# Patient Record
Sex: FEMALE | Race: BLACK OR AFRICAN AMERICAN | NOT HISPANIC OR LATINO | Employment: UNEMPLOYED | RURAL
[De-identification: names, ages, dates, MRNs, and addresses within clinical notes are randomized per-mention and may not be internally consistent; named-entity substitution may affect disease eponyms.]

---

## 2020-10-21 ENCOUNTER — HISTORICAL (OUTPATIENT)
Dept: ADMINISTRATIVE | Facility: HOSPITAL | Age: 65
End: 2020-10-21

## 2021-03-01 LAB
CHOLEST SERPL-MSCNC: 186 MG/DL (ref 0–200)
HDLC SERPL-MCNC: 65 MG/DL
LDLC SERPL CALC-MCNC: 99 MG/DL
TRIGL SERPL-MCNC: 112 MG/DL

## 2021-03-14 DIAGNOSIS — Z12.31 SCREENING MAMMOGRAM FOR HIGH-RISK PATIENT: Primary | ICD-10-CM

## 2021-06-14 RX ORDER — MELOXICAM 7.5 MG/1
7.5-15 TABLET ORAL DAILY
Qty: 30 TABLET | Refills: 1 | Status: SHIPPED | OUTPATIENT
Start: 2021-06-14 | End: 2021-08-16 | Stop reason: SDUPTHER

## 2021-06-14 RX ORDER — CITALOPRAM 20 MG/1
20 TABLET, FILM COATED ORAL DAILY
COMMUNITY
Start: 2021-04-05 | End: 2021-06-14 | Stop reason: SDUPTHER

## 2021-06-14 RX ORDER — FUROSEMIDE 40 MG/1
1 TABLET ORAL DAILY
COMMUNITY
Start: 2021-04-26 | End: 2021-06-14 | Stop reason: SDUPTHER

## 2021-06-14 RX ORDER — GLIPIZIDE 5 MG/1
5 TABLET ORAL
Qty: 30 TABLET | Refills: 1 | Status: SHIPPED | OUTPATIENT
Start: 2021-06-14 | End: 2021-08-16 | Stop reason: SDUPTHER

## 2021-06-14 RX ORDER — HYDROCHLOROTHIAZIDE 12.5 MG/1
12.5-25 TABLET ORAL DAILY
Qty: 30 TABLET | Refills: 1 | Status: SHIPPED | OUTPATIENT
Start: 2021-06-14 | End: 2021-08-16 | Stop reason: SDUPTHER

## 2021-06-14 RX ORDER — SITAGLIPTIN AND METFORMIN HYDROCHLORIDE 1000; 100 MG/1; MG/1
1 TABLET, FILM COATED, EXTENDED RELEASE ORAL DAILY
Qty: 30 TABLET | Refills: 1 | Status: SHIPPED | OUTPATIENT
Start: 2021-06-14 | End: 2021-08-16 | Stop reason: SDUPTHER

## 2021-06-14 RX ORDER — CETIRIZINE HYDROCHLORIDE 10 MG/1
1 TABLET ORAL DAILY
COMMUNITY
Start: 2021-05-31 | End: 2021-06-14 | Stop reason: SDUPTHER

## 2021-06-14 RX ORDER — HYDROCHLOROTHIAZIDE 12.5 MG/1
1-2 TABLET ORAL DAILY
COMMUNITY
Start: 2020-12-28 | End: 2021-06-14 | Stop reason: SDUPTHER

## 2021-06-14 RX ORDER — MELOXICAM 7.5 MG/1
1-2 TABLET ORAL DAILY
COMMUNITY
Start: 2021-01-09 | End: 2021-06-14 | Stop reason: SDUPTHER

## 2021-06-14 RX ORDER — GLIPIZIDE 5 MG/1
1 TABLET ORAL
COMMUNITY
Start: 2021-03-16 | End: 2021-06-14 | Stop reason: SDUPTHER

## 2021-06-14 RX ORDER — SIMVASTATIN 40 MG/1
40 TABLET, FILM COATED ORAL NIGHTLY
Qty: 30 TABLET | Refills: 1 | Status: SHIPPED | OUTPATIENT
Start: 2021-06-14 | End: 2021-08-16 | Stop reason: SDUPTHER

## 2021-06-14 RX ORDER — CITALOPRAM 20 MG/1
20 TABLET, FILM COATED ORAL DAILY
Qty: 30 TABLET | Refills: 1 | Status: SHIPPED | OUTPATIENT
Start: 2021-06-14 | End: 2021-08-16 | Stop reason: SDUPTHER

## 2021-06-14 RX ORDER — SIMVASTATIN 40 MG/1
1 TABLET, FILM COATED ORAL NIGHTLY
COMMUNITY
Start: 2021-02-12 | End: 2021-06-14 | Stop reason: SDUPTHER

## 2021-06-14 RX ORDER — LISINOPRIL 10 MG/1
10 TABLET ORAL DAILY
Qty: 30 TABLET | Refills: 1 | Status: SHIPPED | OUTPATIENT
Start: 2021-06-14 | End: 2021-06-17

## 2021-06-14 RX ORDER — SITAGLIPTIN AND METFORMIN HYDROCHLORIDE 1000; 100 MG/1; MG/1
1 TABLET, FILM COATED, EXTENDED RELEASE ORAL DAILY
COMMUNITY
Start: 2021-05-12 | End: 2021-06-14 | Stop reason: SDUPTHER

## 2021-06-14 RX ORDER — CETIRIZINE HYDROCHLORIDE 10 MG/1
10 TABLET ORAL DAILY
Qty: 30 TABLET | Refills: 1 | Status: SHIPPED | OUTPATIENT
Start: 2021-06-14 | End: 2021-08-16 | Stop reason: SDUPTHER

## 2021-06-14 RX ORDER — FUROSEMIDE 40 MG/1
40 TABLET ORAL DAILY
Qty: 30 TABLET | Refills: 1 | Status: SHIPPED | OUTPATIENT
Start: 2021-06-14 | End: 2021-08-16 | Stop reason: SDUPTHER

## 2021-06-14 RX ORDER — LISINOPRIL 10 MG/1
1 TABLET ORAL DAILY
COMMUNITY
Start: 2021-05-31 | End: 2021-06-14 | Stop reason: SDUPTHER

## 2021-06-17 ENCOUNTER — OFFICE VISIT (OUTPATIENT)
Dept: FAMILY MEDICINE | Facility: CLINIC | Age: 66
End: 2021-06-17
Payer: COMMERCIAL

## 2021-06-17 VITALS
HEIGHT: 70 IN | TEMPERATURE: 98 F | BODY MASS INDEX: 41.95 KG/M2 | HEART RATE: 88 BPM | WEIGHT: 293 LBS | OXYGEN SATURATION: 97 % | DIASTOLIC BLOOD PRESSURE: 70 MMHG | RESPIRATION RATE: 18 BRPM | SYSTOLIC BLOOD PRESSURE: 160 MMHG

## 2021-06-17 DIAGNOSIS — E11.9 TYPE 2 DIABETES MELLITUS WITHOUT COMPLICATION, WITHOUT LONG-TERM CURRENT USE OF INSULIN: ICD-10-CM

## 2021-06-17 DIAGNOSIS — I10 ESSENTIAL HYPERTENSION: ICD-10-CM

## 2021-06-17 PROBLEM — J30.1 SEASONAL ALLERGIC RHINITIS DUE TO POLLEN: Status: ACTIVE | Noted: 2021-06-17

## 2021-06-17 PROCEDURE — 99213 OFFICE O/P EST LOW 20 MIN: CPT | Mod: ,,, | Performed by: FAMILY MEDICINE

## 2021-06-17 PROCEDURE — 99213 PR OFFICE/OUTPT VISIT, EST, LEVL III, 20-29 MIN: ICD-10-PCS | Mod: ,,, | Performed by: FAMILY MEDICINE

## 2021-06-17 RX ORDER — LISINOPRIL 20 MG/1
20 TABLET ORAL DAILY
Qty: 90 TABLET | Refills: 3 | Status: SHIPPED | OUTPATIENT
Start: 2021-06-17 | End: 2021-07-27

## 2021-06-17 RX ORDER — GLIMEPIRIDE 4 MG/1
2 TABLET ORAL DAILY
COMMUNITY
Start: 2021-03-04 | End: 2021-06-17

## 2021-07-13 ENCOUNTER — OFFICE VISIT (OUTPATIENT)
Dept: FAMILY MEDICINE | Facility: CLINIC | Age: 66
End: 2021-07-13
Payer: COMMERCIAL

## 2021-07-13 VITALS
DIASTOLIC BLOOD PRESSURE: 78 MMHG | HEIGHT: 70 IN | BODY MASS INDEX: 41.95 KG/M2 | SYSTOLIC BLOOD PRESSURE: 148 MMHG | WEIGHT: 293 LBS | RESPIRATION RATE: 18 BRPM | OXYGEN SATURATION: 95 % | HEART RATE: 96 BPM

## 2021-07-13 DIAGNOSIS — I10 ESSENTIAL HYPERTENSION: Primary | ICD-10-CM

## 2021-07-13 PROCEDURE — 99213 PR OFFICE/OUTPT VISIT, EST, LEVL III, 20-29 MIN: ICD-10-PCS | Mod: ,,, | Performed by: FAMILY MEDICINE

## 2021-07-13 PROCEDURE — 99213 OFFICE O/P EST LOW 20 MIN: CPT | Mod: ,,, | Performed by: FAMILY MEDICINE

## 2021-07-27 ENCOUNTER — OFFICE VISIT (OUTPATIENT)
Dept: FAMILY MEDICINE | Facility: CLINIC | Age: 66
End: 2021-07-27
Payer: COMMERCIAL

## 2021-07-27 VITALS
WEIGHT: 293 LBS | OXYGEN SATURATION: 97 % | BODY MASS INDEX: 44.82 KG/M2 | TEMPERATURE: 98 F | HEART RATE: 82 BPM | RESPIRATION RATE: 18 BRPM | DIASTOLIC BLOOD PRESSURE: 78 MMHG | SYSTOLIC BLOOD PRESSURE: 179 MMHG

## 2021-07-27 DIAGNOSIS — I10 ESSENTIAL HYPERTENSION: Primary | ICD-10-CM

## 2021-07-27 PROCEDURE — 99212 OFFICE O/P EST SF 10 MIN: CPT | Mod: ,,, | Performed by: FAMILY MEDICINE

## 2021-07-27 PROCEDURE — 99212 PR OFFICE/OUTPT VISIT, EST, LEVL II, 10-19 MIN: ICD-10-PCS | Mod: ,,, | Performed by: FAMILY MEDICINE

## 2021-07-27 RX ORDER — LISINOPRIL 40 MG/1
40 TABLET ORAL DAILY
Qty: 90 TABLET | Refills: 1 | Status: SHIPPED | OUTPATIENT
Start: 2021-07-27 | End: 2021-08-16 | Stop reason: SDUPTHER

## 2021-08-16 DIAGNOSIS — I10 ESSENTIAL HYPERTENSION: ICD-10-CM

## 2021-08-16 RX ORDER — SITAGLIPTIN AND METFORMIN HYDROCHLORIDE 1000; 100 MG/1; MG/1
1 TABLET, FILM COATED, EXTENDED RELEASE ORAL DAILY
Qty: 30 TABLET | Refills: 1 | Status: SHIPPED | OUTPATIENT
Start: 2021-08-16 | End: 2021-08-30 | Stop reason: SDUPTHER

## 2021-08-16 RX ORDER — SIMVASTATIN 40 MG/1
40 TABLET, FILM COATED ORAL NIGHTLY
Qty: 30 TABLET | Refills: 1 | Status: SHIPPED | OUTPATIENT
Start: 2021-08-16 | End: 2021-10-13 | Stop reason: SDUPTHER

## 2021-08-16 RX ORDER — MELOXICAM 7.5 MG/1
7.5-15 TABLET ORAL DAILY
Qty: 30 TABLET | Refills: 1 | Status: SHIPPED | OUTPATIENT
Start: 2021-08-16 | End: 2021-10-13 | Stop reason: SDUPTHER

## 2021-08-16 RX ORDER — FUROSEMIDE 40 MG/1
40 TABLET ORAL DAILY
Qty: 30 TABLET | Refills: 1 | Status: SHIPPED | OUTPATIENT
Start: 2021-08-16 | End: 2021-10-13 | Stop reason: SDUPTHER

## 2021-08-16 RX ORDER — CETIRIZINE HYDROCHLORIDE 10 MG/1
10 TABLET ORAL DAILY
Qty: 30 TABLET | Refills: 1 | Status: SHIPPED | OUTPATIENT
Start: 2021-08-16 | End: 2021-10-13 | Stop reason: SDUPTHER

## 2021-08-16 RX ORDER — LISINOPRIL 40 MG/1
40 TABLET ORAL DAILY
Qty: 90 TABLET | Refills: 1 | Status: SHIPPED | OUTPATIENT
Start: 2021-08-16 | End: 2021-10-13 | Stop reason: SDUPTHER

## 2021-08-16 RX ORDER — HYDROCHLOROTHIAZIDE 12.5 MG/1
12.5-25 TABLET ORAL DAILY
Qty: 30 TABLET | Refills: 1 | Status: SHIPPED | OUTPATIENT
Start: 2021-08-16 | End: 2021-10-13 | Stop reason: SDUPTHER

## 2021-08-16 RX ORDER — GLIPIZIDE 5 MG/1
5 TABLET ORAL
Qty: 30 TABLET | Refills: 1 | Status: SHIPPED | OUTPATIENT
Start: 2021-08-16 | End: 2021-10-13

## 2021-08-16 RX ORDER — CITALOPRAM 20 MG/1
20 TABLET, FILM COATED ORAL DAILY
Qty: 30 TABLET | Refills: 1 | Status: SHIPPED | OUTPATIENT
Start: 2021-08-16 | End: 2021-10-13 | Stop reason: SDUPTHER

## 2021-08-31 RX ORDER — SITAGLIPTIN AND METFORMIN HYDROCHLORIDE 1000; 100 MG/1; MG/1
1 TABLET, FILM COATED, EXTENDED RELEASE ORAL DAILY
Qty: 30 TABLET | Refills: 1 | Status: SHIPPED | OUTPATIENT
Start: 2021-08-31 | End: 2021-10-13 | Stop reason: SDUPTHER

## 2021-09-02 ENCOUNTER — OFFICE VISIT (OUTPATIENT)
Dept: FAMILY MEDICINE | Facility: CLINIC | Age: 66
End: 2021-09-02
Payer: COMMERCIAL

## 2021-09-02 VITALS
DIASTOLIC BLOOD PRESSURE: 78 MMHG | BODY MASS INDEX: 41.95 KG/M2 | SYSTOLIC BLOOD PRESSURE: 152 MMHG | HEART RATE: 90 BPM | WEIGHT: 293 LBS | RESPIRATION RATE: 20 BRPM | TEMPERATURE: 99 F | OXYGEN SATURATION: 98 % | HEIGHT: 70 IN

## 2021-09-02 DIAGNOSIS — Z12.11 ENCOUNTER FOR SCREENING FOR MALIGNANT NEOPLASM OF COLON: ICD-10-CM

## 2021-09-02 DIAGNOSIS — E11.9 TYPE 2 DIABETES MELLITUS WITHOUT COMPLICATION, WITHOUT LONG-TERM CURRENT USE OF INSULIN: ICD-10-CM

## 2021-09-02 DIAGNOSIS — Z00.00 ENCOUNTER FOR ANNUAL WELLNESS VISIT (AWV) IN MEDICARE PATIENT: Primary | ICD-10-CM

## 2021-09-02 DIAGNOSIS — Z11.59 ENCOUNTER FOR HEPATITIS C SCREENING TEST FOR LOW RISK PATIENT: ICD-10-CM

## 2021-09-02 DIAGNOSIS — Z90.49 HX OF COLECTOMY: ICD-10-CM

## 2021-09-02 PROCEDURE — 3048F LDL-C <100 MG/DL: CPT | Mod: ,,, | Performed by: FAMILY MEDICINE

## 2021-09-02 PROCEDURE — 1036F TOBACCO NON-USER: CPT | Mod: ,,, | Performed by: FAMILY MEDICINE

## 2021-09-02 PROCEDURE — G0402 PR WELCOME MEDICARE PREVENTIVE VISIT NEW ENROLLEE: ICD-10-PCS | Mod: ,,, | Performed by: FAMILY MEDICINE

## 2021-09-02 PROCEDURE — 1036F PR CURRENT TOBACCO NON-USER: ICD-10-PCS | Mod: ,,, | Performed by: FAMILY MEDICINE

## 2021-09-02 PROCEDURE — G0402 INITIAL PREVENTIVE EXAM: HCPCS | Mod: ,,, | Performed by: FAMILY MEDICINE

## 2021-09-02 PROCEDURE — 3048F PR MOST RECENT LDL-C < 100 MG/DL: ICD-10-PCS | Mod: ,,, | Performed by: FAMILY MEDICINE

## 2021-09-02 RX ORDER — ASPIRIN 325 MG
325 TABLET ORAL DAILY
COMMUNITY
End: 2021-10-13 | Stop reason: SDUPTHER

## 2021-09-02 RX ORDER — MULTIVITAMIN
1 TABLET ORAL DAILY
COMMUNITY

## 2021-09-02 RX ORDER — BROMPHENIRAMINE MALEATE, DEXTROMETHORPHAN HBR, PHENYLEPHRINE HCL, DIPHENHYDRAMINE HCL, PHENYLEPHRINE HCL 0.52G
0.52 KIT ORAL 2 TIMES DAILY
COMMUNITY
End: 2022-09-27 | Stop reason: SDUPTHER

## 2021-10-13 ENCOUNTER — OFFICE VISIT (OUTPATIENT)
Dept: FAMILY MEDICINE | Facility: CLINIC | Age: 66
End: 2021-10-13
Payer: COMMERCIAL

## 2021-10-13 VITALS
HEIGHT: 70 IN | HEART RATE: 96 BPM | BODY MASS INDEX: 41.95 KG/M2 | RESPIRATION RATE: 16 BRPM | DIASTOLIC BLOOD PRESSURE: 70 MMHG | WEIGHT: 293 LBS | OXYGEN SATURATION: 96 % | SYSTOLIC BLOOD PRESSURE: 148 MMHG | TEMPERATURE: 98 F

## 2021-10-13 DIAGNOSIS — Z23 NEED FOR IMMUNIZATION AGAINST INFLUENZA: ICD-10-CM

## 2021-10-13 DIAGNOSIS — F33.42 RECURRENT MAJOR DEPRESSIVE DISORDER, IN FULL REMISSION: ICD-10-CM

## 2021-10-13 DIAGNOSIS — J30.1 SEASONAL ALLERGIC RHINITIS DUE TO POLLEN: Primary | ICD-10-CM

## 2021-10-13 DIAGNOSIS — I10 ESSENTIAL HYPERTENSION: ICD-10-CM

## 2021-10-13 DIAGNOSIS — E78.5 HYPERLIPIDEMIA, UNSPECIFIED HYPERLIPIDEMIA TYPE: ICD-10-CM

## 2021-10-13 DIAGNOSIS — E11.9 TYPE 2 DIABETES MELLITUS WITHOUT COMPLICATION, WITHOUT LONG-TERM CURRENT USE OF INSULIN: ICD-10-CM

## 2021-10-13 PROCEDURE — G0008 ADMIN INFLUENZA VIRUS VAC: HCPCS | Mod: ,,, | Performed by: FAMILY MEDICINE

## 2021-10-13 PROCEDURE — 90686 FLU VACCINE (QUAD) GREATER THAN OR EQUAL TO 3YO PRESERVATIVE FREE IM: ICD-10-PCS | Mod: ,,, | Performed by: FAMILY MEDICINE

## 2021-10-13 PROCEDURE — 90686 IIV4 VACC NO PRSV 0.5 ML IM: CPT | Mod: ,,, | Performed by: FAMILY MEDICINE

## 2021-10-13 PROCEDURE — 99214 PR OFFICE/OUTPT VISIT, EST, LEVL IV, 30-39 MIN: ICD-10-PCS | Mod: ,,, | Performed by: FAMILY MEDICINE

## 2021-10-13 PROCEDURE — G0008 FLU VACCINE (QUAD) GREATER THAN OR EQUAL TO 3YO PRESERVATIVE FREE IM: ICD-10-PCS | Mod: ,,, | Performed by: FAMILY MEDICINE

## 2021-10-13 PROCEDURE — 99214 OFFICE O/P EST MOD 30 MIN: CPT | Mod: ,,, | Performed by: FAMILY MEDICINE

## 2021-10-13 RX ORDER — OLOPATADINE HYDROCHLORIDE 2 MG/ML
SOLUTION/ DROPS OPHTHALMIC
COMMUNITY
Start: 2021-09-30

## 2021-10-13 RX ORDER — SIMVASTATIN 40 MG/1
40 TABLET, FILM COATED ORAL NIGHTLY
Qty: 30 TABLET | Refills: 1 | Status: SHIPPED | OUTPATIENT
Start: 2021-10-13 | End: 2021-12-22 | Stop reason: SDUPTHER

## 2021-10-13 RX ORDER — ASPIRIN 325 MG
325 TABLET ORAL DAILY
Qty: 90 TABLET | Refills: 2 | Status: SHIPPED | OUTPATIENT
Start: 2021-10-13 | End: 2022-04-13 | Stop reason: SDUPTHER

## 2021-10-13 RX ORDER — ASCORBIC ACID/MULTIVIT-MIN 1000 MG
1 EFFERVESCENT POWDER IN PACKET ORAL DAILY
COMMUNITY

## 2021-10-13 RX ORDER — HYDROCHLOROTHIAZIDE 12.5 MG/1
12.5-25 TABLET ORAL DAILY
Qty: 30 TABLET | Refills: 1 | Status: SHIPPED | OUTPATIENT
Start: 2021-10-13 | End: 2022-04-13 | Stop reason: SDUPTHER

## 2021-10-13 RX ORDER — SITAGLIPTIN AND METFORMIN HYDROCHLORIDE 1000; 100 MG/1; MG/1
1 TABLET, FILM COATED, EXTENDED RELEASE ORAL DAILY
Qty: 30 TABLET | Refills: 1 | Status: SHIPPED | OUTPATIENT
Start: 2021-10-13 | End: 2021-12-22 | Stop reason: SDUPTHER

## 2021-10-13 RX ORDER — LISINOPRIL 40 MG/1
40 TABLET ORAL DAILY
Qty: 90 TABLET | Refills: 1 | Status: SHIPPED | OUTPATIENT
Start: 2021-10-13 | End: 2022-04-13 | Stop reason: SDUPTHER

## 2021-10-13 RX ORDER — CITALOPRAM 20 MG/1
20 TABLET, FILM COATED ORAL DAILY
Qty: 30 TABLET | Refills: 1 | Status: SHIPPED | OUTPATIENT
Start: 2021-10-13 | End: 2022-04-13 | Stop reason: SDUPTHER

## 2021-10-13 RX ORDER — FUROSEMIDE 40 MG/1
40 TABLET ORAL DAILY
Qty: 30 TABLET | Refills: 1 | Status: SHIPPED | OUTPATIENT
Start: 2021-10-13 | End: 2021-12-22 | Stop reason: SDUPTHER

## 2021-10-13 RX ORDER — MELOXICAM 7.5 MG/1
7.5-15 TABLET ORAL DAILY
Qty: 30 TABLET | Refills: 1 | Status: SHIPPED | OUTPATIENT
Start: 2021-10-13 | End: 2021-12-22 | Stop reason: SDUPTHER

## 2021-10-13 RX ORDER — CETIRIZINE HYDROCHLORIDE 10 MG/1
10 TABLET ORAL DAILY
Qty: 30 TABLET | Refills: 1 | Status: SHIPPED | OUTPATIENT
Start: 2021-10-13 | End: 2021-12-22 | Stop reason: SDUPTHER

## 2021-10-27 ENCOUNTER — HOSPITAL ENCOUNTER (OUTPATIENT)
Dept: RADIOLOGY | Facility: HOSPITAL | Age: 66
Discharge: HOME | End: 2021-10-27
Payer: COMMERCIAL

## 2021-10-27 VITALS — BODY MASS INDEX: 41.95 KG/M2 | HEIGHT: 70 IN | WEIGHT: 293 LBS

## 2021-10-27 DIAGNOSIS — Z12.31 SCREENING MAMMOGRAM FOR HIGH-RISK PATIENT: ICD-10-CM

## 2021-10-27 PROCEDURE — 77067 SCR MAMMO BI INCL CAD: CPT | Mod: TC

## 2021-10-28 DIAGNOSIS — Z86.010 HISTORY OF COLON POLYPS: Primary | ICD-10-CM

## 2021-12-22 DIAGNOSIS — I10 ESSENTIAL HYPERTENSION: ICD-10-CM

## 2021-12-22 DIAGNOSIS — E78.5 HYPERLIPIDEMIA, UNSPECIFIED HYPERLIPIDEMIA TYPE: ICD-10-CM

## 2021-12-22 DIAGNOSIS — J30.1 SEASONAL ALLERGIC RHINITIS DUE TO POLLEN: ICD-10-CM

## 2021-12-22 DIAGNOSIS — E11.9 TYPE 2 DIABETES MELLITUS WITHOUT COMPLICATION, WITHOUT LONG-TERM CURRENT USE OF INSULIN: ICD-10-CM

## 2021-12-22 RX ORDER — SITAGLIPTIN AND METFORMIN HYDROCHLORIDE 1000; 100 MG/1; MG/1
1 TABLET, FILM COATED, EXTENDED RELEASE ORAL DAILY
Qty: 30 TABLET | Refills: 1 | Status: SHIPPED | OUTPATIENT
Start: 2021-12-22 | End: 2022-02-23

## 2021-12-22 RX ORDER — SIMVASTATIN 40 MG/1
40 TABLET, FILM COATED ORAL NIGHTLY
Qty: 30 TABLET | Refills: 1 | Status: SHIPPED | OUTPATIENT
Start: 2021-12-22 | End: 2022-04-13 | Stop reason: SDUPTHER

## 2021-12-22 RX ORDER — CETIRIZINE HYDROCHLORIDE 10 MG/1
10 TABLET ORAL DAILY
Qty: 30 TABLET | Refills: 1 | Status: SHIPPED | OUTPATIENT
Start: 2021-12-22 | End: 2022-02-23

## 2021-12-22 RX ORDER — FUROSEMIDE 40 MG/1
40 TABLET ORAL DAILY
Qty: 30 TABLET | Refills: 1 | Status: SHIPPED | OUTPATIENT
Start: 2021-12-22 | End: 2022-04-13 | Stop reason: SDUPTHER

## 2021-12-23 RX ORDER — MELOXICAM 7.5 MG/1
7.5-15 TABLET ORAL DAILY
Qty: 30 TABLET | Refills: 1 | Status: SHIPPED | OUTPATIENT
Start: 2021-12-23 | End: 2022-01-31

## 2022-03-22 DIAGNOSIS — E11.9 TYPE 2 DIABETES MELLITUS WITHOUT COMPLICATION, WITHOUT LONG-TERM CURRENT USE OF INSULIN: ICD-10-CM

## 2022-03-22 RX ORDER — SITAGLIPTIN AND METFORMIN HYDROCHLORIDE 1000; 100 MG/1; MG/1
1 TABLET, FILM COATED, EXTENDED RELEASE ORAL DAILY
Qty: 30 TABLET | Refills: 1 | Status: CANCELLED | OUTPATIENT
Start: 2022-03-22

## 2022-04-13 ENCOUNTER — OFFICE VISIT (OUTPATIENT)
Dept: FAMILY MEDICINE | Facility: CLINIC | Age: 67
End: 2022-04-13
Payer: COMMERCIAL

## 2022-04-13 VITALS
TEMPERATURE: 98 F | HEIGHT: 70 IN | SYSTOLIC BLOOD PRESSURE: 140 MMHG | HEART RATE: 88 BPM | DIASTOLIC BLOOD PRESSURE: 78 MMHG | WEIGHT: 293 LBS | OXYGEN SATURATION: 96 % | RESPIRATION RATE: 20 BRPM | BODY MASS INDEX: 41.95 KG/M2

## 2022-04-13 DIAGNOSIS — E78.5 HYPERLIPIDEMIA, UNSPECIFIED HYPERLIPIDEMIA TYPE: ICD-10-CM

## 2022-04-13 DIAGNOSIS — I10 ESSENTIAL HYPERTENSION: Primary | ICD-10-CM

## 2022-04-13 DIAGNOSIS — Z78.0 POSTMENOPAUSAL: ICD-10-CM

## 2022-04-13 DIAGNOSIS — J30.1 SEASONAL ALLERGIC RHINITIS DUE TO POLLEN: ICD-10-CM

## 2022-04-13 DIAGNOSIS — F33.42 RECURRENT MAJOR DEPRESSIVE DISORDER, IN FULL REMISSION: ICD-10-CM

## 2022-04-13 DIAGNOSIS — Z20.822 CLOSE EXPOSURE TO COVID-19 VIRUS: ICD-10-CM

## 2022-04-13 DIAGNOSIS — E11.9 TYPE 2 DIABETES MELLITUS WITHOUT COMPLICATION, WITHOUT LONG-TERM CURRENT USE OF INSULIN: ICD-10-CM

## 2022-04-13 LAB
ALBUMIN SERPL BCP-MCNC: 3.6 G/DL (ref 3.5–5)
ALBUMIN/GLOB SERPL: 0.7 {RATIO}
ALP SERPL-CCNC: 73 U/L (ref 55–142)
ALT SERPL W P-5'-P-CCNC: 20 U/L (ref 13–56)
ANION GAP SERPL CALCULATED.3IONS-SCNC: 6 MMOL/L (ref 7–16)
AST SERPL W P-5'-P-CCNC: 11 U/L (ref 15–37)
BILIRUB SERPL-MCNC: 0.2 MG/DL (ref 0–1.2)
BUN SERPL-MCNC: 19 MG/DL (ref 7–18)
BUN/CREAT SERPL: 19 (ref 6–20)
CALCIUM SERPL-MCNC: 10.3 MG/DL (ref 8.5–10.1)
CHLORIDE SERPL-SCNC: 101 MMOL/L (ref 98–107)
CHOLEST SERPL-MCNC: 155 MG/DL (ref 0–200)
CHOLEST/HDLC SERPL: 2.3 {RATIO}
CO2 SERPL-SCNC: 32 MMOL/L (ref 21–32)
CREAT SERPL-MCNC: 1.01 MG/DL (ref 0.55–1.02)
CREAT UR-MCNC: 45 MG/DL (ref 28–219)
CTP QC/QA: YES
EST. AVERAGE GLUCOSE BLD GHB EST-MCNC: 154 MG/DL
GLOBULIN SER-MCNC: 5.2 G/DL (ref 2–4)
GLUCOSE SERPL-MCNC: 168 MG/DL (ref 74–106)
HBA1C MFR BLD HPLC: 7.2 % (ref 4.5–6.6)
HDLC SERPL-MCNC: 66 MG/DL (ref 40–60)
LDLC SERPL CALC-MCNC: 57 MG/DL
LDLC/HDLC SERPL: 0.9 {RATIO}
MICROALBUMIN UR-MCNC: 0.5 MG/DL (ref 0–2.8)
MICROALBUMIN/CREAT RATIO PNL UR: 11.1 MG/G (ref 0–30)
NONHDLC SERPL-MCNC: 89 MG/DL
POTASSIUM SERPL-SCNC: 4.3 MMOL/L (ref 3.5–5.1)
PROT SERPL-MCNC: 8.8 G/DL (ref 6.4–8.2)
SARS-COV-2 AG RESP QL IA.RAPID: NEGATIVE
SODIUM SERPL-SCNC: 135 MMOL/L (ref 136–145)
TRIGL SERPL-MCNC: 161 MG/DL (ref 35–150)
VLDLC SERPL-MCNC: 32 MG/DL

## 2022-04-13 PROCEDURE — 82043 UR ALBUMIN QUANTITATIVE: CPT | Mod: ,,, | Performed by: CLINICAL MEDICAL LABORATORY

## 2022-04-13 PROCEDURE — 3051F PR MOST RECENT HEMOGLOBIN A1C LEVEL 7.0 - < 8.0%: ICD-10-PCS | Mod: ,,, | Performed by: FAMILY MEDICINE

## 2022-04-13 PROCEDURE — 87426 SARSCOV CORONAVIRUS AG IA: CPT | Mod: QW,,, | Performed by: FAMILY MEDICINE

## 2022-04-13 PROCEDURE — 80061 LIPID PANEL: ICD-10-PCS | Mod: ,,, | Performed by: CLINICAL MEDICAL LABORATORY

## 2022-04-13 PROCEDURE — 99214 OFFICE O/P EST MOD 30 MIN: CPT | Mod: ,,, | Performed by: FAMILY MEDICINE

## 2022-04-13 PROCEDURE — 82043 MICROALBUMIN / CREATININE RATIO URINE: ICD-10-PCS | Mod: ,,, | Performed by: CLINICAL MEDICAL LABORATORY

## 2022-04-13 PROCEDURE — 99214 PR OFFICE/OUTPT VISIT, EST, LEVL IV, 30-39 MIN: ICD-10-PCS | Mod: ,,, | Performed by: FAMILY MEDICINE

## 2022-04-13 PROCEDURE — 3008F PR BODY MASS INDEX (BMI) DOCUMENTED: ICD-10-PCS | Mod: ,,, | Performed by: FAMILY MEDICINE

## 2022-04-13 PROCEDURE — 3051F HG A1C>EQUAL 7.0%<8.0%: CPT | Mod: ,,, | Performed by: FAMILY MEDICINE

## 2022-04-13 PROCEDURE — 4010F PR ACE/ARB THEARPY RXD/TAKEN: ICD-10-PCS | Mod: ,,, | Performed by: FAMILY MEDICINE

## 2022-04-13 PROCEDURE — 3078F PR MOST RECENT DIASTOLIC BLOOD PRESSURE < 80 MM HG: ICD-10-PCS | Mod: ,,, | Performed by: FAMILY MEDICINE

## 2022-04-13 PROCEDURE — 80061 LIPID PANEL: CPT | Mod: ,,, | Performed by: CLINICAL MEDICAL LABORATORY

## 2022-04-13 PROCEDURE — 82570 MICROALBUMIN / CREATININE RATIO URINE: ICD-10-PCS | Mod: ,,, | Performed by: CLINICAL MEDICAL LABORATORY

## 2022-04-13 PROCEDURE — 3077F PR MOST RECENT SYSTOLIC BLOOD PRESSURE >= 140 MM HG: ICD-10-PCS | Mod: ,,, | Performed by: FAMILY MEDICINE

## 2022-04-13 PROCEDURE — 3288F PR FALLS RISK ASSESSMENT DOCUMENTED: ICD-10-PCS | Mod: ,,, | Performed by: FAMILY MEDICINE

## 2022-04-13 PROCEDURE — 4010F ACE/ARB THERAPY RXD/TAKEN: CPT | Mod: ,,, | Performed by: FAMILY MEDICINE

## 2022-04-13 PROCEDURE — 3288F FALL RISK ASSESSMENT DOCD: CPT | Mod: ,,, | Performed by: FAMILY MEDICINE

## 2022-04-13 PROCEDURE — 1159F PR MEDICATION LIST DOCUMENTED IN MEDICAL RECORD: ICD-10-PCS | Mod: ,,, | Performed by: FAMILY MEDICINE

## 2022-04-13 PROCEDURE — 83036 HEMOGLOBIN GLYCOSYLATED A1C: CPT | Mod: ,,, | Performed by: CLINICAL MEDICAL LABORATORY

## 2022-04-13 PROCEDURE — 83036 HEMOGLOBIN A1C: ICD-10-PCS | Mod: ,,, | Performed by: CLINICAL MEDICAL LABORATORY

## 2022-04-13 PROCEDURE — 3008F BODY MASS INDEX DOCD: CPT | Mod: ,,, | Performed by: FAMILY MEDICINE

## 2022-04-13 PROCEDURE — 82570 ASSAY OF URINE CREATININE: CPT | Mod: ,,, | Performed by: CLINICAL MEDICAL LABORATORY

## 2022-04-13 PROCEDURE — 80053 COMPREHEN METABOLIC PANEL: CPT | Mod: ,,, | Performed by: CLINICAL MEDICAL LABORATORY

## 2022-04-13 PROCEDURE — 1159F MED LIST DOCD IN RCRD: CPT | Mod: ,,, | Performed by: FAMILY MEDICINE

## 2022-04-13 PROCEDURE — 1160F PR REVIEW ALL MEDS BY PRESCRIBER/CLIN PHARMACIST DOCUMENTED: ICD-10-PCS | Mod: ,,, | Performed by: FAMILY MEDICINE

## 2022-04-13 PROCEDURE — 80053 COMPREHENSIVE METABOLIC PANEL: ICD-10-PCS | Mod: ,,, | Performed by: CLINICAL MEDICAL LABORATORY

## 2022-04-13 PROCEDURE — 3078F DIAST BP <80 MM HG: CPT | Mod: ,,, | Performed by: FAMILY MEDICINE

## 2022-04-13 PROCEDURE — 1160F RVW MEDS BY RX/DR IN RCRD: CPT | Mod: ,,, | Performed by: FAMILY MEDICINE

## 2022-04-13 PROCEDURE — 1101F PT FALLS ASSESS-DOCD LE1/YR: CPT | Mod: ,,, | Performed by: FAMILY MEDICINE

## 2022-04-13 PROCEDURE — 3077F SYST BP >= 140 MM HG: CPT | Mod: ,,, | Performed by: FAMILY MEDICINE

## 2022-04-13 PROCEDURE — 1101F PR PT FALLS ASSESS DOC 0-1 FALLS W/OUT INJ PAST YR: ICD-10-PCS | Mod: ,,, | Performed by: FAMILY MEDICINE

## 2022-04-13 PROCEDURE — 87426 SARS CORONAVIRUS 2 ANTIGEN POCT: ICD-10-PCS | Mod: QW,,, | Performed by: FAMILY MEDICINE

## 2022-04-13 RX ORDER — SITAGLIPTIN AND METFORMIN HYDROCHLORIDE 1000; 100 MG/1; MG/1
1 TABLET, FILM COATED, EXTENDED RELEASE ORAL DAILY
Qty: 90 TABLET | Refills: 1 | Status: SHIPPED | OUTPATIENT
Start: 2022-04-13 | End: 2022-05-17 | Stop reason: SDUPTHER

## 2022-04-13 RX ORDER — HYDROCHLOROTHIAZIDE 12.5 MG/1
12.5-25 TABLET ORAL DAILY
Qty: 30 TABLET | Refills: 1 | Status: SHIPPED | OUTPATIENT
Start: 2022-04-13 | End: 2022-05-17 | Stop reason: SDUPTHER

## 2022-04-13 RX ORDER — FUROSEMIDE 40 MG/1
40 TABLET ORAL DAILY
Qty: 30 TABLET | Refills: 1 | Status: SHIPPED | OUTPATIENT
Start: 2022-04-13 | End: 2022-05-17 | Stop reason: SDUPTHER

## 2022-04-13 RX ORDER — LISINOPRIL 40 MG/1
40 TABLET ORAL DAILY
Qty: 90 TABLET | Refills: 1 | Status: SHIPPED | OUTPATIENT
Start: 2022-04-13 | End: 2022-05-17 | Stop reason: SDUPTHER

## 2022-04-13 RX ORDER — ASPIRIN 325 MG
325 TABLET ORAL DAILY
Qty: 90 TABLET | Refills: 2 | Status: SHIPPED | OUTPATIENT
Start: 2022-04-13

## 2022-04-13 RX ORDER — MELOXICAM 7.5 MG/1
TABLET ORAL
Qty: 90 TABLET | Refills: 1 | Status: SHIPPED | OUTPATIENT
Start: 2022-04-13 | End: 2022-06-15

## 2022-04-13 RX ORDER — SIMVASTATIN 40 MG/1
40 TABLET, FILM COATED ORAL NIGHTLY
Qty: 30 TABLET | Refills: 1 | Status: SHIPPED | OUTPATIENT
Start: 2022-04-13 | End: 2022-07-13

## 2022-04-13 RX ORDER — CITALOPRAM 20 MG/1
20 TABLET, FILM COATED ORAL DAILY
Qty: 30 TABLET | Refills: 1 | Status: SHIPPED | OUTPATIENT
Start: 2022-04-13 | End: 2022-09-09 | Stop reason: SDUPTHER

## 2022-04-13 RX ORDER — CETIRIZINE HYDROCHLORIDE 10 MG/1
10 TABLET ORAL DAILY
Qty: 90 TABLET | Refills: 1 | Status: SHIPPED | OUTPATIENT
Start: 2022-04-13 | End: 2022-09-09 | Stop reason: SDUPTHER

## 2022-04-13 NOTE — PROGRESS NOTES
Josselin Denise MD        PATIENT NAME: Saray Jorgensen  : 1955  DATE: 22  MRN: [id number]      Billing Provider: Josselin Denise MD  Level of Service: UT OFFICE/OUTPT VISIT, EST, LEVL IV, 30-39 MIN  Patient PCP Information     Provider PCP Type    Josselin Denise MD General          Reason for Visit / Chief Complaint: Annual Exam       History of Present Illness      Saray Jorgensen presents to the clinic with Annual Exam       dental visits discussed    sun screen use discussed, use of helmets and seat belts discussed  Gun safety discussed  Sleep discussed  Diet and exercise discussed          Review of Systems     Review of Systems    Medical / Social / Family History     Past Medical History:   Diagnosis Date    Colon cancer     Diabetes     Diabetes mellitus, type 2     History of colonoscopy 2014    Hyperlipidemia     Hypertension        Past Surgical History:   Procedure Laterality Date    COLECTOMY, RIGHT         Social History  Ms.  reports that she has never smoked. She has never used smokeless tobacco. She reports that she does not drink alcohol and does not use drugs.    Family History  Ms.'s family history includes Diabetes in her brother, mother, and sister; Hypertension in her brother, mother, and sister; Prostate cancer in her father.    Medications and Allergies     Medications  Outpatient Medications Marked as Taking for the 22 encounter (Office Visit) with Josselin Denise MD   Medication Sig Dispense Refill    ascorbic acid-multivit-min (VITAMIN C ENERGY BOOSTER) 1,000 mg PwEP Take 1 packet by mouth once daily.      ascorbic acid-multivit-min (VITAMIN C FIZZY DRINK) 1,000 mg PwEP Take 1 packet by mouth once daily.      calcium carbonate/vitamin D3 (CALCIUM 600 WITH VITAMIN D3 ORAL) Take 1 tablet by mouth once daily.      GARLIC ORAL Take 1 tablet by mouth once daily.      multivitamin (THERAGRAN) per tablet Take 1 tablet by mouth once daily.      olopatadine  "(PATADAY) 0.2 % Drop       psyllium 0.52 gram capsule Take 0.52 g by mouth 2 (two) times a day.      [DISCONTINUED] aspirin 325 MG tablet Take 1 tablet (325 mg total) by mouth once daily. 90 tablet 2    [DISCONTINUED] cetirizine (ZYRTEC) 10 MG tablet TAKE ONE TABLET BY MOUTH ONCE DAILY 30 tablet 1    [DISCONTINUED] citalopram (CELEXA) 20 MG tablet Take 1 tablet (20 mg total) by mouth once daily. 30 tablet 1    [DISCONTINUED] furosemide (LASIX) 40 MG tablet Take 1 tablet (40 mg total) by mouth once daily. 30 tablet 1    [DISCONTINUED] hydroCHLOROthiazide (HYDRODIURIL) 12.5 MG Tab Take 1-2 tablets (12.5-25 mg total) by mouth once daily. 30 tablet 1    [DISCONTINUED] JANUMET -1,000 mg TM24 TAKE ONE TABLET BY MOUTH ONCE DAILY 30 tablet 1    [DISCONTINUED] lisinopriL (PRINIVIL,ZESTRIL) 40 MG tablet Take 1 tablet (40 mg total) by mouth once daily. 90 tablet 1    [DISCONTINUED] meloxicam (MOBIC) 7.5 MG tablet TAKE 1 TO 2 TABLETS BY MOUTH EVERY DAY 60 tablet 1    [DISCONTINUED] simvastatin (ZOCOR) 40 MG tablet Take 1 tablet (40 mg total) by mouth nightly. 30 tablet 1       Allergies  Review of patient's allergies indicates:  No Known Allergies    Physical Examination   BP (!) 140/78   Pulse 88   Temp 98.4 °F (36.9 °C) (Temporal)   Resp 20   Ht 5' 10" (1.778 m)   Wt (!) 137.4 kg (303 lb)   SpO2 96%   BMI 43.48 kg/m²     Physical Exam  Constitutional:       Appearance: Normal appearance. She is obese.   Cardiovascular:      Rate and Rhythm: Normal rate and regular rhythm.      Pulses:           Dorsalis pedis pulses are 3+ on the right side and 3+ on the left side.        Posterior tibial pulses are 3+ on the right side and 3+ on the left side.      Heart sounds: Normal heart sounds.   Musculoskeletal:         General: Normal range of motion.      Right foot: Normal range of motion. No deformity, bunion, Charcot foot or foot drop.      Left foot: Normal range of motion. No deformity, bunion, Charcot " foot or foot drop.   Feet:      Right foot:      Protective Sensation: 5 sites tested. 5 sites sensed.      Skin integrity: No ulcer or blister.      Toenail Condition: Right toenails are normal.      Left foot:      Protective Sensation: 5 sites tested. 5 sites sensed.      Skin integrity: No ulcer or blister.      Toenail Condition: Left toenails are normal.   Skin:     General: Skin is warm.   Neurological:      General: No focal deficit present.      Mental Status: She is alert.   Psychiatric:         Mood and Affect: Mood normal.         Behavior: Behavior normal.         Judgment: Judgment normal.         No results found for this or any previous visit (from the past 24 hour(s)).     Assessment and Plan (including Health Maintenance)     Plan:         Problem List Items Addressed This Visit        ENT    Seasonal allergic rhinitis due to pollen    Relevant Medications    cetirizine (ZYRTEC) 10 MG tablet    Other Relevant Orders    SARS Coronavirus 2 Antigen, POCT (Completed)       Cardiac/Vascular    Essential hypertension - Primary    Relevant Medications    furosemide (LASIX) 40 MG tablet    lisinopriL (PRINIVIL,ZESTRIL) 40 MG tablet    aspirin 325 MG tablet    hydroCHLOROthiazide (HYDRODIURIL) 12.5 MG Tab       Endocrine    Type 2 diabetes mellitus without complication, without long-term current use of insulin    Relevant Medications    JANUMET -1,000 mg TM24    Other Relevant Orders    Comprehensive Metabolic Panel (Completed)    Lipid Panel (Completed)    Microalbumin/Creatinine Ratio, Urine (Completed)    Hemoglobin A1C (Completed)      Other Visit Diagnoses     Hyperlipidemia, unspecified hyperlipidemia type        Relevant Medications    simvastatin (ZOCOR) 40 MG tablet    Recurrent major depressive disorder, in full remission        Relevant Medications    citalopram (CELEXA) 20 MG tablet    Close exposure to COVID-19 virus        Relevant Orders    SARS Coronavirus 2 Antigen, POCT (Completed)     Postmenopausal        Relevant Orders    DXA Bone Density Spine And Hip (Completed)        - work on diet, specifically cutting back bread, breaded things, cereal, pasta, potatoes, rice  - try to exercise at least 150min a week divided however you want  - if you can do weight, even very light weight do them  - keep an eye on sugars, fasting sugar goal , after eating no greater than 180  - A1C goal is around 7.0%  - continue current regiment and please note if any changes were made      Follow up in about 6 months (around 10/13/2022).        Signature:  Josselin Denise MD      Date of encounter: 4/13/22

## 2022-04-21 ENCOUNTER — HOSPITAL ENCOUNTER (OUTPATIENT)
Dept: RADIOLOGY | Facility: HOSPITAL | Age: 67
Discharge: HOME | End: 2022-04-21
Attending: FAMILY MEDICINE
Payer: COMMERCIAL

## 2022-04-21 DIAGNOSIS — Z78.0 POSTMENOPAUSAL: ICD-10-CM

## 2022-04-21 PROCEDURE — 77080 DXA BONE DENSITY AXIAL: CPT | Mod: TC

## 2022-04-21 PROCEDURE — 77080 DEXA BONE DENSITY SPINE HIP: ICD-10-PCS | Mod: 26,,, | Performed by: RADIOLOGY

## 2022-04-21 PROCEDURE — 77080 DXA BONE DENSITY AXIAL: CPT | Mod: 26,,, | Performed by: RADIOLOGY

## 2022-05-17 ENCOUNTER — OFFICE VISIT (OUTPATIENT)
Dept: FAMILY MEDICINE | Facility: CLINIC | Age: 67
End: 2022-05-17
Payer: COMMERCIAL

## 2022-05-17 VITALS
WEIGHT: 293 LBS | OXYGEN SATURATION: 97 % | TEMPERATURE: 98 F | SYSTOLIC BLOOD PRESSURE: 149 MMHG | HEART RATE: 87 BPM | HEIGHT: 70 IN | RESPIRATION RATE: 18 BRPM | DIASTOLIC BLOOD PRESSURE: 87 MMHG | BODY MASS INDEX: 41.95 KG/M2

## 2022-05-17 DIAGNOSIS — R10.9 ABDOMINAL PAIN, UNSPECIFIED ABDOMINAL LOCATION: ICD-10-CM

## 2022-05-17 DIAGNOSIS — R10.31 RIGHT LOWER QUADRANT ABDOMINAL PAIN: ICD-10-CM

## 2022-05-17 DIAGNOSIS — R10.9 RIGHT FLANK PAIN: Primary | ICD-10-CM

## 2022-05-17 DIAGNOSIS — E11.9 TYPE 2 DIABETES MELLITUS WITHOUT COMPLICATION, WITHOUT LONG-TERM CURRENT USE OF INSULIN: ICD-10-CM

## 2022-05-17 DIAGNOSIS — I10 ESSENTIAL HYPERTENSION: ICD-10-CM

## 2022-05-17 LAB
BILIRUB SERPL-MCNC: NEGATIVE MG/DL
BLOOD URINE, POC: NEGATIVE
COLOR, POC UA: NORMAL
GLUCOSE UR QL STRIP: NEGATIVE
KETONES UR QL STRIP: NEGATIVE
LEUKOCYTE ESTERASE URINE, POC: NEGATIVE
NITRITE, POC UA: NEGATIVE
PH, POC UA: 5
PROTEIN, POC: NEGATIVE
SPECIFIC GRAVITY, POC UA: 1.01
UROBILINOGEN, POC UA: 0.2

## 2022-05-17 PROCEDURE — 3061F PR NEG MICROALBUMINURIA RESULT DOCUMENTED/REVIEW: ICD-10-PCS | Mod: ,,, | Performed by: FAMILY MEDICINE

## 2022-05-17 PROCEDURE — 81003 URINALYSIS AUTO W/O SCOPE: CPT | Mod: QW,,, | Performed by: FAMILY MEDICINE

## 2022-05-17 PROCEDURE — 3051F HG A1C>EQUAL 7.0%<8.0%: CPT | Mod: ,,, | Performed by: FAMILY MEDICINE

## 2022-05-17 PROCEDURE — 3066F PR DOCUMENTATION OF TREATMENT FOR NEPHROPATHY: ICD-10-PCS | Mod: ,,, | Performed by: FAMILY MEDICINE

## 2022-05-17 PROCEDURE — 3079F DIAST BP 80-89 MM HG: CPT | Mod: ,,, | Performed by: FAMILY MEDICINE

## 2022-05-17 PROCEDURE — 1160F PR REVIEW ALL MEDS BY PRESCRIBER/CLIN PHARMACIST DOCUMENTED: ICD-10-PCS | Mod: ,,, | Performed by: FAMILY MEDICINE

## 2022-05-17 PROCEDURE — 3008F PR BODY MASS INDEX (BMI) DOCUMENTED: ICD-10-PCS | Mod: ,,, | Performed by: FAMILY MEDICINE

## 2022-05-17 PROCEDURE — 1159F PR MEDICATION LIST DOCUMENTED IN MEDICAL RECORD: ICD-10-PCS | Mod: ,,, | Performed by: FAMILY MEDICINE

## 2022-05-17 PROCEDURE — 3008F BODY MASS INDEX DOCD: CPT | Mod: ,,, | Performed by: FAMILY MEDICINE

## 2022-05-17 PROCEDURE — 3051F PR MOST RECENT HEMOGLOBIN A1C LEVEL 7.0 - < 8.0%: ICD-10-PCS | Mod: ,,, | Performed by: FAMILY MEDICINE

## 2022-05-17 PROCEDURE — 4010F PR ACE/ARB THEARPY RXD/TAKEN: ICD-10-PCS | Mod: ,,, | Performed by: FAMILY MEDICINE

## 2022-05-17 PROCEDURE — 96372 THER/PROPH/DIAG INJ SC/IM: CPT | Mod: ,,, | Performed by: FAMILY MEDICINE

## 2022-05-17 PROCEDURE — 1160F RVW MEDS BY RX/DR IN RCRD: CPT | Mod: ,,, | Performed by: FAMILY MEDICINE

## 2022-05-17 PROCEDURE — 3288F PR FALLS RISK ASSESSMENT DOCUMENTED: ICD-10-PCS | Mod: ,,, | Performed by: FAMILY MEDICINE

## 2022-05-17 PROCEDURE — 3288F FALL RISK ASSESSMENT DOCD: CPT | Mod: ,,, | Performed by: FAMILY MEDICINE

## 2022-05-17 PROCEDURE — 3077F PR MOST RECENT SYSTOLIC BLOOD PRESSURE >= 140 MM HG: ICD-10-PCS | Mod: ,,, | Performed by: FAMILY MEDICINE

## 2022-05-17 PROCEDURE — 3061F NEG MICROALBUMINURIA REV: CPT | Mod: ,,, | Performed by: FAMILY MEDICINE

## 2022-05-17 PROCEDURE — 4010F ACE/ARB THERAPY RXD/TAKEN: CPT | Mod: ,,, | Performed by: FAMILY MEDICINE

## 2022-05-17 PROCEDURE — 1159F MED LIST DOCD IN RCRD: CPT | Mod: ,,, | Performed by: FAMILY MEDICINE

## 2022-05-17 PROCEDURE — 3077F SYST BP >= 140 MM HG: CPT | Mod: ,,, | Performed by: FAMILY MEDICINE

## 2022-05-17 PROCEDURE — 99213 PR OFFICE/OUTPT VISIT, EST, LEVL III, 20-29 MIN: ICD-10-PCS | Mod: 25,,, | Performed by: FAMILY MEDICINE

## 2022-05-17 PROCEDURE — 3079F PR MOST RECENT DIASTOLIC BLOOD PRESSURE 80-89 MM HG: ICD-10-PCS | Mod: ,,, | Performed by: FAMILY MEDICINE

## 2022-05-17 PROCEDURE — 1101F PR PT FALLS ASSESS DOC 0-1 FALLS W/OUT INJ PAST YR: ICD-10-PCS | Mod: ,,, | Performed by: FAMILY MEDICINE

## 2022-05-17 PROCEDURE — 81003 URINALYSIS AUTO W/O SCOPE: CPT | Mod: RHCUB | Performed by: FAMILY MEDICINE

## 2022-05-17 PROCEDURE — 1101F PT FALLS ASSESS-DOCD LE1/YR: CPT | Mod: ,,, | Performed by: FAMILY MEDICINE

## 2022-05-17 PROCEDURE — 99213 OFFICE O/P EST LOW 20 MIN: CPT | Mod: 25,,, | Performed by: FAMILY MEDICINE

## 2022-05-17 PROCEDURE — 96372 PR INJECTION,THERAP/PROPH/DIAG2ST, IM OR SUBCUT: ICD-10-PCS | Mod: ,,, | Performed by: FAMILY MEDICINE

## 2022-05-17 PROCEDURE — 3066F NEPHROPATHY DOC TX: CPT | Mod: ,,, | Performed by: FAMILY MEDICINE

## 2022-05-17 PROCEDURE — 81003 PR URINALYSIS, AUTO, W/O SCOPE: ICD-10-PCS | Mod: QW,,, | Performed by: FAMILY MEDICINE

## 2022-05-17 RX ORDER — FUROSEMIDE 40 MG/1
40 TABLET ORAL DAILY
Qty: 30 TABLET | Refills: 1 | Status: SHIPPED | OUTPATIENT
Start: 2022-05-17 | End: 2022-09-12

## 2022-05-17 RX ORDER — KETOROLAC TROMETHAMINE 30 MG/ML
60 INJECTION, SOLUTION INTRAMUSCULAR; INTRAVENOUS
Status: COMPLETED | OUTPATIENT
Start: 2022-05-17 | End: 2022-05-17

## 2022-05-17 RX ORDER — CIPROFLOXACIN 500 MG/1
500 TABLET ORAL EVERY 12 HOURS
Qty: 14 TABLET | Refills: 0 | Status: SHIPPED | OUTPATIENT
Start: 2022-05-17 | End: 2022-05-24

## 2022-05-17 RX ORDER — LISINOPRIL 40 MG/1
40 TABLET ORAL DAILY
Qty: 90 TABLET | Refills: 1 | Status: SHIPPED | OUTPATIENT
Start: 2022-05-17 | End: 2022-09-09 | Stop reason: SDUPTHER

## 2022-05-17 RX ORDER — SITAGLIPTIN AND METFORMIN HYDROCHLORIDE 1000; 100 MG/1; MG/1
1 TABLET, FILM COATED, EXTENDED RELEASE ORAL DAILY
Qty: 90 TABLET | Refills: 1 | Status: SHIPPED | OUTPATIENT
Start: 2022-05-17 | End: 2022-09-09 | Stop reason: SDUPTHER

## 2022-05-17 RX ORDER — METRONIDAZOLE 500 MG/1
500 TABLET ORAL EVERY 12 HOURS
Qty: 14 TABLET | Refills: 0 | Status: SHIPPED | OUTPATIENT
Start: 2022-05-17 | End: 2022-05-24

## 2022-05-17 RX ORDER — HYDROCHLOROTHIAZIDE 12.5 MG/1
12.5-25 TABLET ORAL DAILY
Qty: 180 TABLET | Refills: 1 | Status: SHIPPED | OUTPATIENT
Start: 2022-05-17 | End: 2022-09-09 | Stop reason: SDUPTHER

## 2022-05-17 RX ADMIN — KETOROLAC TROMETHAMINE 60 MG: 30 INJECTION, SOLUTION INTRAMUSCULAR; INTRAVENOUS at 03:05

## 2022-05-17 NOTE — PROGRESS NOTES
Josselin Denise MD        PATIENT NAME: Saray Jorgensen  : 1955  DATE: 22  MRN: [id number]      Billing Provider: Josselin Denise MD  Level of Service:   Patient PCP Information     Provider PCP Type    Josselin Denise MD General          Reason for Visit / Chief Complaint: Hip Pain (Right side hip pain)       History of Present Illness      Saray Jorgensen presents to the clinic with Hip Pain (Right side hip pain)     She has been having right lower quadrant pain for the last 11 days, worsening, she is able to pass stool, there is no dysuria that she has noted. The pain comes and goes, it is sharp in nature, nothing makes it better, she has not noted what makes it worst either. She has had similar pain in the pass, but never this bad, she feels it began after her tubal ligation 30 years ago      Review of Systems     Review of Systems   Constitutional: Negative for activity change, appetite change, fatigue and fever.   Respiratory: Negative for shortness of breath.    Gastrointestinal: Positive for abdominal pain.   Genitourinary: Positive for pelvic pain. Negative for difficulty urinating, dyspareunia, dysuria, enuresis and flank pain.   Musculoskeletal: Negative for arthralgias and back pain.   Allergic/Immunologic: Positive for environmental allergies.   Psychiatric/Behavioral: Negative for agitation, behavioral problems and suicidal ideas.       Medical / Social / Family History     Past Medical History:   Diagnosis Date    Colon cancer     Diabetes     Diabetes mellitus, type 2     History of colonoscopy 2014    Hyperlipidemia     Hypertension        Past Surgical History:   Procedure Laterality Date    COLECTOMY, RIGHT         Social History  Ms.  reports that she has never smoked. She has never used smokeless tobacco. She reports that she does not drink alcohol and does not use drugs.    Family History  Ms.'s family history includes Diabetes in her brother, mother, and sister;  "Hypertension in her brother, mother, and sister; Prostate cancer in her father.    Medications and Allergies     Medications  No outpatient medications have been marked as taking for the 5/17/22 encounter (Office Visit) with Josselin Denise MD.     Current Facility-Administered Medications for the 5/17/22 encounter (Office Visit) with Josselin Denise MD   Medication Dose Route Frequency Provider Last Rate Last Admin    [COMPLETED] ketorolac injection 60 mg  60 mg Intramuscular 1 time in Clinic/HOD Josselin Denise MD   60 mg at 05/17/22 1523       Allergies  Review of patient's allergies indicates:  No Known Allergies    Physical Examination   BP (!) 149/87   Pulse 87   Temp 98.3 °F (36.8 °C) (Temporal)   Resp 18   Ht 5' 10" (1.778 m)   Wt (!) 137.4 kg (303 lb)   SpO2 97%   BMI 43.48 kg/m²     Physical Exam  Constitutional:       Appearance: She is obese.   Cardiovascular:      Rate and Rhythm: Normal rate and regular rhythm.      Pulses: Normal pulses.      Heart sounds: Normal heart sounds.   Abdominal:      Tenderness: There is abdominal tenderness. There is right CVA tenderness. There is no rebound.      Hernia: No hernia is present.   Musculoskeletal:         General: Normal range of motion.   Skin:     General: Skin is warm.   Neurological:      General: No focal deficit present.      Mental Status: She is alert.   Psychiatric:         Mood and Affect: Mood normal.         Behavior: Behavior normal.         Judgment: Judgment normal.         Recent Results (from the past 24 hour(s))   POCT URINALYSIS W/O SCOPE    Collection Time: 05/17/22  3:18 PM   Result Value Ref Range    Color, UA Light Yellow     Spec Grav UA 1.010     pH, UA 5.0     WBC, UA negative     Nitrite, UA negative     Protein, POC negative     Glucose, UA negative     Ketones, UA negative     Bilirubin, POC negative     Urobilinogen, UA 0.2     Blood, UA negative         Assessment and Plan (including Health Maintenance)     Plan: "         Problem List Items Addressed This Visit        Cardiac/Vascular    Essential hypertension    Relevant Medications    hydroCHLOROthiazide (HYDRODIURIL) 12.5 MG Tab    lisinopriL (PRINIVIL,ZESTRIL) 40 MG tablet    furosemide (LASIX) 40 MG tablet       Endocrine    Type 2 diabetes mellitus without complication, without long-term current use of insulin    Relevant Medications    JANUMET -1,000 mg TM24      Other Visit Diagnoses     Right flank pain    -  Primary    Relevant Orders    POCT URINALYSIS W/O SCOPE (Completed)    Right lower quadrant abdominal pain        Relevant Medications    ketorolac injection 60 mg (Completed) (Start on 5/17/2022  3:30 PM)    ciprofloxacin HCl (CIPRO) 500 MG tablet    metroNIDAZOLE (FLAGYL) 500 MG tablet    Other Relevant Orders    CT Abdomen Pelvis  Without Contrast    Abdominal pain, unspecified abdominal location            - I refilled her medications  - I am sending her for a CT and as this will allow me to rule out gall stones, appendicitis, diverticulitis, and ovarian cyst.     Follow up in about 1 month (around 6/17/2022).        Signature:  Josselin Denise MD      Date of encounter: 5/17/22

## 2022-05-27 ENCOUNTER — HOSPITAL ENCOUNTER (OUTPATIENT)
Dept: RADIOLOGY | Facility: HOSPITAL | Age: 67
Discharge: HOME | End: 2022-05-27
Attending: FAMILY MEDICINE
Payer: COMMERCIAL

## 2022-05-27 DIAGNOSIS — R10.31 RIGHT LOWER QUADRANT ABDOMINAL PAIN: ICD-10-CM

## 2022-05-27 PROBLEM — C18.9 COLON CANCER: Status: ACTIVE | Noted: 2022-05-27

## 2022-05-27 PROCEDURE — 74176 CT ABD & PELVIS W/O CONTRAST: CPT | Mod: TC

## 2022-05-31 ENCOUNTER — TELEPHONE (OUTPATIENT)
Dept: FAMILY MEDICINE | Facility: CLINIC | Age: 67
End: 2022-05-31
Payer: COMMERCIAL

## 2022-05-31 NOTE — TELEPHONE ENCOUNTER
Pt stated she is doing better at them moment. She do plan to watch what she eats. If she have any more questions she will call the clinic

## 2022-05-31 NOTE — TELEPHONE ENCOUNTER
----- Message from Josselin Denise MD sent at 5/31/2022  7:35 AM CDT -----  Gall stones is present, renal stones are present, uterus has fibroids, so how is she doing with pain? The pain on the right side can be caused by the gall bladder since it does have stones, now this does not necessarily mean that she needs her gall bladder removed, but it does mean she needs to watch what she eats even more, when she eats anything fatty, any fast food, pizza, fried she will get pain from the gall bladder. She also has renal stones, kidney stones, which can also cause some pain, now I do not think that is what is going on since there was no blood in her urine when she came in. If she has any questions she is welcome to come in and see me

## 2022-06-27 ENCOUNTER — OFFICE VISIT (OUTPATIENT)
Dept: FAMILY MEDICINE | Facility: CLINIC | Age: 67
End: 2022-06-27
Payer: COMMERCIAL

## 2022-06-27 VITALS
DIASTOLIC BLOOD PRESSURE: 70 MMHG | HEART RATE: 85 BPM | SYSTOLIC BLOOD PRESSURE: 141 MMHG | HEIGHT: 70 IN | WEIGHT: 293 LBS | TEMPERATURE: 98 F | BODY MASS INDEX: 41.95 KG/M2 | OXYGEN SATURATION: 98 % | RESPIRATION RATE: 20 BRPM

## 2022-06-27 DIAGNOSIS — D21.9 FIBROIDS: ICD-10-CM

## 2022-06-27 DIAGNOSIS — K80.20 GALL STONES: Primary | ICD-10-CM

## 2022-06-27 DIAGNOSIS — N20.0 RENAL STONES: ICD-10-CM

## 2022-06-27 PROCEDURE — 1101F PT FALLS ASSESS-DOCD LE1/YR: CPT | Mod: ,,, | Performed by: FAMILY MEDICINE

## 2022-06-27 PROCEDURE — 1160F RVW MEDS BY RX/DR IN RCRD: CPT | Mod: ,,, | Performed by: FAMILY MEDICINE

## 2022-06-27 PROCEDURE — 1160F PR REVIEW ALL MEDS BY PRESCRIBER/CLIN PHARMACIST DOCUMENTED: ICD-10-PCS | Mod: ,,, | Performed by: FAMILY MEDICINE

## 2022-06-27 PROCEDURE — 3077F PR MOST RECENT SYSTOLIC BLOOD PRESSURE >= 140 MM HG: ICD-10-PCS | Mod: ,,, | Performed by: FAMILY MEDICINE

## 2022-06-27 PROCEDURE — 99213 PR OFFICE/OUTPT VISIT, EST, LEVL III, 20-29 MIN: ICD-10-PCS | Mod: ,,, | Performed by: FAMILY MEDICINE

## 2022-06-27 PROCEDURE — 3051F HG A1C>EQUAL 7.0%<8.0%: CPT | Mod: ,,, | Performed by: FAMILY MEDICINE

## 2022-06-27 PROCEDURE — 3077F SYST BP >= 140 MM HG: CPT | Mod: ,,, | Performed by: FAMILY MEDICINE

## 2022-06-27 PROCEDURE — 1159F MED LIST DOCD IN RCRD: CPT | Mod: ,,, | Performed by: FAMILY MEDICINE

## 2022-06-27 PROCEDURE — 3008F PR BODY MASS INDEX (BMI) DOCUMENTED: ICD-10-PCS | Mod: ,,, | Performed by: FAMILY MEDICINE

## 2022-06-27 PROCEDURE — 3078F PR MOST RECENT DIASTOLIC BLOOD PRESSURE < 80 MM HG: ICD-10-PCS | Mod: ,,, | Performed by: FAMILY MEDICINE

## 2022-06-27 PROCEDURE — 3288F PR FALLS RISK ASSESSMENT DOCUMENTED: ICD-10-PCS | Mod: ,,, | Performed by: FAMILY MEDICINE

## 2022-06-27 PROCEDURE — 3066F NEPHROPATHY DOC TX: CPT | Mod: ,,, | Performed by: FAMILY MEDICINE

## 2022-06-27 PROCEDURE — 4010F ACE/ARB THERAPY RXD/TAKEN: CPT | Mod: ,,, | Performed by: FAMILY MEDICINE

## 2022-06-27 PROCEDURE — 3061F NEG MICROALBUMINURIA REV: CPT | Mod: ,,, | Performed by: FAMILY MEDICINE

## 2022-06-27 PROCEDURE — 3051F PR MOST RECENT HEMOGLOBIN A1C LEVEL 7.0 - < 8.0%: ICD-10-PCS | Mod: ,,, | Performed by: FAMILY MEDICINE

## 2022-06-27 PROCEDURE — 3078F DIAST BP <80 MM HG: CPT | Mod: ,,, | Performed by: FAMILY MEDICINE

## 2022-06-27 PROCEDURE — 1159F PR MEDICATION LIST DOCUMENTED IN MEDICAL RECORD: ICD-10-PCS | Mod: ,,, | Performed by: FAMILY MEDICINE

## 2022-06-27 PROCEDURE — 3008F BODY MASS INDEX DOCD: CPT | Mod: ,,, | Performed by: FAMILY MEDICINE

## 2022-06-27 PROCEDURE — 4010F PR ACE/ARB THEARPY RXD/TAKEN: ICD-10-PCS | Mod: ,,, | Performed by: FAMILY MEDICINE

## 2022-06-27 PROCEDURE — 3061F PR NEG MICROALBUMINURIA RESULT DOCUMENTED/REVIEW: ICD-10-PCS | Mod: ,,, | Performed by: FAMILY MEDICINE

## 2022-06-27 PROCEDURE — 99213 OFFICE O/P EST LOW 20 MIN: CPT | Mod: ,,, | Performed by: FAMILY MEDICINE

## 2022-06-27 PROCEDURE — 3288F FALL RISK ASSESSMENT DOCD: CPT | Mod: ,,, | Performed by: FAMILY MEDICINE

## 2022-06-27 PROCEDURE — 1101F PR PT FALLS ASSESS DOC 0-1 FALLS W/OUT INJ PAST YR: ICD-10-PCS | Mod: ,,, | Performed by: FAMILY MEDICINE

## 2022-06-27 PROCEDURE — 3066F PR DOCUMENTATION OF TREATMENT FOR NEPHROPATHY: ICD-10-PCS | Mod: ,,, | Performed by: FAMILY MEDICINE

## 2022-06-27 NOTE — PROGRESS NOTES
Josselin Denise MD        PATIENT NAME: Saray Jorgensen  : 1955  DATE: 22  MRN: [id number]      Billing Provider: Josselin Denise MD  Level of Service:   Patient PCP Information     Provider PCP Type    Josselin Denise MD General          Reason for Visit / Chief Complaint: Annual Exam (Talk about ovaries)       History of Present Illness      Saray Jorgensen presents to the clinic with Annual Exam (Talk about ovaries)     CT abd reviewed with pt Gall stones is present, renal stones are present, uterus has fibroids,,The pain on the right side that she was experiencing can be caused by the gall bladder since it does have stones, now this does not necessarily mean that she needs her gall bladder removed, but it does mean she needs to watch what she eats even more, when she eats anything fatty, any fast food, pizza, fried she will get pain from the gall bladder. She did make this changes already and felt much better, so it leads me to believe that this is the case     She also has renal stones, kidney stones, which can also cause some pain, now I do not think that is what was going on since there was no blood in her urine when she came in    Fibroids reported as well, but she is having no symptoms from this               Review of Systems     Review of Systems   Constitutional: Negative for activity change and unexpected weight change.   HENT: Negative for hearing loss, rhinorrhea and trouble swallowing.    Eyes: Negative for discharge and visual disturbance.   Respiratory: Negative for chest tightness and wheezing.    Cardiovascular: Negative for chest pain and palpitations.   Gastrointestinal: Negative for blood in stool, constipation, diarrhea and vomiting.   Endocrine: Negative for polydipsia and polyuria.   Genitourinary: Negative for difficulty urinating, dysuria, hematuria and menstrual problem.   Musculoskeletal: Negative for arthralgias, joint swelling and neck pain.   Neurological: Negative for  weakness and headaches.   Psychiatric/Behavioral: Negative for confusion and dysphoric mood.       Medical / Social / Family History     Past Medical History:   Diagnosis Date    Colon cancer     Diabetes     Diabetes mellitus, type 2     History of colonoscopy 11/03/2014    Hyperlipidemia     Hypertension        Past Surgical History:   Procedure Laterality Date    COLECTOMY, RIGHT         Social History  Ms.  reports that she has never smoked. She has never used smokeless tobacco. She reports that she does not drink alcohol and does not use drugs.    Family History  Ms.'s family history includes Diabetes in her brother, mother, and sister; Hypertension in her brother, mother, and sister; Prostate cancer in her father.    Medications and Allergies     Medications  Outpatient Medications Marked as Taking for the 6/27/22 encounter (Office Visit) with Josselin Denise MD   Medication Sig Dispense Refill    ascorbic acid-multivit-min (VITAMIN C ENERGY BOOSTER) 1,000 mg PwEP Take 1 packet by mouth once daily.      ascorbic acid-multivit-min (VITAMIN C FIZZY DRINK) 1,000 mg PwEP Take 1 packet by mouth once daily.      aspirin 325 MG tablet Take 1 tablet (325 mg total) by mouth once daily. 90 tablet 2    calcium carbonate/vitamin D3 (CALCIUM 600 WITH VITAMIN D3 ORAL) Take 1 tablet by mouth once daily.      cetirizine (ZYRTEC) 10 MG tablet Take 1 tablet (10 mg total) by mouth once daily. 90 tablet 1    citalopram (CELEXA) 20 MG tablet Take 1 tablet (20 mg total) by mouth once daily. 30 tablet 1    furosemide (LASIX) 40 MG tablet Take 1 tablet (40 mg total) by mouth once daily. 30 tablet 1    GARLIC ORAL Take 1 tablet by mouth once daily.      hydroCHLOROthiazide (HYDRODIURIL) 12.5 MG Tab Take 1-2 tablets (12.5-25 mg total) by mouth once daily. 180 tablet 1    JANUMET -1,000 mg TM24 Take 1 tablet by mouth once daily. 90 tablet 1    lisinopriL (PRINIVIL,ZESTRIL) 40 MG tablet Take 1 tablet (40 mg total)  "by mouth once daily. 90 tablet 1    meloxicam (MOBIC) 7.5 MG tablet TAKE 1 TO 2 TABLETS BY MOUTH EVERY DAY 90 tablet 1    multivitamin (THERAGRAN) per tablet Take 1 tablet by mouth once daily.      olopatadine (PATADAY) 0.2 % Drop       psyllium 0.52 gram capsule Take 0.52 g by mouth 2 (two) times a day.      simvastatin (ZOCOR) 40 MG tablet Take 1 tablet (40 mg total) by mouth nightly. 30 tablet 1       Allergies  Review of patient's allergies indicates:  No Known Allergies    Physical Examination   BP (!) 141/70 (BP Location: Right arm, Patient Position: Sitting, BP Method: Medium (Automatic))   Pulse 85   Temp 97.6 °F (36.4 °C) (Oral)   Resp 20   Ht 5' 10" (1.778 m)   Wt (!) 138.8 kg (306 lb)   SpO2 98%   BMI 43.91 kg/m²     Physical Exam  Constitutional:       Appearance: Normal appearance. She is obese.   Cardiovascular:      Rate and Rhythm: Normal rate and regular rhythm.      Pulses: Normal pulses.      Heart sounds: Normal heart sounds.   Pulmonary:      Effort: Pulmonary effort is normal.      Breath sounds: Normal breath sounds.   Abdominal:      General: Abdomen is flat. Bowel sounds are normal. There is no distension.      Palpations: Abdomen is soft.      Tenderness: There is no abdominal tenderness.   Musculoskeletal:         General: Normal range of motion.   Skin:     General: Skin is warm.   Neurological:      General: No focal deficit present.      Mental Status: She is alert.   Psychiatric:         Mood and Affect: Mood normal.         Behavior: Behavior normal.         Judgment: Judgment normal.           Assessment and Plan (including Health Maintenance)     Plan:         Problem List Items Addressed This Visit    None     Visit Diagnoses     Gall stones    -  Primary    Fibroids        Renal stones            - for now will just watch and see, discussed with the pt today, she agreed to this, she is feeling much better and has not pain     Follow up in about 3 months (around " 9/27/2022).        Signature:  Josselin Denise MD      Date of encounter: 6/27/22

## 2022-08-01 ENCOUNTER — OFFICE VISIT (OUTPATIENT)
Dept: OBSTETRICS AND GYNECOLOGY | Facility: CLINIC | Age: 67
End: 2022-08-01
Payer: COMMERCIAL

## 2022-08-01 VITALS
DIASTOLIC BLOOD PRESSURE: 74 MMHG | RESPIRATION RATE: 18 BRPM | SYSTOLIC BLOOD PRESSURE: 139 MMHG | HEIGHT: 70 IN | HEART RATE: 79 BPM | WEIGHT: 293 LBS | BODY MASS INDEX: 41.95 KG/M2

## 2022-08-01 DIAGNOSIS — Z85.038 HISTORY OF COLON CANCER, STAGE IV: Primary | ICD-10-CM

## 2022-08-01 DIAGNOSIS — I10 ESSENTIAL HYPERTENSION: ICD-10-CM

## 2022-08-01 DIAGNOSIS — E11.9 TYPE 2 DIABETES MELLITUS WITHOUT COMPLICATION, WITHOUT LONG-TERM CURRENT USE OF INSULIN: ICD-10-CM

## 2022-08-01 DIAGNOSIS — R10.2 FEMALE PELVIC PAIN: ICD-10-CM

## 2022-08-01 PROCEDURE — 3066F NEPHROPATHY DOC TX: CPT | Mod: CPTII,,, | Performed by: OBSTETRICS & GYNECOLOGY

## 2022-08-01 PROCEDURE — 3078F DIAST BP <80 MM HG: CPT | Mod: CPTII,,, | Performed by: OBSTETRICS & GYNECOLOGY

## 2022-08-01 PROCEDURE — 3008F BODY MASS INDEX DOCD: CPT | Mod: CPTII,,, | Performed by: OBSTETRICS & GYNECOLOGY

## 2022-08-01 PROCEDURE — 3075F PR MOST RECENT SYSTOLIC BLOOD PRESS GE 130-139MM HG: ICD-10-PCS | Mod: CPTII,,, | Performed by: OBSTETRICS & GYNECOLOGY

## 2022-08-01 PROCEDURE — 3051F HG A1C>EQUAL 7.0%<8.0%: CPT | Mod: CPTII,,, | Performed by: OBSTETRICS & GYNECOLOGY

## 2022-08-01 PROCEDURE — 3061F NEG MICROALBUMINURIA REV: CPT | Mod: CPTII,,, | Performed by: OBSTETRICS & GYNECOLOGY

## 2022-08-01 PROCEDURE — 4010F PR ACE/ARB THEARPY RXD/TAKEN: ICD-10-PCS | Mod: CPTII,,, | Performed by: OBSTETRICS & GYNECOLOGY

## 2022-08-01 PROCEDURE — 99203 PR OFFICE/OUTPT VISIT, NEW, LEVL III, 30-44 MIN: ICD-10-PCS | Mod: ,,, | Performed by: OBSTETRICS & GYNECOLOGY

## 2022-08-01 PROCEDURE — 3051F PR MOST RECENT HEMOGLOBIN A1C LEVEL 7.0 - < 8.0%: ICD-10-PCS | Mod: CPTII,,, | Performed by: OBSTETRICS & GYNECOLOGY

## 2022-08-01 PROCEDURE — 99203 OFFICE O/P NEW LOW 30 MIN: CPT | Mod: ,,, | Performed by: OBSTETRICS & GYNECOLOGY

## 2022-08-01 PROCEDURE — 4010F ACE/ARB THERAPY RXD/TAKEN: CPT | Mod: CPTII,,, | Performed by: OBSTETRICS & GYNECOLOGY

## 2022-08-01 PROCEDURE — 3061F PR NEG MICROALBUMINURIA RESULT DOCUMENTED/REVIEW: ICD-10-PCS | Mod: CPTII,,, | Performed by: OBSTETRICS & GYNECOLOGY

## 2022-08-01 PROCEDURE — 3066F PR DOCUMENTATION OF TREATMENT FOR NEPHROPATHY: ICD-10-PCS | Mod: CPTII,,, | Performed by: OBSTETRICS & GYNECOLOGY

## 2022-08-01 PROCEDURE — 3075F SYST BP GE 130 - 139MM HG: CPT | Mod: CPTII,,, | Performed by: OBSTETRICS & GYNECOLOGY

## 2022-08-01 PROCEDURE — 3008F PR BODY MASS INDEX (BMI) DOCUMENTED: ICD-10-PCS | Mod: CPTII,,, | Performed by: OBSTETRICS & GYNECOLOGY

## 2022-08-01 PROCEDURE — 3078F PR MOST RECENT DIASTOLIC BLOOD PRESSURE < 80 MM HG: ICD-10-PCS | Mod: CPTII,,, | Performed by: OBSTETRICS & GYNECOLOGY

## 2022-08-01 NOTE — PROGRESS NOTES
History & Physical    SUBJECTIVE:     History of Present Illness:  Patient is a 66 y.o. female presents with right sided pelvic pain. Pt states she had a CT scan completed which showed fibroids in uterus. Pt reports being a 14 year colon cancer survivor. Pt states she goes to Oncology in [place], [place] () pt was seen in January 2022. Pt see  for her diabetes. Her last A1C was 7.2. Pt states the last time she has had any pain was in May 6, 2022.    Chief Complaint   Patient presents with    Other Misc     Patient reports pain on right side; Review CT Pelvis from 05/27/2022       Review of patient's allergies indicates:  No Known Allergies    Current Outpatient Medications   Medication Sig Dispense Refill    ascorbic acid-multivit-min (VITAMIN C ENERGY BOOSTER) 1,000 mg PwEP Take 1 packet by mouth once daily.      ascorbic acid-multivit-min (VITAMIN C FIZZY DRINK) 1,000 mg PwEP Take 1 packet by mouth once daily.      aspirin 325 MG tablet Take 1 tablet (325 mg total) by mouth once daily. 90 tablet 2    calcium carbonate/vitamin D3 (CALCIUM 600 WITH VITAMIN D3 ORAL) Take 1 tablet by mouth once daily.      cetirizine (ZYRTEC) 10 MG tablet Take 1 tablet (10 mg total) by mouth once daily. 90 tablet 1    citalopram (CELEXA) 20 MG tablet Take 1 tablet (20 mg total) by mouth once daily. 30 tablet 1    furosemide (LASIX) 40 MG tablet Take 1 tablet (40 mg total) by mouth once daily. 30 tablet 1    GARLIC ORAL Take 1 tablet by mouth once daily.      hydroCHLOROthiazide (HYDRODIURIL) 12.5 MG Tab Take 1-2 tablets (12.5-25 mg total) by mouth once daily. 180 tablet 1    JANUMET -1,000 mg TM24 Take 1 tablet by mouth once daily. 90 tablet 1    lisinopriL (PRINIVIL,ZESTRIL) 40 MG tablet Take 1 tablet (40 mg total) by mouth once daily. 90 tablet 1    meloxicam (MOBIC) 7.5 MG tablet TAKE 1 TO 2 TABLETS BY MOUTH EVERY DAY 90 tablet 1    multivitamin (THERAGRAN) per tablet Take 1 tablet by mouth once  daily.      olopatadine (PATADAY) 0.2 % Drop       psyllium 0.52 gram capsule Take 0.52 g by mouth 2 (two) times a day.      simvastatin (ZOCOR) 40 MG tablet TAKE ONE TABLET BY MOUTH NIGHTLY AT BEDTIME 30 tablet 1     No current facility-administered medications for this visit.       Past Medical History:   Diagnosis Date    Colon cancer     Diabetes     Diabetes mellitus, type 2     History of colonoscopy 11/03/2014    Hyperlipidemia     Hypertension      Past Surgical History:   Procedure Laterality Date    COLECTOMY, RIGHT       Family History   Problem Relation Age of Onset    Diabetes Mother     Hypertension Mother     Prostate cancer Father     Diabetes Sister     Hypertension Sister     Diabetes Brother     Hypertension Brother      Social History     Tobacco Use    Smoking status: Never Smoker    Smokeless tobacco: Never Used   Substance Use Topics    Alcohol use: Never    Drug use: Never        Review of Systems:  Review of Systems   Constitutional: Negative for appetite change, chills, fatigue and fever.   HENT: Negative.    Eyes: Negative.    Respiratory: Negative for cough, chest tightness and shortness of breath.    Cardiovascular: Negative for chest pain, palpitations and leg swelling.   Gastrointestinal: Negative for abdominal distention, abdominal pain, blood in stool, constipation, diarrhea, nausea and vomiting.   Endocrine: Negative for cold intolerance, heat intolerance, polydipsia, polyphagia and polyuria.   Genitourinary: Negative for difficulty urinating, dyspareunia, dysuria, flank pain, frequency, pelvic pain, urgency, vaginal bleeding, vaginal discharge and vaginal pain.   Musculoskeletal: Negative.    Skin: Negative.    Neurological: Negative.    Psychiatric/Behavioral: Negative.  Negative for agitation, behavioral problems, confusion and sleep disturbance. The patient is not nervous/anxious.        OBJECTIVE:     Vital Signs (Most Recent)  Pulse: 79 (08/01/22  "1545)  Resp: 18 (08/01/22 1539)  BP: 139/74 (08/01/22 1545)  5' 10" (1.778 m)  134.9 kg (297 lb 6.4 oz)     Physical Exam:  Physical Exam  Vitals reviewed. Exam conducted with a chaperone present.   Constitutional:       Appearance: Normal appearance.   HENT:      Head: Normocephalic and atraumatic.      Mouth/Throat:      Mouth: Mucous membranes are moist.   Eyes:      Extraocular Movements: Extraocular movements intact.      Pupils: Pupils are equal, round, and reactive to light.   Cardiovascular:      Rate and Rhythm: Normal rate and regular rhythm.      Pulses: Normal pulses.      Heart sounds: Normal heart sounds.   Pulmonary:      Effort: Pulmonary effort is normal.      Breath sounds: Normal breath sounds.   Abdominal:      General: Abdomen is flat. Bowel sounds are normal.      Palpations: Abdomen is soft.   Musculoskeletal:         General: Normal range of motion.      Cervical back: Normal range of motion.   Skin:     General: Skin is warm and dry.   Neurological:      General: No focal deficit present.      Mental Status: She is alert and oriented to person, place, and time.   Psychiatric:         Mood and Affect: Mood normal.         Behavior: Behavior normal.         Thought Content: Thought content normal.         Judgment: Judgment normal.         Laboratory  Most recent labs reviewed    Diagnostic Results:  CT scan reviewed    ASSESSMENT/PLAN:   Saray was seen today for other misc.    Diagnoses and all orders for this visit:    History of colon cancer, stage IV    Type 2 diabetes mellitus without complication, without long-term current use of insulin    Essential hypertension    Female pelvic pain  -     US Pelvis Complete Non OB; Future        PLAN:Plan      RTC in 1 week  "

## 2022-08-18 ENCOUNTER — PROCEDURE VISIT (OUTPATIENT)
Dept: OBSTETRICS AND GYNECOLOGY | Facility: CLINIC | Age: 67
End: 2022-08-18
Payer: COMMERCIAL

## 2022-08-18 DIAGNOSIS — R10.2 FEMALE PELVIC PAIN: Primary | ICD-10-CM

## 2022-08-18 PROCEDURE — 99499 UNLISTED E&M SERVICE: CPT | Mod: ,,, | Performed by: OBSTETRICS & GYNECOLOGY

## 2022-08-18 PROCEDURE — 76856 PR  ECHO,PELVIC (NONOBSTETRIC): ICD-10-PCS | Mod: ,,, | Performed by: OBSTETRICS & GYNECOLOGY

## 2022-08-18 PROCEDURE — 76856 US EXAM PELVIC COMPLETE: CPT | Mod: ,,, | Performed by: OBSTETRICS & GYNECOLOGY

## 2022-08-18 PROCEDURE — 99499 NO LOS: ICD-10-PCS | Mod: ,,, | Performed by: OBSTETRICS & GYNECOLOGY

## 2022-09-12 ENCOUNTER — OFFICE VISIT (OUTPATIENT)
Dept: OBSTETRICS AND GYNECOLOGY | Facility: CLINIC | Age: 67
End: 2022-09-12
Payer: COMMERCIAL

## 2022-09-12 VITALS
SYSTOLIC BLOOD PRESSURE: 143 MMHG | BODY MASS INDEX: 41.95 KG/M2 | HEIGHT: 70 IN | HEART RATE: 78 BPM | DIASTOLIC BLOOD PRESSURE: 66 MMHG | WEIGHT: 293 LBS

## 2022-09-12 DIAGNOSIS — R93.89 THICKENED ENDOMETRIUM: Primary | ICD-10-CM

## 2022-09-12 PROCEDURE — 99214 PR OFFICE/OUTPT VISIT, EST, LEVL IV, 30-39 MIN: ICD-10-PCS | Mod: ,,, | Performed by: OBSTETRICS & GYNECOLOGY

## 2022-09-12 PROCEDURE — 99214 OFFICE O/P EST MOD 30 MIN: CPT | Mod: ,,, | Performed by: OBSTETRICS & GYNECOLOGY

## 2022-09-12 PROCEDURE — 3060F POS MICROALBUMINURIA REV: CPT | Mod: CPTII,,, | Performed by: OBSTETRICS & GYNECOLOGY

## 2022-09-12 PROCEDURE — 3008F BODY MASS INDEX DOCD: CPT | Mod: CPTII,,, | Performed by: OBSTETRICS & GYNECOLOGY

## 2022-09-12 PROCEDURE — 4010F PR ACE/ARB THEARPY RXD/TAKEN: ICD-10-PCS | Mod: CPTII,,, | Performed by: OBSTETRICS & GYNECOLOGY

## 2022-09-12 PROCEDURE — 3060F PR POS MICROALBUMINURIA RESULT DOCUMENTED/REVIEW: ICD-10-PCS | Mod: CPTII,,, | Performed by: OBSTETRICS & GYNECOLOGY

## 2022-09-12 PROCEDURE — 3078F PR MOST RECENT DIASTOLIC BLOOD PRESSURE < 80 MM HG: ICD-10-PCS | Mod: CPTII,,, | Performed by: OBSTETRICS & GYNECOLOGY

## 2022-09-12 PROCEDURE — 3044F HG A1C LEVEL LT 7.0%: CPT | Mod: CPTII,,, | Performed by: OBSTETRICS & GYNECOLOGY

## 2022-09-12 PROCEDURE — 3077F PR MOST RECENT SYSTOLIC BLOOD PRESSURE >= 140 MM HG: ICD-10-PCS | Mod: CPTII,,, | Performed by: OBSTETRICS & GYNECOLOGY

## 2022-09-12 PROCEDURE — 3078F DIAST BP <80 MM HG: CPT | Mod: CPTII,,, | Performed by: OBSTETRICS & GYNECOLOGY

## 2022-09-12 PROCEDURE — 3066F PR DOCUMENTATION OF TREATMENT FOR NEPHROPATHY: ICD-10-PCS | Mod: CPTII,,, | Performed by: OBSTETRICS & GYNECOLOGY

## 2022-09-12 PROCEDURE — 3008F PR BODY MASS INDEX (BMI) DOCUMENTED: ICD-10-PCS | Mod: CPTII,,, | Performed by: OBSTETRICS & GYNECOLOGY

## 2022-09-12 PROCEDURE — 4010F ACE/ARB THERAPY RXD/TAKEN: CPT | Mod: CPTII,,, | Performed by: OBSTETRICS & GYNECOLOGY

## 2022-09-12 PROCEDURE — 3066F NEPHROPATHY DOC TX: CPT | Mod: CPTII,,, | Performed by: OBSTETRICS & GYNECOLOGY

## 2022-09-12 PROCEDURE — 3044F PR MOST RECENT HEMOGLOBIN A1C LEVEL <7.0%: ICD-10-PCS | Mod: CPTII,,, | Performed by: OBSTETRICS & GYNECOLOGY

## 2022-09-12 PROCEDURE — 3077F SYST BP >= 140 MM HG: CPT | Mod: CPTII,,, | Performed by: OBSTETRICS & GYNECOLOGY

## 2022-09-12 NOTE — PROGRESS NOTES
Subjective:       Patient ID: Saray Jorgensen is a 66 y.o. female.    Chief Complaint:  Follow-up (F/U due to pelvic pain)      History of Present Illness  Patient is a 66 y.o. female presents with right sided pelvic pain. Pt states she had a CT scan completed which showed fibroids in uterus. Pt reports being a 14 year colon cancer survivor. Pt states she goes to Oncology in [place], [place] () pt was seen in January 2022. Pt see  for her diabetes. Her last A1C was 7.2. Pt states the last time she has had any pain was in May 6, 2022.  Pt had an ultrasound done last month that shows a thickened endometrial lining.  She denies vaginal bleeding         GYN & OB History  No LMP recorded. Patient is postmenopausal.   Date of Last Pap: No result found    OB History   No obstetric history on file.       Review of Systems  Review of Systems   Constitutional:  Negative for activity change, appetite change, fatigue and unexpected weight change.   HENT: Negative.     Respiratory:  Negative for cough, shortness of breath and wheezing.    Cardiovascular:  Negative for chest pain, palpitations and leg swelling.   Gastrointestinal:  Negative for abdominal pain, bloating, blood in stool, constipation, diarrhea, nausea, vomiting and reflux.   Genitourinary:  Negative for bladder incontinence, decreased libido, dysmenorrhea, dyspareunia, dysuria, flank pain, frequency, hot flashes, menorrhagia, menstrual problem, pelvic pain, urgency, vaginal bleeding, vaginal discharge, urinary incontinence, postcoital bleeding, postmenopausal bleeding, vaginal dryness and vaginal odor.   Musculoskeletal:  Negative for back pain.   Integumentary:  Negative for rash, acne, hair changes, mole/lesion, breast mass, nipple discharge, breast skin changes and breast tenderness.   Neurological:  Negative for headaches.   Psychiatric/Behavioral:  Negative for depression and sleep disturbance. The patient is not nervous/anxious.    Breast:  Negative for asymmetry, breast self exam, lump, mass, nipple discharge, skin changes and tenderness        Objective:    Physical Exam:   Constitutional: She is oriented to person, place, and time. She appears well-developed and well-nourished.    HENT:   Head: Normocephalic and atraumatic.    Eyes: Pupils are equal, round, and reactive to light.     Cardiovascular:  Normal rate, regular rhythm, normal heart sounds and intact distal pulses.      Exam reveals no clubbing, no cyanosis and no edema.        Pulmonary/Chest: Effort normal and breath sounds normal.        Abdominal: Soft. Bowel sounds are normal.     Genitourinary:    Vagina and uterus normal.             Musculoskeletal: Normal range of motion and moves all extremeties. No edema.       Neurological: She is alert and oriented to person, place, and time.    Skin: Skin is warm and dry. No cyanosis. Nails show no clubbing.    Psychiatric: She has a normal mood and affect. Her behavior is normal. Judgment and thought content normal.        Assessment:        1. Thickened endometrium                Plan:    Ultrasound results reviewed with the pt.   RTC in 2 weeks for Hysteroscopy- Emb due to thickened lining if the endometrium

## 2022-09-27 ENCOUNTER — OFFICE VISIT (OUTPATIENT)
Dept: FAMILY MEDICINE | Facility: CLINIC | Age: 67
End: 2022-09-27
Payer: COMMERCIAL

## 2022-09-27 VITALS
BODY MASS INDEX: 41.95 KG/M2 | WEIGHT: 293 LBS | SYSTOLIC BLOOD PRESSURE: 148 MMHG | HEIGHT: 70 IN | HEART RATE: 67 BPM | DIASTOLIC BLOOD PRESSURE: 86 MMHG | OXYGEN SATURATION: 99 % | RESPIRATION RATE: 20 BRPM

## 2022-09-27 DIAGNOSIS — Z23 NEED FOR VACCINATION: ICD-10-CM

## 2022-09-27 DIAGNOSIS — E66.01 CLASS 3 SEVERE OBESITY WITH BODY MASS INDEX (BMI) OF 40.0 TO 44.9 IN ADULT, UNSPECIFIED OBESITY TYPE, UNSPECIFIED WHETHER SERIOUS COMORBIDITY PRESENT: ICD-10-CM

## 2022-09-27 DIAGNOSIS — E11.9 TYPE 2 DIABETES MELLITUS WITHOUT COMPLICATION, WITHOUT LONG-TERM CURRENT USE OF INSULIN: Primary | ICD-10-CM

## 2022-09-27 DIAGNOSIS — J30.1 SEASONAL ALLERGIC RHINITIS DUE TO POLLEN: ICD-10-CM

## 2022-09-27 DIAGNOSIS — E78.5 HYPERLIPIDEMIA, UNSPECIFIED HYPERLIPIDEMIA TYPE: ICD-10-CM

## 2022-09-27 DIAGNOSIS — F33.42 RECURRENT MAJOR DEPRESSIVE DISORDER, IN FULL REMISSION: ICD-10-CM

## 2022-09-27 DIAGNOSIS — I10 ESSENTIAL HYPERTENSION: ICD-10-CM

## 2022-09-27 LAB
ALBUMIN SERPL BCP-MCNC: 3.4 G/DL (ref 3.5–5)
ALBUMIN/GLOB SERPL: 0.8 {RATIO}
ALP SERPL-CCNC: 66 U/L (ref 55–142)
ALT SERPL W P-5'-P-CCNC: 19 U/L (ref 13–56)
ANION GAP SERPL CALCULATED.3IONS-SCNC: 6 MMOL/L (ref 7–16)
AST SERPL W P-5'-P-CCNC: 12 U/L (ref 15–37)
BASOPHILS # BLD AUTO: 0.03 K/UL (ref 0–0.2)
BASOPHILS NFR BLD AUTO: 0.4 % (ref 0–1)
BILIRUB SERPL-MCNC: 0.3 MG/DL (ref ?–1.2)
BUN SERPL-MCNC: 20 MG/DL (ref 7–18)
BUN/CREAT SERPL: 22 (ref 6–20)
CALCIUM SERPL-MCNC: 9.7 MG/DL (ref 8.5–10.1)
CHLORIDE SERPL-SCNC: 106 MMOL/L (ref 98–107)
CHOLEST SERPL-MCNC: 169 MG/DL (ref 0–200)
CHOLEST/HDLC SERPL: 2.6 {RATIO}
CO2 SERPL-SCNC: 30 MMOL/L (ref 21–32)
CREAT SERPL-MCNC: 0.92 MG/DL (ref 0.55–1.02)
CREAT UR-MCNC: 68 MG/DL (ref 28–219)
DIFFERENTIAL METHOD BLD: ABNORMAL
EGFR (NO RACE VARIABLE) (RUSH/TITUS): 69 ML/MIN/1.73M²
EOSINOPHIL # BLD AUTO: 0.28 K/UL (ref 0–0.5)
EOSINOPHIL NFR BLD AUTO: 4.1 % (ref 1–4)
ERYTHROCYTE [DISTWIDTH] IN BLOOD BY AUTOMATED COUNT: 12.4 % (ref 11.5–14.5)
EST. AVERAGE GLUCOSE BLD GHB EST-MCNC: 134 MG/DL
GLOBULIN SER-MCNC: 4.2 G/DL (ref 2–4)
GLUCOSE SERPL-MCNC: 138 MG/DL (ref 74–106)
HBA1C MFR BLD HPLC: 6.6 % (ref 4.5–6.6)
HCT VFR BLD AUTO: 36.5 % (ref 38–47)
HDLC SERPL-MCNC: 65 MG/DL (ref 40–60)
HGB BLD-MCNC: 11.3 G/DL (ref 12–16)
IMM GRANULOCYTES # BLD AUTO: 0.02 K/UL (ref 0–0.04)
IMM GRANULOCYTES NFR BLD: 0.3 % (ref 0–0.4)
LDLC SERPL CALC-MCNC: 85 MG/DL
LDLC/HDLC SERPL: 1.3 {RATIO}
LYMPHOCYTES # BLD AUTO: 2.19 K/UL (ref 1–4.8)
LYMPHOCYTES NFR BLD AUTO: 32 % (ref 27–41)
MCH RBC QN AUTO: 29.9 PG (ref 27–31)
MCHC RBC AUTO-ENTMCNC: 31 G/DL (ref 32–36)
MCV RBC AUTO: 96.6 FL (ref 80–96)
MICROALBUMIN UR-MCNC: 3.5 MG/DL (ref 0–2.8)
MICROALBUMIN/CREAT RATIO PNL UR: 51.5 MG/G (ref 0–30)
MONOCYTES # BLD AUTO: 0.44 K/UL (ref 0–0.8)
MONOCYTES NFR BLD AUTO: 6.4 % (ref 2–6)
MPC BLD CALC-MCNC: 11.6 FL (ref 9.4–12.4)
NEUTROPHILS # BLD AUTO: 3.89 K/UL (ref 1.8–7.7)
NEUTROPHILS NFR BLD AUTO: 56.8 % (ref 53–65)
NONHDLC SERPL-MCNC: 104 MG/DL
NRBC # BLD AUTO: 0 X10E3/UL
NRBC, AUTO (.00): 0 %
PLATELET # BLD AUTO: 260 K/UL (ref 150–400)
POTASSIUM SERPL-SCNC: 5 MMOL/L (ref 3.5–5.1)
PROT SERPL-MCNC: 7.6 G/DL (ref 6.4–8.2)
RBC # BLD AUTO: 3.78 M/UL (ref 4.2–5.4)
SODIUM SERPL-SCNC: 137 MMOL/L (ref 136–145)
TRIGL SERPL-MCNC: 95 MG/DL (ref 35–150)
VLDLC SERPL-MCNC: 19 MG/DL
WBC # BLD AUTO: 6.85 K/UL (ref 4.5–11)

## 2022-09-27 PROCEDURE — G0008 ADMIN INFLUENZA VIRUS VAC: HCPCS | Mod: ,,, | Performed by: FAMILY MEDICINE

## 2022-09-27 PROCEDURE — 3061F NEG MICROALBUMINURIA REV: CPT | Mod: ,,, | Performed by: FAMILY MEDICINE

## 2022-09-27 PROCEDURE — 4010F ACE/ARB THERAPY RXD/TAKEN: CPT | Mod: ,,, | Performed by: FAMILY MEDICINE

## 2022-09-27 PROCEDURE — 80061 LIPID PANEL: CPT | Mod: ,,, | Performed by: CLINICAL MEDICAL LABORATORY

## 2022-09-27 PROCEDURE — 99214 OFFICE O/P EST MOD 30 MIN: CPT | Mod: ,,, | Performed by: FAMILY MEDICINE

## 2022-09-27 PROCEDURE — 85025 CBC WITH DIFFERENTIAL: ICD-10-PCS | Mod: ,,, | Performed by: CLINICAL MEDICAL LABORATORY

## 2022-09-27 PROCEDURE — 80061 LIPID PANEL: ICD-10-PCS | Mod: ,,, | Performed by: CLINICAL MEDICAL LABORATORY

## 2022-09-27 PROCEDURE — 3077F SYST BP >= 140 MM HG: CPT | Mod: ,,, | Performed by: FAMILY MEDICINE

## 2022-09-27 PROCEDURE — 90677 PCV20 VACCINE IM: CPT | Mod: ,,, | Performed by: FAMILY MEDICINE

## 2022-09-27 PROCEDURE — 90694 VACC AIIV4 NO PRSRV 0.5ML IM: CPT | Mod: ,,, | Performed by: FAMILY MEDICINE

## 2022-09-27 PROCEDURE — 1160F PR REVIEW ALL MEDS BY PRESCRIBER/CLIN PHARMACIST DOCUMENTED: ICD-10-PCS | Mod: ,,, | Performed by: FAMILY MEDICINE

## 2022-09-27 PROCEDURE — 80053 COMPREHENSIVE METABOLIC PANEL: ICD-10-PCS | Mod: ,,, | Performed by: CLINICAL MEDICAL LABORATORY

## 2022-09-27 PROCEDURE — 1159F MED LIST DOCD IN RCRD: CPT | Mod: ,,, | Performed by: FAMILY MEDICINE

## 2022-09-27 PROCEDURE — 85025 COMPLETE CBC W/AUTO DIFF WBC: CPT | Mod: ,,, | Performed by: CLINICAL MEDICAL LABORATORY

## 2022-09-27 PROCEDURE — 3288F FALL RISK ASSESSMENT DOCD: CPT | Mod: ,,, | Performed by: FAMILY MEDICINE

## 2022-09-27 PROCEDURE — 3077F PR MOST RECENT SYSTOLIC BLOOD PRESSURE >= 140 MM HG: ICD-10-PCS | Mod: ,,, | Performed by: FAMILY MEDICINE

## 2022-09-27 PROCEDURE — 82570 MICROALBUMIN / CREATININE RATIO URINE: ICD-10-PCS | Mod: ,,, | Performed by: CLINICAL MEDICAL LABORATORY

## 2022-09-27 PROCEDURE — 82043 MICROALBUMIN / CREATININE RATIO URINE: ICD-10-PCS | Mod: ,,, | Performed by: CLINICAL MEDICAL LABORATORY

## 2022-09-27 PROCEDURE — 3008F BODY MASS INDEX DOCD: CPT | Mod: ,,, | Performed by: FAMILY MEDICINE

## 2022-09-27 PROCEDURE — 83036 HEMOGLOBIN GLYCOSYLATED A1C: CPT | Mod: ,,, | Performed by: CLINICAL MEDICAL LABORATORY

## 2022-09-27 PROCEDURE — G0009 ADMIN PNEUMOCOCCAL VACCINE: HCPCS | Mod: ,,, | Performed by: FAMILY MEDICINE

## 2022-09-27 PROCEDURE — 3079F PR MOST RECENT DIASTOLIC BLOOD PRESSURE 80-89 MM HG: ICD-10-PCS | Mod: ,,, | Performed by: FAMILY MEDICINE

## 2022-09-27 PROCEDURE — G0008 FLU VACCINE - QUADRIVALENT - ADJUVANTED: ICD-10-PCS | Mod: ,,, | Performed by: FAMILY MEDICINE

## 2022-09-27 PROCEDURE — 4010F PR ACE/ARB THEARPY RXD/TAKEN: ICD-10-PCS | Mod: ,,, | Performed by: FAMILY MEDICINE

## 2022-09-27 PROCEDURE — 99214 PR OFFICE/OUTPT VISIT, EST, LEVL IV, 30-39 MIN: ICD-10-PCS | Mod: ,,, | Performed by: FAMILY MEDICINE

## 2022-09-27 PROCEDURE — 80053 COMPREHEN METABOLIC PANEL: CPT | Mod: ,,, | Performed by: CLINICAL MEDICAL LABORATORY

## 2022-09-27 PROCEDURE — 82570 ASSAY OF URINE CREATININE: CPT | Mod: ,,, | Performed by: CLINICAL MEDICAL LABORATORY

## 2022-09-27 PROCEDURE — 3008F PR BODY MASS INDEX (BMI) DOCUMENTED: ICD-10-PCS | Mod: ,,, | Performed by: FAMILY MEDICINE

## 2022-09-27 PROCEDURE — 1159F PR MEDICATION LIST DOCUMENTED IN MEDICAL RECORD: ICD-10-PCS | Mod: ,,, | Performed by: FAMILY MEDICINE

## 2022-09-27 PROCEDURE — 3288F PR FALLS RISK ASSESSMENT DOCUMENTED: ICD-10-PCS | Mod: ,,, | Performed by: FAMILY MEDICINE

## 2022-09-27 PROCEDURE — 3051F HG A1C>EQUAL 7.0%<8.0%: CPT | Mod: ,,, | Performed by: FAMILY MEDICINE

## 2022-09-27 PROCEDURE — 82043 UR ALBUMIN QUANTITATIVE: CPT | Mod: ,,, | Performed by: CLINICAL MEDICAL LABORATORY

## 2022-09-27 PROCEDURE — 1101F PR PT FALLS ASSESS DOC 0-1 FALLS W/OUT INJ PAST YR: ICD-10-PCS | Mod: ,,, | Performed by: FAMILY MEDICINE

## 2022-09-27 PROCEDURE — 3051F PR MOST RECENT HEMOGLOBIN A1C LEVEL 7.0 - < 8.0%: ICD-10-PCS | Mod: ,,, | Performed by: FAMILY MEDICINE

## 2022-09-27 PROCEDURE — 3061F PR NEG MICROALBUMINURIA RESULT DOCUMENTED/REVIEW: ICD-10-PCS | Mod: ,,, | Performed by: FAMILY MEDICINE

## 2022-09-27 PROCEDURE — 90694 FLU VACCINE - QUADRIVALENT - ADJUVANTED: ICD-10-PCS | Mod: ,,, | Performed by: FAMILY MEDICINE

## 2022-09-27 PROCEDURE — 1101F PT FALLS ASSESS-DOCD LE1/YR: CPT | Mod: ,,, | Performed by: FAMILY MEDICINE

## 2022-09-27 PROCEDURE — 83036 HEMOGLOBIN A1C: ICD-10-PCS | Mod: ,,, | Performed by: CLINICAL MEDICAL LABORATORY

## 2022-09-27 PROCEDURE — 3079F DIAST BP 80-89 MM HG: CPT | Mod: ,,, | Performed by: FAMILY MEDICINE

## 2022-09-27 PROCEDURE — 1160F RVW MEDS BY RX/DR IN RCRD: CPT | Mod: ,,, | Performed by: FAMILY MEDICINE

## 2022-09-27 PROCEDURE — 3066F NEPHROPATHY DOC TX: CPT | Mod: ,,, | Performed by: FAMILY MEDICINE

## 2022-09-27 PROCEDURE — 3066F PR DOCUMENTATION OF TREATMENT FOR NEPHROPATHY: ICD-10-PCS | Mod: ,,, | Performed by: FAMILY MEDICINE

## 2022-09-27 PROCEDURE — G0009 PNEUMOCOCCAL CONJUGATE VACCINE 20-VALENT: ICD-10-PCS | Mod: ,,, | Performed by: FAMILY MEDICINE

## 2022-09-27 PROCEDURE — 90677 PNEUMOCOCCAL CONJUGATE VACCINE 20-VALENT: ICD-10-PCS | Mod: ,,, | Performed by: FAMILY MEDICINE

## 2022-09-27 RX ORDER — SITAGLIPTIN AND METFORMIN HYDROCHLORIDE 1000; 100 MG/1; MG/1
1 TABLET, FILM COATED, EXTENDED RELEASE ORAL DAILY
Qty: 90 TABLET | Refills: 1 | Status: SHIPPED | OUTPATIENT
Start: 2022-09-27 | End: 2023-01-19 | Stop reason: SDUPTHER

## 2022-09-27 RX ORDER — MELOXICAM 7.5 MG/1
7.5 TABLET ORAL DAILY
Qty: 90 TABLET | Refills: 1 | Status: SHIPPED | OUTPATIENT
Start: 2022-09-27 | End: 2023-03-26

## 2022-09-27 RX ORDER — CITALOPRAM 20 MG/1
20 TABLET, FILM COATED ORAL DAILY
Qty: 30 TABLET | Refills: 1 | Status: SHIPPED | OUTPATIENT
Start: 2022-09-27 | End: 2023-01-19 | Stop reason: SDUPTHER

## 2022-09-27 RX ORDER — LISINOPRIL 40 MG/1
40 TABLET ORAL DAILY
Qty: 90 TABLET | Refills: 1 | Status: SHIPPED | OUTPATIENT
Start: 2022-09-27 | End: 2023-01-19 | Stop reason: SDUPTHER

## 2022-09-27 RX ORDER — CETIRIZINE HYDROCHLORIDE 10 MG/1
10 TABLET ORAL DAILY
Qty: 90 TABLET | Refills: 1 | Status: SHIPPED | OUTPATIENT
Start: 2022-09-27 | End: 2023-04-04 | Stop reason: SDUPTHER

## 2022-09-27 RX ORDER — SIMVASTATIN 40 MG/1
40 TABLET, FILM COATED ORAL NIGHTLY
Qty: 90 TABLET | Refills: 1 | Status: SHIPPED | OUTPATIENT
Start: 2022-09-27 | End: 2023-04-04 | Stop reason: SDUPTHER

## 2022-09-27 RX ORDER — HYDROCHLOROTHIAZIDE 12.5 MG/1
12.5-25 TABLET ORAL DAILY
Qty: 180 TABLET | Refills: 1 | Status: SHIPPED | OUTPATIENT
Start: 2022-09-27 | End: 2023-01-19 | Stop reason: SDUPTHER

## 2022-09-27 RX ORDER — BROMPHENIRAMINE MALEATE, DEXTROMETHORPHAN HBR, PHENYLEPHRINE HCL, DIPHENHYDRAMINE HCL, PHENYLEPHRINE HCL 0.52G
0.52 KIT ORAL 2 TIMES DAILY
Qty: 180 CAPSULE | Refills: 1 | Status: SHIPPED | OUTPATIENT
Start: 2022-09-27 | End: 2023-01-19 | Stop reason: SDUPTHER

## 2022-09-27 RX ORDER — FLUTICASONE PROPIONATE 50 MCG
1 SPRAY, SUSPENSION (ML) NASAL DAILY
COMMUNITY

## 2022-09-27 RX ORDER — FUROSEMIDE 40 MG/1
40 TABLET ORAL DAILY
Qty: 90 TABLET | Refills: 1 | Status: SHIPPED | OUTPATIENT
Start: 2022-09-27 | End: 2023-01-19 | Stop reason: SDUPTHER

## 2022-09-27 NOTE — PROGRESS NOTES
Josselin Denise MD        PATIENT NAME: Saray Jorgensen  : 1955  DATE: 22  MRN: [id number]      Billing Provider: Josselin Denise MD  Level of Service:   Patient PCP Information       Provider PCP Type    Josselin Denise MD General            Reason for Visit / Chief Complaint: Follow-up (Patient state that she's feeling good and not having any pain)       History of Present Illness      Saray Jorgensen presents to the clinic with Follow-up (Patient state that she's feeling good and not having any pain)     Her abd pain is doing better  Her sugars are stable  She has been adherent with a better diet with high protein no fried food    BP improves at home    She is overall doing better than last time she was here, he pain is now gone, she does need some medications refills and some labs done     Follow-up  Pertinent negatives include no fatigue or fever.     Review of Systems     Review of Systems   Constitutional:  Negative for activity change, appetite change, fatigue and fever.   Respiratory:  Negative for shortness of breath.    Allergic/Immunologic: Positive for environmental allergies.   Psychiatric/Behavioral:  Negative for agitation, behavioral problems and suicidal ideas.      Medical / Social / Family History     Past Medical History:   Diagnosis Date    Colon cancer     Diabetes     Diabetes mellitus, type 2     Gallstones     History of colonoscopy 2014    Hyperlipidemia     Hypertension        Past Surgical History:   Procedure Laterality Date    COLECTOMY, RIGHT         Social History  Ms.  reports that she has never smoked. She has never used smokeless tobacco. She reports that she does not drink alcohol and does not use drugs.    Family History  Ms.'s family history includes Diabetes in her brother, mother, and sister; Hypertension in her brother, mother, and sister; Prostate cancer in her father.    Medications and Allergies     Medications  Outpatient Medications Marked as Taking for  the 9/27/22 encounter (Office Visit) with Josselin Denise MD   Medication Sig Dispense Refill    ascorbic acid-multivit-min (VITAMIN C ENERGY BOOSTER) 1,000 mg PwEP Take 1 packet by mouth once daily.      ascorbic acid-multivit-min (VITAMIN C FIZZY DRINK) 1,000 mg PwEP Take 1 packet by mouth once daily.      aspirin 325 MG tablet Take 1 tablet (325 mg total) by mouth once daily. 90 tablet 2    calcium carbonate/vitamin D3 (CALCIUM 600 WITH VITAMIN D3 ORAL) Take 1 tablet by mouth once daily.      fluticasone propionate (FLONASE) 50 mcg/actuation nasal spray 1 spray by Each Nostril route once daily.      GARLIC ORAL Take 1 tablet by mouth once daily.      multivitamin (THERAGRAN) per tablet Take 1 tablet by mouth once daily.      olopatadine (PATADAY) 0.2 % Drop       [DISCONTINUED] cetirizine (ZYRTEC) 10 MG tablet Take 1 tablet (10 mg total) by mouth once daily. 90 tablet 1    [DISCONTINUED] citalopram (CELEXA) 20 MG tablet Take 1 tablet (20 mg total) by mouth once daily. 30 tablet 1    [DISCONTINUED] furosemide (LASIX) 40 MG tablet TAKE ONE TABLET BY MOUTH EVERY DAY 30 tablet 1    [DISCONTINUED] hydroCHLOROthiazide (HYDRODIURIL) 12.5 MG Tab Take 1-2 tablets (12.5-25 mg total) by mouth once daily. 180 tablet 1    [DISCONTINUED] JANUMET -1,000 mg TM24 Take 1 tablet by mouth once daily. 90 tablet 1    [DISCONTINUED] lisinopriL (PRINIVIL,ZESTRIL) 40 MG tablet Take 1 tablet (40 mg total) by mouth once daily. 90 tablet 1    [DISCONTINUED] meloxicam (MOBIC) 7.5 MG tablet TAKE 1 TO 2 TABLETS BY MOUTH EVERY DAY 90 tablet 1    [DISCONTINUED] psyllium 0.52 gram capsule Take 0.52 g by mouth 2 (two) times a day.      [DISCONTINUED] simvastatin (ZOCOR) 40 MG tablet TAKE ONE TABLET BY MOUTH NIGHTLY AT BEDTIME 30 tablet 1       Allergies  Review of patient's allergies indicates:  No Known Allergies    Physical Examination   BP (!) 148/86 (BP Location: Left arm, Patient Position: Sitting, BP Method: Medium (Automatic))   Pulse  "67   Resp 20   Ht 5' 10" (1.778 m)   Wt 136.1 kg (300 lb)   SpO2 99%   BMI 43.05 kg/m²     Physical Exam  Constitutional:       Appearance: Normal appearance. She is obese.   Cardiovascular:      Rate and Rhythm: Normal rate and regular rhythm.      Pulses: Normal pulses.      Heart sounds: Normal heart sounds.   Pulmonary:      Effort: Pulmonary effort is normal.      Breath sounds: Normal breath sounds.   Musculoskeletal:         General: Normal range of motion.   Skin:     General: Skin is warm.   Neurological:      General: No focal deficit present.      Mental Status: She is alert.   Psychiatric:         Mood and Affect: Mood normal.         Behavior: Behavior normal.         Judgment: Judgment normal.          Assessment and Plan (including Health Maintenance)     Plan:         Problem List Items Addressed This Visit          ENT    Seasonal allergic rhinitis due to pollen    Relevant Medications    cetirizine (ZYRTEC) 10 MG tablet       Cardiac/Vascular    Essential hypertension    Relevant Medications    furosemide (LASIX) 40 MG tablet    hydroCHLOROthiazide (HYDRODIURIL) 12.5 MG Tab    lisinopriL (PRINIVIL,ZESTRIL) 40 MG tablet       Endocrine    Type 2 diabetes mellitus without complication, without long-term current use of insulin    Relevant Medications    JANUMET -1,000 mg TM24    Other Relevant Orders    Comprehensive Metabolic Panel    Lipid Panel    Microalbumin/Creatinine Ratio, Urine    Hemoglobin A1C    CBC Auto Differential     Other Visit Diagnoses       Need for vaccination    -  Primary    Relevant Orders    Influenza (FLUAD) - Quadrivalent (Adjuvanted) *Preferred* (65+) (PF) (Completed)    (In Office Administered) Pneumococcal Conjugate Vaccine (20 Valent) (IM) (Completed)    Recurrent major depressive disorder, in full remission        Relevant Medications    citalopram (CELEXA) 20 MG tablet    Hyperlipidemia, unspecified hyperlipidemia type        Relevant Medications    " simvastatin (ZOCOR) 40 MG tablet    Class 3 severe obesity with body mass index (BMI) of 40.0 to 44.9 in adult, unspecified obesity type, unspecified whether serious comorbidity present              - for allergies will due Flonase on top of cetirizine  - work on diet, specifically cutting back bread, breaded things, cereal, pasta, potatoes, rice  - try to exercise at least 150min a week divided however you want  - if you can do weight, even very light weight do them  - keep an eye on sugars, fasting sugar goal , after eating no greater than 180  - A1C goal is around 7.0%  - continue current regiment and please note if any changes were made      Follow up in about 6 months (around 3/27/2023).        Signature:  Josselin Denise MD      Date of encounter: 9/27/22

## 2022-10-14 NOTE — PROCEDURES
Procedures  Ultrasound note:    Uterus 9.17 x 6.02 x 5.97 cm   Endometrial thickness 1.93 cm     Impression:  Uterus sent endometrial heterogeneous texture with no free fluid in the adnexa  Mid uterine segment hypoechoic area seen 1.1 x 1.0 x 1 and 1.1 x 1.0 cm   Ovaries not seen due to overlying bowel gas

## 2022-11-02 ENCOUNTER — HOSPITAL ENCOUNTER (OUTPATIENT)
Dept: RADIOLOGY | Facility: HOSPITAL | Age: 67
Discharge: HOME | End: 2022-11-02
Payer: COMMERCIAL

## 2022-11-02 VITALS — HEIGHT: 70 IN | WEIGHT: 293 LBS | BODY MASS INDEX: 41.95 KG/M2

## 2022-11-02 DIAGNOSIS — Z12.31 BREAST CANCER SCREENING BY MAMMOGRAM: ICD-10-CM

## 2022-11-02 PROCEDURE — 77067 SCR MAMMO BI INCL CAD: CPT | Mod: TC

## 2022-11-03 ENCOUNTER — PROCEDURE VISIT (OUTPATIENT)
Dept: OBSTETRICS AND GYNECOLOGY | Facility: CLINIC | Age: 67
End: 2022-11-03
Payer: COMMERCIAL

## 2022-11-03 VITALS
SYSTOLIC BLOOD PRESSURE: 155 MMHG | BODY MASS INDEX: 43.07 KG/M2 | HEART RATE: 79 BPM | WEIGHT: 293 LBS | DIASTOLIC BLOOD PRESSURE: 70 MMHG

## 2022-11-03 DIAGNOSIS — L29.2 VULVAR ITCHING: ICD-10-CM

## 2022-11-03 DIAGNOSIS — N90.4 LICHEN SCLEROSUS OF FEMALE GENITALIA: ICD-10-CM

## 2022-11-03 DIAGNOSIS — R93.89 THICKENED ENDOMETRIUM: Primary | ICD-10-CM

## 2022-11-03 LAB
B-HCG UR QL: NEGATIVE
CTP QC/QA: YES

## 2022-11-03 PROCEDURE — 58558 PR HYSTEROSCOPY,W/ENDO BX: ICD-10-PCS | Mod: ,,, | Performed by: OBSTETRICS & GYNECOLOGY

## 2022-11-03 PROCEDURE — 88305 TISSUE EXAM BY PATHOLOGIST: CPT | Mod: 26,,, | Performed by: PATHOLOGY

## 2022-11-03 PROCEDURE — 99499 NO LOS: ICD-10-PCS | Mod: ,,, | Performed by: OBSTETRICS & GYNECOLOGY

## 2022-11-03 PROCEDURE — 81025 POCT URINE PREGNANCY: ICD-10-PCS | Mod: ,,, | Performed by: OBSTETRICS & GYNECOLOGY

## 2022-11-03 PROCEDURE — 99499 UNLISTED E&M SERVICE: CPT | Mod: ,,, | Performed by: OBSTETRICS & GYNECOLOGY

## 2022-11-03 PROCEDURE — 58558 HYSTEROSCOPY BIOPSY: CPT | Mod: ,,, | Performed by: OBSTETRICS & GYNECOLOGY

## 2022-11-03 PROCEDURE — 88305 TISSUE EXAM BY PATHOLOGIST: CPT | Mod: SUR | Performed by: OBSTETRICS & GYNECOLOGY

## 2022-11-03 PROCEDURE — 81025 URINE PREGNANCY TEST: CPT | Mod: ,,, | Performed by: OBSTETRICS & GYNECOLOGY

## 2022-11-03 PROCEDURE — 88305 SURGICAL PATHOLOGY: ICD-10-PCS | Mod: 26,,, | Performed by: PATHOLOGY

## 2022-11-03 RX ORDER — CLOBETASOL PROPIONATE 0.5 MG/G
OINTMENT TOPICAL 2 TIMES DAILY
Qty: 30 G | Refills: 1 | Status: SHIPPED | OUTPATIENT
Start: 2022-11-03 | End: 2023-10-05

## 2022-11-03 NOTE — PROCEDURES
Procedures  Hysteorscopy, EMB Procedure Note:    Following informed written consent, the pt was placed in the lithotomy position. The speculum was inserted into the vagina and the cervix visualized with ease. The cervix was cleaned with betadine.  The uterus was sounded to 7 cm with the uterine sound. The hysteroscope was then inserted into the cervical os and the uterine cavity was directly visualized, bilateral ostea were noted at that time. Findings thickened endometrium without polyps, fibroids or lesions seen. The hysteroscope was then removed and a biopsy of the endometrium performed with a pipelle. Specimen sent to pathology for evaluation. The single tooth tenaculum was then removed and the cervix was made hemostatic with pressure.

## 2022-11-07 LAB
ESTROGEN SERPL-MCNC: NORMAL PG/ML
INSULIN SERPL-ACNC: NORMAL U[IU]/ML
LAB AP CLINICAL INFORMATION: NORMAL
LAB AP GROSS DESCRIPTION: NORMAL
LAB AP LABORATORY NOTES: NORMAL
T3RU NFR SERPL: NORMAL %

## 2022-11-09 DIAGNOSIS — Z71.89 COMPLEX CARE COORDINATION: ICD-10-CM

## 2022-12-21 ENCOUNTER — OFFICE VISIT (OUTPATIENT)
Dept: OBSTETRICS AND GYNECOLOGY | Facility: CLINIC | Age: 67
End: 2022-12-21
Payer: COMMERCIAL

## 2022-12-21 VITALS
BODY MASS INDEX: 43.99 KG/M2 | DIASTOLIC BLOOD PRESSURE: 70 MMHG | HEART RATE: 74 BPM | WEIGHT: 293 LBS | SYSTOLIC BLOOD PRESSURE: 154 MMHG

## 2022-12-21 DIAGNOSIS — N85.01 SIMPLE ENDOMETRIAL HYPERPLASIA: Primary | ICD-10-CM

## 2022-12-21 PROCEDURE — 99214 OFFICE O/P EST MOD 30 MIN: CPT | Mod: ,,, | Performed by: OBSTETRICS & GYNECOLOGY

## 2022-12-21 PROCEDURE — 3044F PR MOST RECENT HEMOGLOBIN A1C LEVEL <7.0%: ICD-10-PCS | Mod: CPTII,,, | Performed by: OBSTETRICS & GYNECOLOGY

## 2022-12-21 PROCEDURE — 3078F PR MOST RECENT DIASTOLIC BLOOD PRESSURE < 80 MM HG: ICD-10-PCS | Mod: CPTII,,, | Performed by: OBSTETRICS & GYNECOLOGY

## 2022-12-21 PROCEDURE — 3078F DIAST BP <80 MM HG: CPT | Mod: CPTII,,, | Performed by: OBSTETRICS & GYNECOLOGY

## 2022-12-21 PROCEDURE — 1159F MED LIST DOCD IN RCRD: CPT | Mod: CPTII,,, | Performed by: OBSTETRICS & GYNECOLOGY

## 2022-12-21 PROCEDURE — 4010F ACE/ARB THERAPY RXD/TAKEN: CPT | Mod: CPTII,,, | Performed by: OBSTETRICS & GYNECOLOGY

## 2022-12-21 PROCEDURE — 3077F PR MOST RECENT SYSTOLIC BLOOD PRESSURE >= 140 MM HG: ICD-10-PCS | Mod: CPTII,,, | Performed by: OBSTETRICS & GYNECOLOGY

## 2022-12-21 PROCEDURE — 3008F BODY MASS INDEX DOCD: CPT | Mod: CPTII,,, | Performed by: OBSTETRICS & GYNECOLOGY

## 2022-12-21 PROCEDURE — 3066F NEPHROPATHY DOC TX: CPT | Mod: CPTII,,, | Performed by: OBSTETRICS & GYNECOLOGY

## 2022-12-21 PROCEDURE — 3044F HG A1C LEVEL LT 7.0%: CPT | Mod: CPTII,,, | Performed by: OBSTETRICS & GYNECOLOGY

## 2022-12-21 PROCEDURE — 99214 PR OFFICE/OUTPT VISIT, EST, LEVL IV, 30-39 MIN: ICD-10-PCS | Mod: ,,, | Performed by: OBSTETRICS & GYNECOLOGY

## 2022-12-21 PROCEDURE — 1159F PR MEDICATION LIST DOCUMENTED IN MEDICAL RECORD: ICD-10-PCS | Mod: CPTII,,, | Performed by: OBSTETRICS & GYNECOLOGY

## 2022-12-21 PROCEDURE — 3060F PR POS MICROALBUMINURIA RESULT DOCUMENTED/REVIEW: ICD-10-PCS | Mod: CPTII,,, | Performed by: OBSTETRICS & GYNECOLOGY

## 2022-12-21 PROCEDURE — 3077F SYST BP >= 140 MM HG: CPT | Mod: CPTII,,, | Performed by: OBSTETRICS & GYNECOLOGY

## 2022-12-21 PROCEDURE — 4010F PR ACE/ARB THEARPY RXD/TAKEN: ICD-10-PCS | Mod: CPTII,,, | Performed by: OBSTETRICS & GYNECOLOGY

## 2022-12-21 PROCEDURE — 3060F POS MICROALBUMINURIA REV: CPT | Mod: CPTII,,, | Performed by: OBSTETRICS & GYNECOLOGY

## 2022-12-21 PROCEDURE — 3008F PR BODY MASS INDEX (BMI) DOCUMENTED: ICD-10-PCS | Mod: CPTII,,, | Performed by: OBSTETRICS & GYNECOLOGY

## 2022-12-21 PROCEDURE — 3066F PR DOCUMENTATION OF TREATMENT FOR NEPHROPATHY: ICD-10-PCS | Mod: CPTII,,, | Performed by: OBSTETRICS & GYNECOLOGY

## 2022-12-21 RX ORDER — MEGESTROL ACETATE 40 MG/1
40 TABLET ORAL 4 TIMES DAILY
Qty: 120 TABLET | Refills: 2 | Status: SHIPPED | OUTPATIENT
Start: 2022-12-21 | End: 2023-04-04 | Stop reason: SDUPTHER

## 2022-12-21 NOTE — PROGRESS NOTES
Subjective:       Patient ID: Saray Jorgensen is a 67 y.o. female.    Chief Complaint:  Follow-up (2 wk f/u from 22)      History of Present Illness  HPI  Pt here to follow up from  EMB    GYN & OB History  No LMP recorded. Patient is postmenopausal.   Date of Last Pap: No result found    OB History    Para Term  AB Living   5 5 5         SAB IAB Ectopic Multiple Live Births                  # Outcome Date GA Lbr Franky/2nd Weight Sex Delivery Anes PTL Lv   5 Term            4 Term            3 Term            2 Term            1 Term                Review of Systems  Review of Systems   Constitutional:  Negative for activity change, appetite change, fatigue and unexpected weight change.   HENT: Negative.     Respiratory:  Negative for cough, shortness of breath and wheezing.    Cardiovascular:  Negative for chest pain, palpitations and leg swelling.   Gastrointestinal:  Negative for abdominal pain, bloating, blood in stool, constipation, diarrhea, nausea, vomiting and reflux.   Genitourinary:  Negative for bladder incontinence, decreased libido, dysmenorrhea, dyspareunia, dysuria, flank pain, frequency, hot flashes, menorrhagia, menstrual problem, pelvic pain, urgency, vaginal bleeding, vaginal discharge, urinary incontinence, postcoital bleeding, postmenopausal bleeding, vaginal dryness and vaginal odor.   Musculoskeletal:  Negative for back pain.   Integumentary:  Negative for rash, acne, hair changes, mole/lesion, breast mass, nipple discharge, breast skin changes and breast tenderness.   Neurological:  Negative for headaches.   Psychiatric/Behavioral:  Negative for depression and sleep disturbance. The patient is not nervous/anxious.    Breast: Negative for asymmetry, breast self exam, lump, mass, nipple discharge, skin changes and tenderness        Objective:    Physical Exam:   Constitutional: She is oriented to person, place, and time. She appears well-developed and well-nourished.    HENT:    Head: Normocephalic and atraumatic.    Eyes: Pupils are equal, round, and reactive to light. EOM are normal.     Cardiovascular:  Normal rate, regular rhythm and normal heart sounds.             Pulmonary/Chest: Effort normal and breath sounds normal.        Abdominal: Soft. Bowel sounds are normal.             Musculoskeletal: Normal range of motion and moves all extremeties.       Neurological: She is alert and oriented to person, place, and time. She has normal reflexes.     Psychiatric: She has a normal mood and affect. Her behavior is normal. Judgment and thought content normal.        Assessment:        1. Simple endometrial hyperplasia       Reviewed Pathology results with pt:  [facility]IL[facility]. Endometrium, biopsy:  Simple endometrial hyperplasia involving the lower uterine segment      Pt wishes to try medication and repeat EMB in 3 months.        Plan:      Rx given for Megace with plans for repeat EMB in 3 months.

## 2023-01-19 DIAGNOSIS — F33.42 RECURRENT MAJOR DEPRESSIVE DISORDER, IN FULL REMISSION: ICD-10-CM

## 2023-01-19 DIAGNOSIS — I10 ESSENTIAL HYPERTENSION: ICD-10-CM

## 2023-01-19 DIAGNOSIS — E11.9 TYPE 2 DIABETES MELLITUS WITHOUT COMPLICATION, WITHOUT LONG-TERM CURRENT USE OF INSULIN: ICD-10-CM

## 2023-01-19 DIAGNOSIS — J30.1 SEASONAL ALLERGIC RHINITIS DUE TO POLLEN: ICD-10-CM

## 2023-01-19 RX ORDER — HYDROCHLOROTHIAZIDE 12.5 MG/1
12.5-25 TABLET ORAL DAILY
Qty: 180 TABLET | Refills: 1 | Status: SHIPPED | OUTPATIENT
Start: 2023-01-19 | End: 2023-04-04 | Stop reason: SDUPTHER

## 2023-01-19 RX ORDER — FUROSEMIDE 40 MG/1
40 TABLET ORAL DAILY
Qty: 90 TABLET | Refills: 1 | Status: SHIPPED | OUTPATIENT
Start: 2023-01-19 | End: 2023-04-04 | Stop reason: SDUPTHER

## 2023-01-19 RX ORDER — CITALOPRAM 20 MG/1
20 TABLET, FILM COATED ORAL DAILY
Qty: 90 TABLET | Refills: 1 | Status: SHIPPED | OUTPATIENT
Start: 2023-01-19 | End: 2023-04-04 | Stop reason: SDUPTHER

## 2023-01-19 RX ORDER — SITAGLIPTIN AND METFORMIN HYDROCHLORIDE 1000; 100 MG/1; MG/1
1 TABLET, FILM COATED, EXTENDED RELEASE ORAL DAILY
Qty: 90 TABLET | Refills: 1 | Status: SHIPPED | OUTPATIENT
Start: 2023-01-19 | End: 2023-04-04 | Stop reason: SDUPTHER

## 2023-01-19 RX ORDER — LISINOPRIL 40 MG/1
40 TABLET ORAL DAILY
Qty: 90 TABLET | Refills: 1 | Status: SHIPPED | OUTPATIENT
Start: 2023-01-19 | End: 2023-04-04 | Stop reason: SDUPTHER

## 2023-01-19 RX ORDER — MEGESTROL ACETATE 40 MG/1
40 TABLET ORAL 4 TIMES DAILY
Qty: 360 TABLET | Refills: 1 | OUTPATIENT
Start: 2023-01-19 | End: 2024-01-19

## 2023-01-19 RX ORDER — BROMPHENIRAMINE MALEATE, DEXTROMETHORPHAN HBR, PHENYLEPHRINE HCL, DIPHENHYDRAMINE HCL, PHENYLEPHRINE HCL 0.52G
0.52 KIT ORAL 2 TIMES DAILY
Qty: 180 CAPSULE | Refills: 1 | Status: SHIPPED | OUTPATIENT
Start: 2023-01-19 | End: 2023-07-18

## 2023-03-17 LAB
LEFT EYE DM RETINOPATHY: NORMAL
RIGHT EYE DM RETINOPATHY: NORMAL

## 2023-03-21 ENCOUNTER — PROCEDURE VISIT (OUTPATIENT)
Dept: OBSTETRICS AND GYNECOLOGY | Facility: CLINIC | Age: 68
End: 2023-03-21
Payer: MEDICARE

## 2023-03-21 VITALS
SYSTOLIC BLOOD PRESSURE: 156 MMHG | HEART RATE: 102 BPM | WEIGHT: 293 LBS | DIASTOLIC BLOOD PRESSURE: 78 MMHG | BODY MASS INDEX: 42.9 KG/M2

## 2023-03-21 DIAGNOSIS — N85.01 SIMPLE ENDOMETRIAL HYPERPLASIA: Primary | ICD-10-CM

## 2023-03-21 PROCEDURE — 58100 ENDOMETRIAL BIOPSY: ICD-10-PCS | Mod: ,,, | Performed by: OBSTETRICS & GYNECOLOGY

## 2023-03-21 PROCEDURE — 88342 SURGICAL PATHOLOGY: ICD-10-PCS | Mod: 26,,, | Performed by: PATHOLOGY

## 2023-03-21 PROCEDURE — 88305 TISSUE EXAM BY PATHOLOGIST: CPT | Mod: TC,SUR | Performed by: OBSTETRICS & GYNECOLOGY

## 2023-03-21 PROCEDURE — 99499 UNLISTED E&M SERVICE: CPT | Mod: ,,, | Performed by: OBSTETRICS & GYNECOLOGY

## 2023-03-21 PROCEDURE — 88341 SURGICAL PATHOLOGY: ICD-10-PCS | Mod: 26,,, | Performed by: PATHOLOGY

## 2023-03-21 PROCEDURE — 99499 NO LOS: ICD-10-PCS | Mod: ,,, | Performed by: OBSTETRICS & GYNECOLOGY

## 2023-03-21 PROCEDURE — 88342 IMHCHEM/IMCYTCHM 1ST ANTB: CPT | Mod: 26,,, | Performed by: PATHOLOGY

## 2023-03-21 PROCEDURE — 58100 BIOPSY OF UTERUS LINING: CPT | Mod: ,,, | Performed by: OBSTETRICS & GYNECOLOGY

## 2023-03-21 PROCEDURE — 88341 IMHCHEM/IMCYTCHM EA ADD ANTB: CPT | Mod: 26,,, | Performed by: PATHOLOGY

## 2023-03-21 PROCEDURE — 88305 TISSUE EXAM BY PATHOLOGIST: CPT | Mod: 26,,, | Performed by: PATHOLOGY

## 2023-03-21 PROCEDURE — 88305 SURGICAL PATHOLOGY: ICD-10-PCS | Mod: 26,,, | Performed by: PATHOLOGY

## 2023-03-21 RX ORDER — ERGOCALCIFEROL 1.25 MG/1
50000 CAPSULE ORAL
Qty: 4 CAPSULE | Refills: 11 | Status: SHIPPED | OUTPATIENT
Start: 2023-03-21 | End: 2023-04-04 | Stop reason: SDUPTHER

## 2023-03-21 NOTE — PROCEDURES
Endometrial biopsy    Date/Time: 3/21/2023 10:15 AM  Performed by: Niyah King MD  Authorized by: Niyah King MD     Consent:     Consent obtained:  Written    Consent given by:  Patient    Patient questions answered: yes      Patient agrees, verbalizes understanding, and wants to proceed: yes      Educational handouts given: yes      Instructions and paperwork completed: yes    Indication:     Indications: Post-menopausal bleeding      Chronicity of post-menopausal bleeding:  Chronic    Progression of post-menopausal bleeding:  Improving  Procedure:     Procedure: endometrial biopsy with Pipelle      Cervix cleaned and prepped: yes      A paracervical block was performed: no      An intracervical block was performed: no      The cervix was dilated: no      Uterus sounded: yes      Uterus sound depth (cm):  7    Specimen collected: specimen collected and sent to pathology      Patient tolerated procedure well with no complications: yes

## 2023-03-27 LAB
DHEA SERPL-MCNC: NORMAL
ESTROGEN SERPL-MCNC: NORMAL PG/ML
INSULIN SERPL-ACNC: NORMAL U[IU]/ML
LAB AP CLINICAL INFORMATION: NORMAL
LAB AP GROSS DESCRIPTION: NORMAL
LAB AP LAB DEVELOPED DISCLOSURE: NORMAL
LAB AP LABORATORY NOTES: NORMAL
T3RU NFR SERPL: NORMAL %

## 2023-04-04 ENCOUNTER — OFFICE VISIT (OUTPATIENT)
Dept: FAMILY MEDICINE | Facility: CLINIC | Age: 68
End: 2023-04-04
Payer: MEDICARE

## 2023-04-04 VITALS
BODY MASS INDEX: 41.95 KG/M2 | HEIGHT: 70 IN | DIASTOLIC BLOOD PRESSURE: 84 MMHG | WEIGHT: 293 LBS | SYSTOLIC BLOOD PRESSURE: 134 MMHG | TEMPERATURE: 98 F | HEART RATE: 89 BPM | OXYGEN SATURATION: 97 % | RESPIRATION RATE: 18 BRPM

## 2023-04-04 DIAGNOSIS — F33.42 RECURRENT MAJOR DEPRESSIVE DISORDER, IN FULL REMISSION: ICD-10-CM

## 2023-04-04 DIAGNOSIS — J30.1 SEASONAL ALLERGIC RHINITIS DUE TO POLLEN: ICD-10-CM

## 2023-04-04 DIAGNOSIS — N83.201 CYST OF RIGHT OVARY: ICD-10-CM

## 2023-04-04 DIAGNOSIS — E11.9 TYPE 2 DIABETES MELLITUS WITHOUT COMPLICATION, WITHOUT LONG-TERM CURRENT USE OF INSULIN: ICD-10-CM

## 2023-04-04 DIAGNOSIS — E78.5 HYPERLIPIDEMIA, UNSPECIFIED HYPERLIPIDEMIA TYPE: ICD-10-CM

## 2023-04-04 DIAGNOSIS — E53.8 VITAMIN B 12 DEFICIENCY: ICD-10-CM

## 2023-04-04 DIAGNOSIS — I10 ESSENTIAL HYPERTENSION: Primary | ICD-10-CM

## 2023-04-04 PROCEDURE — 99214 OFFICE O/P EST MOD 30 MIN: CPT | Mod: ,,, | Performed by: NURSE PRACTITIONER

## 2023-04-04 PROCEDURE — 99214 PR OFFICE/OUTPT VISIT, EST, LEVL IV, 30-39 MIN: ICD-10-PCS | Mod: ,,, | Performed by: NURSE PRACTITIONER

## 2023-04-04 RX ORDER — SITAGLIPTIN AND METFORMIN HYDROCHLORIDE 1000; 100 MG/1; MG/1
1 TABLET, FILM COATED, EXTENDED RELEASE ORAL DAILY
Qty: 90 TABLET | Refills: 1 | Status: SHIPPED | OUTPATIENT
Start: 2023-04-04 | End: 2023-10-05 | Stop reason: SDUPTHER

## 2023-04-04 RX ORDER — MEGESTROL ACETATE 40 MG/1
40 TABLET ORAL 4 TIMES DAILY
Qty: 120 TABLET | Refills: 2 | Status: SHIPPED | OUTPATIENT
Start: 2023-04-04 | End: 2023-06-18

## 2023-04-04 RX ORDER — HYDROCHLOROTHIAZIDE 12.5 MG/1
12.5-25 TABLET ORAL DAILY
Qty: 180 TABLET | Refills: 1 | Status: SHIPPED | OUTPATIENT
Start: 2023-04-04 | End: 2023-10-05 | Stop reason: SDUPTHER

## 2023-04-04 RX ORDER — ERGOCALCIFEROL 1.25 MG/1
50000 CAPSULE ORAL
Qty: 4 CAPSULE | Refills: 11 | Status: SHIPPED | OUTPATIENT
Start: 2023-04-04 | End: 2024-01-24 | Stop reason: SDUPTHER

## 2023-04-04 RX ORDER — SIMVASTATIN 40 MG/1
40 TABLET, FILM COATED ORAL NIGHTLY
Qty: 90 TABLET | Refills: 1 | Status: SHIPPED | OUTPATIENT
Start: 2023-04-04 | End: 2023-10-05 | Stop reason: SDUPTHER

## 2023-04-04 RX ORDER — LISINOPRIL 40 MG/1
40 TABLET ORAL DAILY
Qty: 90 TABLET | Refills: 1 | Status: SHIPPED | OUTPATIENT
Start: 2023-04-04 | End: 2023-10-05 | Stop reason: SDUPTHER

## 2023-04-04 RX ORDER — FUROSEMIDE 40 MG/1
40 TABLET ORAL DAILY
Qty: 90 TABLET | Refills: 1 | Status: SHIPPED | OUTPATIENT
Start: 2023-04-04 | End: 2023-10-05 | Stop reason: SDUPTHER

## 2023-04-04 RX ORDER — CETIRIZINE HYDROCHLORIDE 10 MG/1
10 TABLET ORAL DAILY
Qty: 90 TABLET | Refills: 1 | Status: SHIPPED | OUTPATIENT
Start: 2023-04-04 | End: 2023-10-05 | Stop reason: SDUPTHER

## 2023-04-04 RX ORDER — CITALOPRAM 20 MG/1
20 TABLET, FILM COATED ORAL DAILY
Qty: 90 TABLET | Refills: 1 | Status: SHIPPED | OUTPATIENT
Start: 2023-04-04 | End: 2023-10-05 | Stop reason: SDUPTHER

## 2023-04-04 NOTE — ASSESSMENT & PLAN NOTE
Will refill megace  States she lost refill from Dr ahuja  She has appointment to follow up with gyn next week

## 2023-04-04 NOTE — PROGRESS NOTES
DELFINA Clark   [facility] [address] [place] [address]  [phone]      PATIENT NAME: Saray Jorgensen  : 1955  DATE: 23  MRN: [id number]      Billing Provider: DELFINA Clark  Level of Service:   Patient PCP Information       Provider PCP Type    DELFINA Clark General            Reason for Visit / Chief Complaint: Establish Care (Pt of Dr. Denise. /Wanting to make DELFINA Agustin primary. ) and Medication Refill       Update PCP  Update Chief Complaint         History of Present Illness / Problem Focused Workflow     67 year old female presents to establish care and needing medication refills  Denies any problems with current meds  Continue to follow up with gyn    Hx of DM, hypertension, hyperlipidemia, depression, b12 deficiency  Report glucose running between  at home with accu-check        Review of Systems     Review of Systems   Constitutional:  Negative for chills, fatigue and fever.   HENT:  Negative for congestion.    Respiratory:  Negative for cough and shortness of breath.    Cardiovascular:  Negative for chest pain and palpitations.   Gastrointestinal:  Negative for abdominal pain, diarrhea and nausea.   Endocrine: Negative for cold intolerance and heat intolerance.   Musculoskeletal:  Positive for arthralgias. Negative for gait problem.   Allergic/Immunologic: Positive for environmental allergies.   Neurological:  Negative for dizziness, weakness and headaches.   Psychiatric/Behavioral:  Negative for agitation and dysphoric mood.      Medical / Social / Family History     Past Medical History:   Diagnosis Date    Colon cancer     Diabetes     Diabetes mellitus, type 2     Gallstones     History of colonoscopy 2014    Hyperlipidemia     Hypertension        Past Surgical History:   Procedure Laterality Date    COLECTOMY, RIGHT         Social History  Ms.  reports that she has never smoked. She has  never been exposed to tobacco smoke. She has never used smokeless tobacco. She reports that she does not drink alcohol and does not use drugs.    Family History  Ms.'s family history includes Diabetes in her brother, mother, and sister; Hypertension in her brother, mother, and sister; Prostate cancer in her father.    Medications and Allergies     Medications  Outpatient Medications Marked as Taking for the 4/4/23 encounter (Office Visit) with DELFINA Clark   Medication Sig Dispense Refill    ascorbic acid-multivit-min (VITAMIN C FIZZY DRINK) 1,000 mg PwEP Take 1 packet by mouth once daily.      aspirin 325 MG tablet Take 1 tablet (325 mg total) by mouth once daily. 90 tablet 2    calcium carbonate/vitamin D3 (CALCIUM 600 WITH VITAMIN D3 ORAL) Take 1 tablet by mouth once daily.      clobetasol 0.05% (TEMOVATE) 0.05 % Oint Apply topically 2 (two) times daily. Apply twice daily for 2 weeks. Then once daily every other day 30 g 1    fluticasone propionate (FLONASE) 50 mcg/actuation nasal spray 1 spray by Each Nostril route once daily.      GARLIC ORAL Take 1 tablet by mouth once daily.      multivitamin (THERAGRAN) per tablet Take 1 tablet by mouth once daily.      olopatadine (PATADAY) 0.2 % Drop       psyllium 0.52 gram capsule Take 1 capsule (0.52 g total) by mouth 2 (two) times a day. 180 capsule 1    [DISCONTINUED] cetirizine (ZYRTEC) 10 MG tablet Take 1 tablet (10 mg total) by mouth once daily. 90 tablet 1    [DISCONTINUED] citalopram (CELEXA) 20 MG tablet Take 1 tablet (20 mg total) by mouth once daily. 90 tablet 1    [DISCONTINUED] ergocalciferol (ERGOCALCIFEROL) 50,000 unit Cap Take 1 capsule (50,000 Units total) by mouth every 7 days. 4 capsule 11    [DISCONTINUED] furosemide (LASIX) 40 MG tablet Take 1 tablet (40 mg total) by mouth once daily. 90 tablet 1    [DISCONTINUED] hydroCHLOROthiazide (HYDRODIURIL) 12.5 MG Tab Take 1-2 tablets (12.5-25 mg total) by mouth once daily. 180 tablet 1     [DISCONTINUED] JANUMET -1,000 mg TM24 Take 1 tablet by mouth once daily. 90 tablet 1    [DISCONTINUED] lisinopriL (PRINIVIL,ZESTRIL) 40 MG tablet Take 1 tablet (40 mg total) by mouth once daily. 90 tablet 1    [DISCONTINUED] megestroL (MEGACE) 40 MG Tab Take 1 tablet (40 mg total) by mouth 4 (four) times daily. 120 tablet 2    [DISCONTINUED] simvastatin (ZOCOR) 40 MG tablet Take 1 tablet (40 mg total) by mouth every evening. 90 tablet 1       Allergies  Review of patient's allergies indicates:  No Known Allergies    Physical Examination     Vitals:    04/04/23 1330   BP: 134/84   Pulse: 89   Resp: 18   Temp: 97.9 °F (36.6 °C)     Physical Exam  Constitutional:       General: She is not in acute distress.  HENT:      Head: Normocephalic.      Nose: Nose normal.      Mouth/Throat:      Mouth: Mucous membranes are moist.   Eyes:      Extraocular Movements: Extraocular movements intact.   Cardiovascular:      Rate and Rhythm: Normal rate.   Pulmonary:      Effort: Pulmonary effort is normal. No respiratory distress.   Abdominal:      General: Bowel sounds are normal. There is no distension.      Palpations: Abdomen is soft.   Musculoskeletal:         General: Normal range of motion.      Cervical back: Neck supple.   Skin:     General: Skin is warm.   Neurological:      Mental Status: She is alert and oriented to person, place, and time.   Psychiatric:         Behavior: Behavior normal.         Imaging / Labs     No visits with results within 1 Day(s) from this visit.   Latest known visit with results is:   Procedure visit on 03/21/2023   Component Date Value Ref Range Status    Case Report 03/21/2023    Final                    Value:Surgical Pathology                                Case: [id number]                                   Authorizing Provider:  Niyah King MD    Collected:           03/21/2023 12:14 PM          Ordering Location:     [facility]   Received:            03/22/2023  10:55 AM                                 Clinic - OB/GYN                                                              Pathologist:           Kailash Calixto MD                                                       Specimen:    Endometrium, EMB                                                                           Final Diagnosis 03/21/2023    Final                    Value:This result contains rich text formatting which cannot be displayed here.    Comments 03/21/2023    Final                    Value:This result contains rich text formatting which cannot be displayed here.    Gross Description 03/21/2023    Final                    Value:This result contains rich text formatting which cannot be displayed here.    Microscopic Description 03/21/2023    Final                    Value:This result contains rich text formatting which cannot be displayed here.    Clinical Information 03/21/2023    Final                    Value:This result contains rich text formatting which cannot be displayed here.    Laboratory Notes 03/21/2023    Final                    Value:This result contains rich text formatting which cannot be displayed here.    Lab Developed Test Disclosure 03/21/2023    Final                    Value:This result contains rich text formatting which cannot be displayed here.     Mammo Digital Screening Bilat  Narrative: Result:   Mammo Digital Screening Bilat     History:  Patient is 67 y.o. and is seen for a screening mammogram.    Films Compared:  Compared to: 10/27/2021 Mammo Digital Screening Bilat (No Change) and   10/21/2020 Mammo Digital Screening Bilat (No Change)     Findings:  The breasts are heterogeneously dense, which may obscure small masses.   There is no evidence of suspicious masses, calcifications, or other   abnormal findings.  Impression: Bilateral  There is no mammographic evidence of malignancy.    BI-RADS Category:   Overall: 1 - Negative       Recommendation:  Routine screening  mammogram in 1 year is recommended.    Your estimated lifetime risk of breast cancer (to age 85) based on   Tyrer-Cuzick risk assessment model is Tyrer-Cuzick: 8.44 %. According to   the American Cancer Society, patients with a lifetime breast cancer risk   of 20% or higher might benefit from supplemental screening tests.      Assessment and Plan (including Health Maintenance)      Problem List  Smart Sets  Document Outside HM   :    Health Maintenance Due   Topic Date Due    Hemoglobin A1c  03/27/2023    Foot Exam  04/13/2023       Problem List Items Addressed This Visit          Psychiatric    Recurrent major depressive disorder, in full remission    Current Assessment & Plan     Depression is stable  Refill celexa po           Relevant Medications    citalopram (CELEXA) 20 MG tablet       ENT    Seasonal allergic rhinitis due to pollen    Relevant Medications    cetirizine (ZYRTEC) 10 MG tablet       Cardiac/Vascular    Essential hypertension - Primary    Current Assessment & Plan     Condition appears stable  Refill meds today  Will return for labs when fasting    Follow up 6 mo; depending on labs           Relevant Medications    furosemide (LASIX) 40 MG tablet    lisinopriL (PRINIVIL,ZESTRIL) 40 MG tablet    hydroCHLOROthiazide (HYDRODIURIL) 12.5 MG Tab    Other Relevant Orders    Lipid Panel    CBC Auto Differential    Comprehensive Metabolic Panel    Hyperlipidemia    Current Assessment & Plan     Refill meds today  Will get labs when fasting and treat as indicated           Relevant Medications    simvastatin (ZOCOR) 40 MG tablet    Other Relevant Orders    Lipid Panel    CBC Auto Differential    Comprehensive Metabolic Panel       Renal/    Cyst of right ovary    Current Assessment & Plan     Will refill megace  States she lost refill from Dr ahuja  She has appointment to follow up with gyn next week           Relevant Medications    megestroL (MEGACE) 40 MG Tab       Endocrine    Type 2 diabetes  mellitus without complication, without long-term current use of insulin    Current Assessment & Plan     Refill meds today  Return for A1C  Continue to monitor glucose at home            Relevant Medications    JANUMET -1,000 mg TM24    Other Relevant Orders    Hemoglobin A1C    Vitamin B 12 deficiency    Relevant Medications    ergocalciferol (ERGOCALCIFEROL) 50,000 unit Cap    Other Relevant Orders    Vitamin B12 & Folate       Health Maintenance Topics with due status: Not Due       Topic Last Completion Date    Colorectal Cancer Screening 12/18/2015    DEXA Scan 04/21/2022    Diabetes Urine Screening 09/27/2022    Lipid Panel 09/27/2022    Mammogram 11/02/2022    Eye Exam 03/15/2023    Low Dose Statin 04/04/2023       Future Appointments   Date Time Provider Department Center   4/11/2023  9:45 AM Niyah King MD [facility]   10/4/2023  1:00 PM DELFINA Clark [facility]   11/8/2023 10:00 AM [facility] [place]          Signature:  DELFINA Clark [facility] [address] [place] [address]  [phone]    Date of encounter: 4/4/23

## 2023-04-04 NOTE — ASSESSMENT & PLAN NOTE
Condition appears stable  Refill meds today  Will return for labs when fasting    Follow up 6 mo; depending on labs

## 2023-04-05 ENCOUNTER — TELEPHONE (OUTPATIENT)
Dept: OBSTETRICS AND GYNECOLOGY | Facility: CLINIC | Age: 68
End: 2023-04-05
Payer: COMMERCIAL

## 2023-04-05 NOTE — TELEPHONE ENCOUNTER
----- Message from Marci Slaughter sent at 3/30/2023 11:54 AM CDT -----  [facility] in [place] (Noemi [phone]) needs to talk to you about this patient

## 2023-04-11 ENCOUNTER — OFFICE VISIT (OUTPATIENT)
Dept: OBSTETRICS AND GYNECOLOGY | Facility: CLINIC | Age: 68
End: 2023-04-11
Payer: MEDICARE

## 2023-04-11 VITALS
SYSTOLIC BLOOD PRESSURE: 142 MMHG | DIASTOLIC BLOOD PRESSURE: 73 MMHG | HEART RATE: 88 BPM | WEIGHT: 293 LBS | BODY MASS INDEX: 42.36 KG/M2

## 2023-04-11 DIAGNOSIS — N95.0 POSTMENOPAUSAL BLEEDING: ICD-10-CM

## 2023-04-11 DIAGNOSIS — N85.01 SIMPLE ENDOMETRIAL HYPERPLASIA: Primary | ICD-10-CM

## 2023-04-11 DIAGNOSIS — R93.89 THICKENED ENDOMETRIUM: ICD-10-CM

## 2023-04-11 PROCEDURE — 99214 PR OFFICE/OUTPT VISIT, EST, LEVL IV, 30-39 MIN: ICD-10-PCS | Mod: ,,, | Performed by: OBSTETRICS & GYNECOLOGY

## 2023-04-11 PROCEDURE — 99214 OFFICE O/P EST MOD 30 MIN: CPT | Mod: ,,, | Performed by: OBSTETRICS & GYNECOLOGY

## 2023-04-11 NOTE — PROGRESS NOTES
Subjective     Patient ID: Saray Jorgensen is a 67 y.o. female.    Chief Complaint: Well Woman (Annual w/pap)    This is a 66 y/o postmenopausal female with history of colon cancer who presents for f/u of endometrial biopsy and hysteroscopy. Pt has a history of simple endometrial hyperplasia for which she was treated in the past with progesterone. Pt was scheduled for a pap today; however, she declined and wants to reschedule.     Review of Systems   Genitourinary:  Positive for vaginal bleeding (postmenopausal).   All other systems reviewed and are negative.       Objective     Physical Exam  Vitals reviewed. Exam conducted with a chaperone present.   Constitutional:       Appearance: Normal appearance.   HENT:      Head: Normocephalic and atraumatic.      Mouth/Throat:      Mouth: Mucous membranes are moist.   Eyes:      Extraocular Movements: Extraocular movements intact.      Pupils: Pupils are equal, round, and reactive to light.   Cardiovascular:      Rate and Rhythm: Normal rate and regular rhythm.      Pulses: Normal pulses.      Heart sounds: Normal heart sounds.   Pulmonary:      Effort: Pulmonary effort is normal.      Breath sounds: Normal breath sounds.   Abdominal:      General: Abdomen is flat. Bowel sounds are normal.      Palpations: Abdomen is soft.   Musculoskeletal:         General: Normal range of motion.      Cervical back: Normal range of motion.   Skin:     General: Skin is warm and dry.   Neurological:      General: No focal deficit present.      Mental Status: She is alert and oriented to person, place, and time.   Psychiatric:         Mood and Affect: Mood normal.         Behavior: Behavior normal.         Thought Content: Thought content normal.         Judgment: Judgment normal.          Assessment and Plan     Problem List Items Addressed This Visit    None  Visit Diagnoses       Simple endometrial hyperplasia    -  Primary    Relevant Orders    Case Request Operating Room: HYSTERECTOMY,  TOTAL, LAPAROSCOPIC, BSO (Completed)    Thickened endometrium        Relevant Orders    Case Request Operating Room: HYSTERECTOMY, TOTAL, LAPAROSCOPIC, BSO (Completed)    Postmenopausal bleeding        Relevant Orders    Case Request Operating Room: HYSTERECTOMY, TOTAL, LAPAROSCOPIC, BSO (Completed)               Final Diagnosis   A. Endometrium, biopsy:  - High-grade squamous intraepithelial lesion (HSIL/HETAL 2-3)  - Gestational-type endometrium and endocervical tissue with findings suggestive of endometrial polyp         Biopsy results reviewed with the pt.   Pt would like to proceed with hysterectomy  Pt scheduled for TLH, Bso on 5/17/2023  RTC on 5/15/2023 for annual

## 2023-04-24 DIAGNOSIS — E11.9 TYPE 2 DIABETES MELLITUS WITHOUT COMPLICATION, WITHOUT LONG-TERM CURRENT USE OF INSULIN: Primary | ICD-10-CM

## 2023-04-24 RX ORDER — GLIPIZIDE 5 MG/1
5 TABLET, FILM COATED, EXTENDED RELEASE ORAL
Qty: 90 TABLET | Refills: 1 | Status: SHIPPED | OUTPATIENT
Start: 2023-04-24 | End: 2023-09-07

## 2023-05-02 ENCOUNTER — TELEPHONE (OUTPATIENT)
Dept: OBSTETRICS AND GYNECOLOGY | Facility: CLINIC | Age: 68
End: 2023-05-02
Payer: COMMERCIAL

## 2023-05-02 NOTE — TELEPHONE ENCOUNTER
----- Message from Marci Slaughter sent at 4/27/2023  3:36 PM CDT -----  [phone]    Question about medications

## 2023-05-05 ENCOUNTER — TELEPHONE (OUTPATIENT)
Dept: OBSTETRICS AND GYNECOLOGY | Facility: CLINIC | Age: 68
End: 2023-05-05
Payer: COMMERCIAL

## 2023-05-05 NOTE — TELEPHONE ENCOUNTER
Pt called stating she is starting to bleed on Tuesday only in the morning after urinating/ Pt denies any pain. Informed pt  is out of the clinic until Monday/ Pt states she has an appt on Monday 05/15/2023. Pt verbalized understanding/

## 2023-05-05 NOTE — TELEPHONE ENCOUNTER
----- Message from Vanda Castanon MA sent at 5/4/2023 12:11 PM CDT -----  PERIOD FOR FEW DAY THEN STOP AND THEN COME BACK ON

## 2023-05-08 ENCOUNTER — TELEPHONE (OUTPATIENT)
Dept: OBSTETRICS AND GYNECOLOGY | Facility: CLINIC | Age: 68
End: 2023-05-08
Payer: COMMERCIAL

## 2023-05-15 ENCOUNTER — OFFICE VISIT (OUTPATIENT)
Dept: OBSTETRICS AND GYNECOLOGY | Facility: CLINIC | Age: 68
End: 2023-05-15
Payer: COMMERCIAL

## 2023-05-15 VITALS
HEART RATE: 91 BPM | WEIGHT: 293 LBS | DIASTOLIC BLOOD PRESSURE: 84 MMHG | BODY MASS INDEX: 42.36 KG/M2 | SYSTOLIC BLOOD PRESSURE: 148 MMHG

## 2023-05-15 DIAGNOSIS — N85.01 SIMPLE ENDOMETRIAL HYPERPLASIA: ICD-10-CM

## 2023-05-15 DIAGNOSIS — N95.0 POSTMENOPAUSAL BLEEDING: ICD-10-CM

## 2023-05-15 DIAGNOSIS — Z01.419 ROUTINE GYNECOLOGICAL EXAMINATION: ICD-10-CM

## 2023-05-15 DIAGNOSIS — Z12.4 CERVICAL CANCER SCREENING: Primary | ICD-10-CM

## 2023-05-15 PROCEDURE — 88142 CYTOPATH C/V THIN LAYER: CPT | Mod: TC,GCY | Performed by: OBSTETRICS & GYNECOLOGY

## 2023-05-15 PROCEDURE — 99397 PR PREVENTIVE VISIT,EST,65 & OVER: ICD-10-PCS | Mod: ,,, | Performed by: OBSTETRICS & GYNECOLOGY

## 2023-05-15 PROCEDURE — 87624 HPV HI-RISK TYP POOLED RSLT: CPT | Mod: ,,, | Performed by: CLINICAL MEDICAL LABORATORY

## 2023-05-15 PROCEDURE — 99397 PER PM REEVAL EST PAT 65+ YR: CPT | Mod: ,,, | Performed by: OBSTETRICS & GYNECOLOGY

## 2023-05-15 PROCEDURE — 87624 HUMAN PAPILLOMAVIRUS (HPV): ICD-10-PCS | Mod: ,,, | Performed by: CLINICAL MEDICAL LABORATORY

## 2023-05-15 NOTE — PROGRESS NOTES
CC: Well woman exam    Saray Jorgensen is a 67 y.o. female  presents for well woman exam.    LMP: No LMP recorded. Patient is postmenopausal..    Last mammogram: 22  Last Colonoscopy: 12-18-15    Pt c/o vaginal bleeding.   Pt admits that she has not stopped her ASA in preparation for surgery this week.   Past Medical History:   Diagnosis Date    Colon cancer     Diabetes     Diabetes mellitus, type 2     Gallstones     History of colonoscopy 2014    Hyperlipidemia     Hypertension      Past Surgical History:   Procedure Laterality Date    COLECTOMY, RIGHT       Social History     Socioeconomic History    Marital status:    Occupational History     Comment: Disabled   Tobacco Use    Smoking status: Never     Passive exposure: Never    Smokeless tobacco: Never   Substance and Sexual Activity    Alcohol use: Never    Drug use: Never    Sexual activity: Not Currently     Family History   Problem Relation Age of Onset    Diabetes Mother     Hypertension Mother     Prostate cancer Father     Diabetes Sister     Hypertension Sister     Diabetes Brother     Hypertension Brother      OB History          5    Para   5    Term   5            AB        Living             SAB        IAB        Ectopic        Multiple        Live Births                     BP (!) 148/84 Comment: manual  Pulse 91   Wt 133.9 kg (295 lb 3.2 oz)   BMI 42.36 kg/m²       ROS:  GENERAL: Denies weight gain or weight loss. Feeling well overall.   SKIN: Denies rash or lesions.   HEAD: Denies head injury or headache.   NODES: Denies enlarged lymph nodes.   CHEST: Denies chest pain or shortness of breath.   CARDIOVASCULAR: Denies palpitations or left sided chest pain.   ABDOMEN: No abdominal pain, constipation, diarrhea, nausea, vomiting or rectal bleeding.   URINARY: No frequency, dysuria, hematuria, or burning on urination.  REPRODUCTIVE: See HPI.   BREASTS: The patient performs breast self-examination and denies  pain, lumps, or nipple discharge.   HEMATOLOGIC: No easy bruisability or excessive bleeding.   MUSCULOSKELETAL: Denies joint pain or swelling.   NEUROLOGIC: Denies syncope or weakness.   PSYCHIATRIC: Denies depression, anxiety or mood swings.    PHYSICAL EXAM:  APPEARANCE: Well nourished, well developed, in no acute distress.  AFFECT: WNL, alert and oriented x 3  SKIN: No acne or hirsutism  NECK: Neck symmetric without masses or thyromegaly  NODES: No inguinal, cervical, axillary, or femoral lymph node enlargement  CHEST: Good respiratory effect  ABDOMEN: Soft.  No tenderness or masses.  No hepatosplenomegaly.  No hernias.  BREASTS: Symmetrical, no skin changes or visible lesions.  No palpable masses, nipple discharge bilaterally.  PELVIC: Normal external genitalia without lesions.  Normal hair distribution.  Adequate perineal body, normal urethral meatus.  Vagina moist and well rugated without lesions or discharge.  Cervix pink, without lesions, discharge or tenderness.  No significant cystocele or rectocele.  Bimanual exam shows uterus to be normal size, regular, mobile and nontender.  Adnexa without masses or tenderness.    EXTREMITIES: No edema.    Cervical cancer screening  -     ThinPrep Pap Test    Routine gynecological examination  -     ThinPrep Pap Test    Simple endometrial hyperplasia    Postmenopausal bleeding            Patient was counseled today on A.C.S. Pap guidelines and recommendations for yearly pelvic exams, mammograms and monthly self breast exams; to see her PCP for other health maintenance.   Exercise regimen encouraged  Healthy food choices encouraged  Questions answered to desired level of satisfaction  Verbalized understanding to all information and instructions    Follow up in about 1 year (around 5/15/2024) for annual.    Surgery will need to be rescheduled as pt will need to hold her ASA for at least a week prior to surgery.   TLH, BSO re-scheduled for 5/31/2023  Risks, benefits and  alternatives to the procedure reviewed with the pt  After discussion of the risk, alternatives, benefits, and indication of surgery, as well as the risk of surgery, questions were sought and answered and informed consent obtained to proceed with surgery. We discussed the risk of the procedures to include, but not be limited to, 1) bleeding, which could be possibly excessive, potentially requiring a blood transfusion and subsequent risk of hepatitis (1 in 300,000), transfusion reaction (<1%), and HIV (1 in 1,000,000); 2) injury to internal organs including the bowel, bladder, great vessels, nerves, and ureters, potentially requiring further surgery at that time or at a later date; 3) infection requiring a prolonged hospital stay and antibiotics with possible drainage of an abscess or wound breakdown; 4) anesthetic complications; 5) deep venous thrombosis and the risk of pulmonary emboli; 6) pneumonia; and 7) infertility and/or menopause, 8) possible death. All questions were answered and a consent form was signed. She stated she understood the above risks and desired to proceed.   All questions answered to the level of the patient's satisfaction.   Case request and orders done.   Written materials provided  Consent obtained.        Niyah King M.D., OG    OB/GYN

## 2023-05-18 LAB
GH SERPL-MCNC: NORMAL NG/ML
INSULIN SERPL-ACNC: NORMAL U[IU]/ML
LAB AP CLINICAL INFORMATION: NORMAL
LAB AP GYN INTERPRETATION: NEGATIVE
LAB AP PAP DISCLAIMER COMMENTS: NORMAL
RENIN PLAS-CCNC: NORMAL NG/ML/H

## 2023-05-19 LAB
HPV 16: NEGATIVE
HPV 18: NEGATIVE
HPV OTHER: NEGATIVE

## 2023-05-22 DIAGNOSIS — Z01.812 PRE-OPERATIVE LABORATORY EXAMINATION: Primary | ICD-10-CM

## 2023-05-31 ENCOUNTER — ANESTHESIA EVENT (OUTPATIENT)
Dept: SURGERY | Facility: HOSPITAL | Age: 68
DRG: 743 | End: 2023-05-31
Payer: COMMERCIAL

## 2023-05-31 ENCOUNTER — ANESTHESIA (OUTPATIENT)
Dept: SURGERY | Facility: HOSPITAL | Age: 68
DRG: 743 | End: 2023-05-31
Payer: COMMERCIAL

## 2023-05-31 PROBLEM — Z90.710 STATUS POST LAPAROSCOPIC HYSTERECTOMY: Status: ACTIVE | Noted: 2023-05-31

## 2023-05-31 PROCEDURE — D9220A PRA ANESTHESIA: ICD-10-PCS | Mod: ANES,,, | Performed by: ANESTHESIOLOGY

## 2023-05-31 PROCEDURE — 25000003 PHARM REV CODE 250: Performed by: NURSE ANESTHETIST, CERTIFIED REGISTERED

## 2023-05-31 PROCEDURE — 64488 TAP BLOCK BI INJECTION: CPT | Performed by: ANESTHESIOLOGY

## 2023-05-31 PROCEDURE — 27000165 HC TUBE, ETT CUFFED: Performed by: ANESTHESIOLOGY

## 2023-05-31 PROCEDURE — D9220A PRA ANESTHESIA: Mod: CRNA,,, | Performed by: NURSE ANESTHETIST, CERTIFIED REGISTERED

## 2023-05-31 PROCEDURE — 25000003 PHARM REV CODE 250: Performed by: OBSTETRICS & GYNECOLOGY

## 2023-05-31 PROCEDURE — 27000716 HC OXISENSOR PROBE, ANY SIZE: Performed by: ANESTHESIOLOGY

## 2023-05-31 PROCEDURE — D9220A PRA ANESTHESIA: ICD-10-PCS | Mod: CRNA,,, | Performed by: NURSE ANESTHETIST, CERTIFIED REGISTERED

## 2023-05-31 PROCEDURE — 27000655: Performed by: ANESTHESIOLOGY

## 2023-05-31 PROCEDURE — 64488 PR TAP BLOCK BILAT; BY INJECTION(S) INCL IMAG GUIDANCE: ICD-10-PCS | Mod: 59,,, | Performed by: ANESTHESIOLOGY

## 2023-05-31 PROCEDURE — 63600175 PHARM REV CODE 636 W HCPCS: Performed by: NURSE ANESTHETIST, CERTIFIED REGISTERED

## 2023-05-31 PROCEDURE — 27000689 HC BLADE LARYNGOSCOPE ANY SIZE: Performed by: ANESTHESIOLOGY

## 2023-05-31 PROCEDURE — 27000509 HC TUBE SALEM SUMP ANY SIZE: Performed by: ANESTHESIOLOGY

## 2023-05-31 PROCEDURE — 63600175 PHARM REV CODE 636 W HCPCS: Performed by: ANESTHESIOLOGY

## 2023-05-31 PROCEDURE — 64488 TAP BLOCK BI INJECTION: CPT | Mod: 59,,, | Performed by: ANESTHESIOLOGY

## 2023-05-31 PROCEDURE — D9220A PRA ANESTHESIA: Mod: ANES,,, | Performed by: ANESTHESIOLOGY

## 2023-05-31 RX ORDER — ROCURONIUM BROMIDE 10 MG/ML
INJECTION, SOLUTION INTRAVENOUS
Status: DISCONTINUED | OUTPATIENT
Start: 2023-05-31 | End: 2023-05-31

## 2023-05-31 RX ORDER — CEFAZOLIN SODIUM 1 G/3ML
INJECTION, POWDER, FOR SOLUTION INTRAMUSCULAR; INTRAVENOUS
Status: DISCONTINUED | OUTPATIENT
Start: 2023-05-31 | End: 2023-05-31

## 2023-05-31 RX ORDER — KETOROLAC TROMETHAMINE 30 MG/ML
INJECTION, SOLUTION INTRAMUSCULAR; INTRAVENOUS
Status: DISCONTINUED | OUTPATIENT
Start: 2023-05-31 | End: 2023-05-31

## 2023-05-31 RX ORDER — MIDAZOLAM HYDROCHLORIDE 1 MG/ML
INJECTION INTRAMUSCULAR; INTRAVENOUS
Status: DISCONTINUED | OUTPATIENT
Start: 2023-05-31 | End: 2023-05-31

## 2023-05-31 RX ORDER — MORPHINE SULFATE 8 MG/ML
INJECTION INTRAMUSCULAR; INTRAVENOUS; SUBCUTANEOUS
Status: DISCONTINUED | OUTPATIENT
Start: 2023-05-31 | End: 2023-05-31

## 2023-05-31 RX ORDER — ONDANSETRON 2 MG/ML
INJECTION INTRAMUSCULAR; INTRAVENOUS
Status: DISCONTINUED | OUTPATIENT
Start: 2023-05-31 | End: 2023-05-31

## 2023-05-31 RX ORDER — ROPIVACAINE HYDROCHLORIDE 5 MG/ML
INJECTION, SOLUTION EPIDURAL; INFILTRATION; PERINEURAL
Status: COMPLETED | OUTPATIENT
Start: 2023-05-31 | End: 2023-05-31

## 2023-05-31 RX ORDER — DEXAMETHASONE SODIUM PHOSPHATE 4 MG/ML
INJECTION, SOLUTION INTRA-ARTICULAR; INTRALESIONAL; INTRAMUSCULAR; INTRAVENOUS; SOFT TISSUE
Status: DISCONTINUED | OUTPATIENT
Start: 2023-05-31 | End: 2023-05-31

## 2023-05-31 RX ORDER — PROPOFOL 10 MG/ML
VIAL (ML) INTRAVENOUS
Status: DISCONTINUED | OUTPATIENT
Start: 2023-05-31 | End: 2023-05-31

## 2023-05-31 RX ORDER — FENTANYL CITRATE 50 UG/ML
INJECTION, SOLUTION INTRAMUSCULAR; INTRAVENOUS
Status: DISCONTINUED | OUTPATIENT
Start: 2023-05-31 | End: 2023-05-31

## 2023-05-31 RX ORDER — LIDOCAINE HYDROCHLORIDE 20 MG/ML
INJECTION INTRAVENOUS
Status: DISCONTINUED | OUTPATIENT
Start: 2023-05-31 | End: 2023-05-31

## 2023-05-31 RX ADMIN — MORPHINE SULFATE 8 MG: 8 INJECTION INTRAVENOUS at 09:05

## 2023-05-31 RX ADMIN — LIDOCAINE HYDROCHLORIDE 80 MG: 20 INJECTION, SOLUTION INTRAVENOUS at 07:05

## 2023-05-31 RX ADMIN — KETOROLAC TROMETHAMINE 60 MG: 30 INJECTION, SOLUTION INTRAMUSCULAR at 07:05

## 2023-05-31 RX ADMIN — SUGAMMADEX 200 MG: 100 INJECTION, SOLUTION INTRAVENOUS at 09:05

## 2023-05-31 RX ADMIN — CEFAZOLIN 2 G: 1 INJECTION, POWDER, FOR SOLUTION INTRAMUSCULAR; INTRAVENOUS; PARENTERAL at 07:05

## 2023-05-31 RX ADMIN — ONDANSETRON 4 MG: 2 INJECTION INTRAMUSCULAR; INTRAVENOUS at 07:05

## 2023-05-31 RX ADMIN — FENTANYL CITRATE 100 MCG: 50 INJECTION INTRAMUSCULAR; INTRAVENOUS at 07:05

## 2023-05-31 RX ADMIN — ROCURONIUM BROMIDE 20 MG: 10 INJECTION, SOLUTION INTRAVENOUS at 08:05

## 2023-05-31 RX ADMIN — SODIUM CHLORIDE: 9 INJECTION, SOLUTION INTRAVENOUS at 07:05

## 2023-05-31 RX ADMIN — DEXAMETHASONE SODIUM PHOSPHATE 4 MG: 4 INJECTION, SOLUTION INTRA-ARTICULAR; INTRALESIONAL; INTRAMUSCULAR; INTRAVENOUS; SOFT TISSUE at 07:05

## 2023-05-31 RX ADMIN — PROPOFOL 200 MG: 10 INJECTION, EMULSION INTRAVENOUS at 07:05

## 2023-05-31 RX ADMIN — ROPIVACAINE HYDROCHLORIDE 40 ML: 5 INJECTION, SOLUTION EPIDURAL; INFILTRATION; PERINEURAL at 07:05

## 2023-05-31 RX ADMIN — MIDAZOLAM 2 MG: 1 INJECTION INTRAMUSCULAR; INTRAVENOUS at 07:05

## 2023-05-31 RX ADMIN — ROCURONIUM BROMIDE 50 MG: 10 INJECTION, SOLUTION INTRAVENOUS at 07:05

## 2023-05-31 NOTE — ANESTHESIA POSTPROCEDURE EVALUATION
Anesthesia Post Evaluation    Patient: Saray Jorgensen    Procedure(s) Performed: Procedure(s) (LRB):  HYSTERECTOMY,TOTAL,LAPAROSCOPIC,WITH SALPINGO-OOPHORECTOMY (Bilateral)  CYSTOSCOPY (N/A)    Final Anesthesia Type: general      Patient location during evaluation: PACU  Patient participation: Yes- Able to Participate  Level of consciousness: awake and sedated  Post-procedure vital signs: reviewed and stable  Pain management: adequate  Airway patency: patent    PONV status at discharge: No PONV  Anesthetic complications: no      Cardiovascular status: blood pressure returned to baseline  Respiratory status: unassisted  Hydration status: euvolemic  Follow-up not needed.          Vitals Value Taken Time   /62 05/31/23 1346   Temp 36.7 °C (98 °F) 05/31/23 1009   Pulse 70 05/31/23 1359   Resp 18 05/31/23 1245   SpO2 98 % 05/31/23 1359   Vitals shown include unvalidated device data.      Event Time   Out of Recovery 11:00:00         Pain/Abilio Score: Abilio Score: 10 (5/31/2023 11:00 AM)

## 2023-05-31 NOTE — ANESTHESIA PROCEDURE NOTES
Peripheral Block    Patient location during procedure: pre-op   Block not for primary anesthetic.  Reason for block: at surgeon's request and post-op pain management   Post-op Pain Location: abdominal wall pain   Start time: 5/31/2023 7:49 AM  Timeout: 5/31/2023 7:48 AM   End time: 5/31/2023 7:57 AM    Staffing  Authorizing Provider: Michael Denise MD  Performing Provider: Michael Denise MD    Preanesthetic Checklist  Completed: patient identified, IV checked, site marked, risks and benefits discussed, surgical consent, monitors and equipment checked, pre-op evaluation and timeout performed  Peripheral Block  Patient position: supine  Prep: ChloraPrep  Patient monitoring: cardiac monitor, heart rate, continuous pulse ox, continuous capnometry and frequent blood pressure checks  Block type: TAP  Laterality: bilateral  Injection technique: single shot  Needle  Needle type: Stimuplex   Needle gauge: 21 G  Needle length: 4 in  Needle localization: ultrasound guidance   -ultrasound image captured on disc.  Assessment  Injection assessment: negative aspiration, negative parasthesia and local visualized surrounding nerve  Paresthesia pain: none  Heart rate change: no  Slow fractionated injection: yes    Medications:    Medications: ROPIvacaine (NAROPIN) injection 0.5% - Perineural   40 mL - 5/31/2023 7:58:00 AM    Additional Notes  VSS.  [facility] RN monitoring vitals throughout procedure.  Patient tolerated procedure well.

## 2023-05-31 NOTE — ANESTHESIA PREPROCEDURE EVALUATION
05/31/2023  Saray Jorgensen is a 67 y.o., female.      Pre-op Assessment    I have reviewed the Patient Summary Reports.     I have reviewed the Nursing Notes. I have reviewed the NPO Status.   I have reviewed the Medications.     Review of Systems  Anesthesia Hx:  No problems with previous Anesthesia    Social:  Non-Smoker, No Alcohol Use    Hematology/Oncology:  Hematology Normal       -- Cancer in past history:    EENT/Dental:EENT/Dental Normal   Cardiovascular:   Hypertension    Pulmonary:  Pulmonary Normal    Renal/:  Renal/ Normal     Hepatic/GI:  Hepatic/GI Normal Hx colon cancer / resection   Musculoskeletal:  Musculoskeletal Normal    Neurological:  Neurology Normal    Endocrine:   Diabetes, well controlled, type 2  Morbid Obesity / BMI > 40  Dermatological:  Skin Normal    Psych:   anxiety          Physical Exam  General: Well nourished    Airway:  Mallampati: III / III  Mouth Opening: Normal  TM Distance: > 6 cm  Tongue: Normal  Neck ROM: Normal ROM    Chest/Lungs:  Clear to auscultation, Normal Respiratory Rate    Heart:  Rate: Normal  Rhythm: Regular Rhythm        Anesthesia Plan  Type of Anesthesia, risks & benefits discussed:    Anesthesia Type: Gen ETT, Regional  Intra-op Monitoring Plan: Standard ASA Monitors  Post Op Pain Control Plan: multimodal analgesia  Induction:  IV  Informed Consent: Informed consent signed with the Patient and all parties understand the risks and agree with anesthesia plan.  All questions answered. Patient consented to blood products? Yes  ASA Score: 3  Day of Surgery Review of History & Physical: H&P Update referred to the surgeon/provider.I have interviewed and examined the patient. I have reviewed the patient's H&P dated: There are no significant changes. H&P completed by Anesthesiologist.    Ready For Surgery From Anesthesia Perspective.     .

## 2023-05-31 NOTE — ANESTHESIA PROCEDURE NOTES
Intubation    Date/Time: 5/31/2023 7:48 AM  Performed by: [place] Dias CRNA  Authorized by: Michael Denise MD     Intubation:     Induction:  Intravenous    Intubated:  Postinduction    Mask Ventilation:  Easy mask    Attempts:  1    Attempted By:  CRNA    Blade:  Avel 3    Laryngeal View Grade: Grade IIb - only the arytenoids and epiglottis seen      Difficult Airway Encountered?: No      Complications:  None    Airway Device:  Oral endotracheal tube    Airway Device Size:  7.5    Style/Cuff Inflation:  Cuffed (inflated to minimal occlusive pressure)    Inflation Amount (mL):  8    Tube secured:  22    Secured at:  The teeth    Placement Verified By:  Capnometry    Complicating Factors:  None    Findings Post-Intubation:  BS equal bilateral

## 2023-05-31 NOTE — TRANSFER OF CARE
"Anesthesia Transfer of Care Note    Patient: Saray Jorgensen    Procedure(s) Performed: Procedure(s) (LRB):  HYSTERECTOMY,TOTAL,LAPAROSCOPIC,WITH SALPINGO-OOPHORECTOMY (Bilateral)  CYSTOSCOPY (N/A)    Patient location: PACU    Anesthesia Type: general    Transport from OR: Transported from OR on 6-10 L/min O2 by face mask with adequate spontaneous ventilation    Post pain: adequate analgesia    Post assessment: no apparent anesthetic complications    Post vital signs: stable    Level of consciousness: awake and alert    Nausea/Vomiting: no nausea/vomiting    Complications: none    Transfer of care protocol was followed      Last vitals:   Visit Vitals  BP (!) 192/93 (BP Location: Right arm, Patient Position: Lying)   Pulse 102   Temp 36.8 °C (98.2 °F) (Oral)   Resp 20   Ht 5' 10" (1.778 m)   Wt 132 kg (291 lb)   SpO2 98%   Breastfeeding No   BMI 41.75 kg/m²     "

## 2023-06-14 ENCOUNTER — OFFICE VISIT (OUTPATIENT)
Dept: OBSTETRICS AND GYNECOLOGY | Facility: CLINIC | Age: 68
End: 2023-06-14
Payer: MEDICARE

## 2023-06-14 VITALS
SYSTOLIC BLOOD PRESSURE: 148 MMHG | DIASTOLIC BLOOD PRESSURE: 83 MMHG | BODY MASS INDEX: 41.63 KG/M2 | RESPIRATION RATE: 20 BRPM | TEMPERATURE: 98 F | OXYGEN SATURATION: 98 % | HEIGHT: 70 IN | WEIGHT: 290.81 LBS | HEART RATE: 94 BPM

## 2023-06-14 DIAGNOSIS — Z90.710 STATUS POST LAPAROSCOPIC HYSTERECTOMY: Primary | ICD-10-CM

## 2023-06-14 PROCEDURE — 99024 PR POST-OP FOLLOW-UP VISIT: ICD-10-PCS | Mod: ,,, | Performed by: ADVANCED PRACTICE MIDWIFE

## 2023-06-14 PROCEDURE — 99024 POSTOP FOLLOW-UP VISIT: CPT | Mod: ,,, | Performed by: ADVANCED PRACTICE MIDWIFE

## 2023-06-14 NOTE — PROGRESS NOTES
"CC: Postop visit     Saray Jorgensen is a 67 y.o. female  post-op from a Hysterectomy on 2023.  Patient is doing well postoperatively.  No pain.  No bowel or bladder complaints.  No fever.      The pathology revealed:  Uterus with bilateral tubes and ovaries, total hysterectomy:  - Benign uterine cervix  - Benign uterine corpus with benign endometrium, adenomyosis, leiomyomata, and endometrial polyps with cystic change  - Benign ovaries  - Benign uterine tubes    Past Medical History:   Diagnosis Date    Colon cancer     Diabetes     Diabetes mellitus, type 2     Gallstones     History of colonoscopy 2014    Hyperlipidemia     Hypertension      Past Surgical History:   Procedure Laterality Date    COLECTOMY, RIGHT      CYSTOSCOPY N/A 2023    Procedure: CYSTOSCOPY;  Surgeon: Niyah King MD;  Location: [facility], [place];  Service: OB/GYN;  Laterality: N/A;    HYSTERECTOMY, TOTAL, LAPAROSCOPIC, WITH SALPINGO-OOPHORECTOMY Bilateral 2023    Procedure: HYSTERECTOMY,TOTAL,LAPAROSCOPIC,WITH SALPINGO-OOPHORECTOMY;  Surgeon: Niyah King MD;  Location: [facility] OR;  Service: OB/GYN;  Laterality: Bilateral;     Family History   Problem Relation Age of Onset    Diabetes Mother     Hypertension Mother     Prostate cancer Father     Diabetes Sister     Hypertension Sister     Diabetes Brother     Hypertension Brother      Social History     Tobacco Use    Smoking status: Never     Passive exposure: Never    Smokeless tobacco: Never   Substance Use Topics    Alcohol use: Never    Drug use: Never     OB History    Para Term  AB Living   5 5 5         SAB IAB Ectopic Multiple Live Births                  # Outcome Date GA Lbr Franky/2nd Weight Sex Delivery Anes PTL Lv   5 Term            4 Term            3 Term            2 Term            1 Term                BP (!) 148/83   Pulse 94   Temp 98.4 °F (36.9 °C) (Oral)   Resp 20   Ht 5' 10" (1.778 m)   Wt 131.9 kg (290 lb 12.8 " oz)   SpO2 98%   BMI 41.73 kg/m²     ROS:  GENERAL: No fever, chills, fatigability or weight loss.  VULVAR: No pain, no lesions and no itching.  VAGINAL: No relaxation, no itching, no discharge, no abnormal bleeding and no lesions.  ABDOMEN: No abdominal pain. Denies nausea. Denies vomiting. No diarrhea. No constipation  BREAST: Denies pain. No lumps. No discharge.  URINARY: No incontinence, no nocturia, no frequency and no dysuria.  CARDIOVASCULAR: No chest pain. No shortness of breath. No leg cramps.  NEUROLOGICAL: No headaches. No vision changes.    PE:   SKIN: Warm, dry to touch  ABDOMEN:  Soft, non-tender, non-distended. umbilical incision healing well without any redness, edema, ecchymosis, or drainage noted with edges well approximated  PELVIC: deferred      ICD-10-CM ICD-9-CM    1. Status post laparoscopic hysterectomy  Z90.710 V88.01         Continue routine post op care  No heavy lifting  Verbalized understanding to all instructions and information  Questions answered to desired level of satisfaction      Follow up in 2 weeks (on 6/28/2023), or if symptoms worsen or fail to improve, for f/u in 2 wks with Dr. Vivas for post op visit.    Ellen Avendano DNP, CNM, WHNP-BC

## 2023-06-15 DIAGNOSIS — N83.201 CYST OF RIGHT OVARY: ICD-10-CM

## 2023-06-18 RX ORDER — MEGESTROL ACETATE 40 MG/1
TABLET ORAL
Qty: 120 TABLET | Refills: 2 | Status: SHIPPED | OUTPATIENT
Start: 2023-06-18 | End: 2024-03-21 | Stop reason: CLARIF

## 2023-06-26 ENCOUNTER — OFFICE VISIT (OUTPATIENT)
Dept: FAMILY MEDICINE | Facility: CLINIC | Age: 68
End: 2023-06-26
Payer: COMMERCIAL

## 2023-06-26 VITALS
BODY MASS INDEX: 41.95 KG/M2 | SYSTOLIC BLOOD PRESSURE: 126 MMHG | DIASTOLIC BLOOD PRESSURE: 82 MMHG | HEIGHT: 70 IN | TEMPERATURE: 98 F | RESPIRATION RATE: 18 BRPM | HEART RATE: 89 BPM | WEIGHT: 293 LBS | OXYGEN SATURATION: 98 %

## 2023-06-26 DIAGNOSIS — J06.9 VIRAL URI: Primary | ICD-10-CM

## 2023-06-26 LAB
CTP QC/QA: YES
FLUAV AG NPH QL: NEGATIVE
FLUBV AG NPH QL: NEGATIVE
SARS-COV-2 AG RESP QL IA.RAPID: NEGATIVE

## 2023-06-26 PROCEDURE — 99213 PR OFFICE/OUTPT VISIT, EST, LEVL III, 20-29 MIN: ICD-10-PCS | Mod: ,,, | Performed by: STUDENT IN AN ORGANIZED HEALTH CARE EDUCATION/TRAINING PROGRAM

## 2023-06-26 PROCEDURE — 99213 OFFICE O/P EST LOW 20 MIN: CPT | Mod: ,,, | Performed by: STUDENT IN AN ORGANIZED HEALTH CARE EDUCATION/TRAINING PROGRAM

## 2023-06-26 PROCEDURE — 87428 SARSCOV & INF VIR A&B AG IA: CPT | Mod: RHCUB | Performed by: STUDENT IN AN ORGANIZED HEALTH CARE EDUCATION/TRAINING PROGRAM

## 2023-06-26 RX ORDER — IPRATROPIUM BROMIDE 21 UG/1
2 SPRAY, METERED NASAL 2 TIMES DAILY
Qty: 30 ML | Refills: 0 | Status: SHIPPED | OUTPATIENT
Start: 2023-06-26 | End: 2023-10-05

## 2023-06-26 NOTE — PROGRESS NOTES
Progress Note     TATE MONTOYA MD   [address]  [place] [address]     PATIENT NAME: Saray Jorgensen  : 1955  DATE: 23  MRN: [id number]      Billing Provider: TATE MONTOYA MD  Level of Service: DE OFFICE/OUTPT VISIT, EST, LEVL III, 20-29 MIN  Patient PCP Information       Provider PCP Type    [facility]                Nasal Congestion (All symptoms X 1 week. /Pt states daughter tested positive for COVID 2 weeks ago. ), Cough (Productive cough with clear mucus. ), and Headache (Pt states she has pressure between her eyes. )      SUBJECTIVE:     Saray Jorgensen is a 67 y.o.female who presents to clinic for Nasal Congestion (All symptoms X 1 week. /Pt states daughter tested positive for COVID 2 weeks ago. ), Cough (Productive cough with clear mucus. ), and Headache (Pt states she has pressure between her eyes. )    Patient presents to clinic today for evaluation for URI symptoms.  Patient developed congestion, watery eyes, sneezing, and coughing about 2-3 weeks ago.  Patient did have tablet at this time.  Patient notes her symptoms have improved significantly.  He is only having clear nasal drainage at this time.  She notes only occasional cough that improved with honey cough drops.  She still is having some nasal congestion.  She has occasional mild sinus pain but denies any purulent nasal drainage.  She has been afebrile.  She denies any shortness of breath or chest pain. She is feeling much better overall.   She only presents to clinic today for COVID testing per her daughter's request.    Patient has been taking her Zyrtec which she routinely takes for allergies.  She has not been using her Flonase as it worsens her symptoms.      Past Medical History:  has a past medical history of Colon cancer, Diabetes, Diabetes mellitus, type 2, Gallstones, History of colonoscopy (2014), Hyperlipidemia, and Hypertension.   Past Surgical History:  has a  past surgical history that includes colectomy, right; hysterectomy, total, laparoscopic, with salpingo-oophorectomy (Bilateral, 05/31/2023); cystoscopy (N/A, 05/31/2023); and Hysterectomy.  Family History: family history includes Diabetes in her brother, mother, and sister; Hypertension in her brother, mother, and sister; Prostate cancer in her father.  Social History:  reports that she has never smoked. She has never been exposed to tobacco smoke. She has never used smokeless tobacco. She reports that she does not drink alcohol and does not use drugs.  Allergies: Review of patient's allergies indicates:  No Known Allergies      Current Outpatient Medications:     ascorbic acid-multivit-min (VITAMIN C ENERGY BOOSTER) 1,000 mg PwEP, Take 1 packet by mouth once daily., Disp: , Rfl:     ascorbic acid-multivit-min (VITAMIN C FIZZY DRINK) 1,000 mg PwEP, Take 1 packet by mouth once daily., Disp: , Rfl:     aspirin 325 MG tablet, Take 1 tablet (325 mg total) by mouth once daily., Disp: 90 tablet, Rfl: 2    calcium carbonate/vitamin D3 (CALCIUM 600 WITH VITAMIN D3 ORAL), Take 1 tablet by mouth once daily., Disp: , Rfl:     cetirizine (ZYRTEC) 10 MG tablet, Take 1 tablet (10 mg total) by mouth once daily., Disp: 90 tablet, Rfl: 1    citalopram (CELEXA) 20 MG tablet, Take 1 tablet (20 mg total) by mouth once daily., Disp: 90 tablet, Rfl: 1    clobetasol 0.05% (TEMOVATE) 0.05 % Oint, Apply topically 2 (two) times daily. Apply twice daily for 2 weeks. Then once daily every other day, Disp: 30 g, Rfl: 1    ergocalciferol (ERGOCALCIFEROL) 50,000 unit Cap, Take 1 capsule (50,000 Units total) by mouth every 7 days., Disp: 4 capsule, Rfl: 11    fluticasone propionate (FLONASE) 50 mcg/actuation nasal spray, 1 spray by Each Nostril route once daily., Disp: , Rfl:     furosemide (LASIX) 40 MG tablet, Take 1 tablet (40 mg total) by mouth once daily., Disp: 90 tablet, Rfl: 1    GARLIC ORAL, Take 1 tablet by mouth once daily., Disp: ,  "Rfl:     glipiZIDE 5 MG TR24, Take 1 tablet (5 mg total) by mouth daily with breakfast., Disp: 90 tablet, Rfl: 1    hydroCHLOROthiazide (HYDRODIURIL) 12.5 MG Tab, Take 1-2 tablets (12.5-25 mg total) by mouth once daily., Disp: 180 tablet, Rfl: 1    HYDROcodone-acetaminophen (NORCO) 7.5-325 mg per tablet, Take 1 tablet by mouth every 6 (six) hours as needed for Pain., Disp: 28 tablet, Rfl: 0    JANUMET -1,000 mg TM24, Take 1 tablet by mouth once daily., Disp: 90 tablet, Rfl: 1    lisinopriL (PRINIVIL,ZESTRIL) 40 MG tablet, Take 1 tablet (40 mg total) by mouth once daily., Disp: 90 tablet, Rfl: 1    multivitamin (THERAGRAN) per tablet, Take 1 tablet by mouth once daily., Disp: , Rfl:     olopatadine (PATADAY) 0.2 % Drop, , Disp: , Rfl:     psyllium 0.52 gram capsule, Take 1 capsule (0.52 g total) by mouth 2 (two) times a day., Disp: 180 capsule, Rfl: 1    simvastatin (ZOCOR) 40 MG tablet, Take 1 tablet (40 mg total) by mouth every evening., Disp: 90 tablet, Rfl: 1    ipratropium (ATROVENT) 21 mcg (0.03 %) nasal spray, 2 sprays by Each Nostril route 2 (two) times daily., Disp: 30 mL, Rfl: 0    megestroL (MEGACE) 40 MG Tab, TAKE ONE TABLET BY MOUTH FOUR TIMES DAILY (Patient not taking: Reported on 6/26/2023.), Disp: 120 tablet, Rfl: 2   OBJECTIVE:     Vital Signs   /82 (BP Location: Left arm, Patient Position: Sitting, BP Method: Medium (Manual))   Pulse 89   Temp 98.3 °F (36.8 °C) (Oral)   Resp 18   Ht 5' 10" (1.778 m)   Wt 134.2 kg (295 lb 12.8 oz)   SpO2 98%   BMI 42.44 kg/m²     Physical Exam  Constitutional:       General: She is not in acute distress.     Appearance: Normal appearance. She is not ill-appearing, toxic-appearing or diaphoretic.   HENT:      Head: Normocephalic and atraumatic.      Right Ear: Tympanic membrane normal.      Left Ear: Tympanic membrane normal.      Nose: Congestion present. No rhinorrhea.      Mouth/Throat:      Mouth: Mucous membranes are moist.      Pharynx: No " oropharyngeal exudate or posterior oropharyngeal erythema.   Eyes:      Extraocular Movements: Extraocular movements intact.      Pupils: Pupils are equal, round, and reactive to light.   Cardiovascular:      Rate and Rhythm: Normal rate and regular rhythm.      Pulses: Normal pulses.      Heart sounds: Normal heart sounds. No murmur heard.    No friction rub. No gallop.   Pulmonary:      Effort: Pulmonary effort is normal. No respiratory distress.      Breath sounds: No wheezing, rhonchi or rales.   Abdominal:      General: Abdomen is flat.      Palpations: Abdomen is soft.      Tenderness: There is no abdominal tenderness. There is no guarding or rebound.   Musculoskeletal:         General: Normal range of motion.      Cervical back: Normal range of motion.   Skin:     General: Skin is warm and dry.      Capillary Refill: Capillary refill takes less than 2 seconds.   Neurological:      General: No focal deficit present.      Mental Status: She is alert.   Psychiatric:         Mood and Affect: Mood normal.         Behavior: Behavior normal.       ASSESSMENT/PLAN:     1. Viral URI  Patient's symptoms are consistent with viral URI that is improving.  Patient without signs of bacterial infection at this time.  Patient was tested for COVID/flu per her request and was negative for both.  Patient to continue Zyrtec.  Will prescribe Atrovent nasal spray for symptomatic relief.  Patient to follow-up if symptoms worsen or fail to improve.  Emergency precautions discussed.  Counseled patient on signs of bacterial infection.  Patient to FU if symptoms worsen or fail to improve. Patient verbalized understanding.     -     POCT SARS-COV2 (COVID) with Flu Antigen    Other orders  -     ipratropium (ATROVENT) 21 mcg (0.03 %) nasal spray; 2 sprays by Each Nostril route 2 (two) times daily.  Dispense: 30 mL; Refill: 0        Follow up if symptoms worsen or fail to improve.      TATE MONTOYA MD  06/26/2023

## 2023-06-26 NOTE — PROGRESS NOTES
Reviewed Care Gaps with Pt.   Health Maintenance Due   Topic Date Due    Foot Exam  04/13/2023     Let provider know pt is due for foot exam.

## 2023-06-29 ENCOUNTER — OFFICE VISIT (OUTPATIENT)
Dept: OBSTETRICS AND GYNECOLOGY | Facility: CLINIC | Age: 68
End: 2023-06-29
Payer: COMMERCIAL

## 2023-06-29 VITALS
HEIGHT: 70 IN | HEART RATE: 83 BPM | BODY MASS INDEX: 41.89 KG/M2 | DIASTOLIC BLOOD PRESSURE: 76 MMHG | WEIGHT: 292.63 LBS | SYSTOLIC BLOOD PRESSURE: 159 MMHG

## 2023-06-29 DIAGNOSIS — Z90.710 STATUS POST LAPAROSCOPIC HYSTERECTOMY: Primary | ICD-10-CM

## 2023-06-29 PROCEDURE — 99024 PR POST-OP FOLLOW-UP VISIT: ICD-10-PCS | Mod: ,,, | Performed by: OBSTETRICS & GYNECOLOGY

## 2023-06-29 PROCEDURE — 99024 POSTOP FOLLOW-UP VISIT: CPT | Mod: ,,, | Performed by: OBSTETRICS & GYNECOLOGY

## 2023-06-29 NOTE — PROGRESS NOTES
Subjective:      Patient ID: Saray Jorgensen is a 67 y.o. female.    Chief Complaint:  Post-op Evaluation (4 week follow up from Hysterectomy)      History of Present Illness  HPI  Postoperative Follow-up  Patient presents to the clinic 4 weeks status post total laparoscopic hysterectomy for  postmenopausal bleeding . Eating a regular diet with difficulty. Bowel movements are normal. The patient is not having any pain.    GYN & OB History  No LMP recorded. Patient has had a hysterectomy.   Date of Last Pap: No result found    OB History    Para Term  AB Living   5 5 5         SAB IAB Ectopic Multiple Live Births                  # Outcome Date GA Lbr Franky/2nd Weight Sex Delivery Anes PTL Lv   5 Term            4 Term            3 Term            2 Term            1 Term                Review of Systems  Review of Systems   Constitutional:  Negative for activity change, appetite change, fatigue and unexpected weight change.   HENT: Negative.     Respiratory:  Negative for cough, shortness of breath and wheezing.    Cardiovascular:  Negative for chest pain, palpitations and leg swelling.   Gastrointestinal:  Negative for abdominal pain, bloating, blood in stool, constipation, diarrhea, nausea, vomiting and reflux.   Genitourinary:  Negative for bladder incontinence, decreased libido, dysmenorrhea, dyspareunia, dysuria, flank pain, frequency, hot flashes, menorrhagia, menstrual problem, pelvic pain, urgency, vaginal bleeding, vaginal discharge, urinary incontinence, postcoital bleeding, postmenopausal bleeding, vaginal dryness and vaginal odor.   Musculoskeletal:  Negative for back pain.   Integumentary:  Negative for rash, acne, hair changes, mole/lesion, breast mass, nipple discharge, breast skin changes and breast tenderness.   Neurological:  Negative for headaches.   Psychiatric/Behavioral:  Negative for depression and sleep disturbance. The patient is not nervous/anxious.    Breast: Negative for  asymmetry, breast self exam, lump, mass, nipple discharge, skin changes and tenderness       Objective:     Physical Exam:   Constitutional: She is oriented to person, place, and time. She appears well-developed and well-nourished.    HENT:   Head: Normocephalic and atraumatic.    Eyes: Pupils are equal, round, and reactive to light. EOM are normal.     Cardiovascular:  Normal rate, regular rhythm and normal heart sounds.             Pulmonary/Chest: Effort normal and breath sounds normal.        Abdominal: Soft. Bowel sounds are normal. She exhibits abdominal incision (healing well).             Musculoskeletal: Normal range of motion and moves all extremeties.       Neurological: She is alert and oriented to person, place, and time. She has normal reflexes.     Psychiatric: She has a normal mood and affect. Her behavior is normal. Judgment and thought content normal.       Assessment:     1. Status post laparoscopic hysterectomy               Plan:     Pt may resume normal activity

## 2023-07-24 DIAGNOSIS — E11.9 TYPE 2 DIABETES MELLITUS WITHOUT COMPLICATION, WITHOUT LONG-TERM CURRENT USE OF INSULIN: Primary | ICD-10-CM

## 2023-07-24 DIAGNOSIS — M19.90 ARTHRITIS: Primary | ICD-10-CM

## 2023-07-24 RX ORDER — MELOXICAM 7.5 MG/1
TABLET ORAL
Qty: 90 TABLET | Refills: 1 | Status: SHIPPED | OUTPATIENT
Start: 2023-07-24 | End: 2023-10-05 | Stop reason: SDUPTHER

## 2023-07-24 RX ORDER — MELOXICAM 7.5 MG/1
TABLET ORAL
COMMUNITY
Start: 2023-07-12 | End: 2023-07-24 | Stop reason: SDUPTHER

## 2023-07-26 NOTE — PROGRESS NOTES
Subjective:      Patient ID: Saray Jorgensen is a 67 y.o. female.    Chief Complaint:  Post-op Evaluation (4 week follow up from Hysterectomy)      History of Present Illness  HPI  Postoperative Follow-up  Patient presents to the clinic 4 weeks status post total laparoscopic hysterectomy for  postmenopausal bleeding . Eating a regular diet with difficulty. Bowel movements are normal. The patient is not having any pain.    GYN & OB History  No LMP recorded. Patient has had a hysterectomy.   Date of Last Pap: No result found    OB History    Para Term  AB Living   5 5 5         SAB IAB Ectopic Multiple Live Births                  # Outcome Date GA Lbr Franky/2nd Weight Sex Delivery Anes PTL Lv   5 Term            4 Term            3 Term            2 Term            1 Term                Review of Systems  Review of Systems   Constitutional:  Negative for activity change, appetite change, fatigue and unexpected weight change.   HENT: Negative.     Respiratory:  Negative for cough, shortness of breath and wheezing.    Cardiovascular:  Negative for chest pain, palpitations and leg swelling.   Gastrointestinal:  Negative for abdominal pain, bloating, blood in stool, constipation, diarrhea, nausea, vomiting and reflux.   Genitourinary:  Negative for bladder incontinence, decreased libido, dysmenorrhea, dyspareunia, dysuria, flank pain, frequency, hot flashes, menorrhagia, menstrual problem, pelvic pain, urgency, vaginal bleeding, vaginal discharge, urinary incontinence, postcoital bleeding, postmenopausal bleeding, vaginal dryness and vaginal odor.   Musculoskeletal:  Negative for back pain.   Integumentary:  Negative for rash, acne, hair changes, mole/lesion, breast mass, nipple discharge, breast skin changes and breast tenderness.   Neurological:  Negative for headaches.   Psychiatric/Behavioral:  Negative for depression and sleep disturbance. The patient is not nervous/anxious.    Breast: Negative for  asymmetry, breast self exam, lump, mass, nipple discharge, skin changes and tenderness       Objective:     Physical Exam:   Constitutional: She is oriented to person, place, and time. She appears well-developed and well-nourished.    HENT:   Head: Normocephalic and atraumatic.    Eyes: Pupils are equal, round, and reactive to light. EOM are normal.     Cardiovascular:  Normal rate, regular rhythm and normal heart sounds.             Pulmonary/Chest: Effort normal and breath sounds normal.        Abdominal: Soft. Bowel sounds are normal. She exhibits abdominal incision (healing well).             Musculoskeletal: Normal range of motion and moves all extremeties.       Neurological: She is alert and oriented to person, place, and time. She has normal reflexes.     Psychiatric: She has a normal mood and affect. Her behavior is normal. Judgment and thought content normal.       Assessment:     1. Status post laparoscopic hysterectomy               Plan:      Pt may resume normal activity  [facility] for annual Gyn exam

## 2023-09-06 DIAGNOSIS — E11.9 TYPE 2 DIABETES MELLITUS WITHOUT COMPLICATION, WITHOUT LONG-TERM CURRENT USE OF INSULIN: ICD-10-CM

## 2023-09-07 RX ORDER — GLIPIZIDE 5 MG/1
TABLET, FILM COATED, EXTENDED RELEASE ORAL
Qty: 30 TABLET | Refills: 0 | Status: SHIPPED | OUTPATIENT
Start: 2023-09-07 | End: 2023-10-05 | Stop reason: SDUPTHER

## 2023-10-05 ENCOUNTER — OFFICE VISIT (OUTPATIENT)
Dept: FAMILY MEDICINE | Facility: CLINIC | Age: 68
End: 2023-10-05
Payer: COMMERCIAL

## 2023-10-05 VITALS
WEIGHT: 293 LBS | SYSTOLIC BLOOD PRESSURE: 124 MMHG | RESPIRATION RATE: 18 BRPM | OXYGEN SATURATION: 98 % | DIASTOLIC BLOOD PRESSURE: 76 MMHG | HEIGHT: 70 IN | HEART RATE: 72 BPM | BODY MASS INDEX: 41.95 KG/M2 | TEMPERATURE: 97 F

## 2023-10-05 DIAGNOSIS — I10 ESSENTIAL HYPERTENSION: ICD-10-CM

## 2023-10-05 DIAGNOSIS — M19.90 ARTHRITIS: ICD-10-CM

## 2023-10-05 DIAGNOSIS — J30.1 SEASONAL ALLERGIC RHINITIS DUE TO POLLEN: ICD-10-CM

## 2023-10-05 DIAGNOSIS — F33.42 RECURRENT MAJOR DEPRESSIVE DISORDER, IN FULL REMISSION: ICD-10-CM

## 2023-10-05 DIAGNOSIS — E53.8 VITAMIN B 12 DEFICIENCY: ICD-10-CM

## 2023-10-05 DIAGNOSIS — E11.9 TYPE 2 DIABETES MELLITUS WITHOUT COMPLICATION, WITHOUT LONG-TERM CURRENT USE OF INSULIN: Primary | ICD-10-CM

## 2023-10-05 DIAGNOSIS — J32.1 FRONTAL SINUSITIS, UNSPECIFIED CHRONICITY: ICD-10-CM

## 2023-10-05 DIAGNOSIS — E78.5 HYPERLIPIDEMIA, UNSPECIFIED HYPERLIPIDEMIA TYPE: ICD-10-CM

## 2023-10-05 PROCEDURE — G0008 FLU VACCINE (QUAD) GREATER THAN OR EQUAL TO 3YO PRESERVATIVE FREE IM: ICD-10-PCS | Mod: ,,, | Performed by: NURSE PRACTITIONER

## 2023-10-05 PROCEDURE — 99214 PR OFFICE/OUTPT VISIT, EST, LEVL IV, 30-39 MIN: ICD-10-PCS | Mod: ,,, | Performed by: NURSE PRACTITIONER

## 2023-10-05 PROCEDURE — 90686 IIV4 VACC NO PRSV 0.5 ML IM: CPT | Mod: ,,, | Performed by: NURSE PRACTITIONER

## 2023-10-05 PROCEDURE — 99214 OFFICE O/P EST MOD 30 MIN: CPT | Mod: ,,, | Performed by: NURSE PRACTITIONER

## 2023-10-05 PROCEDURE — 90686 FLU VACCINE (QUAD) GREATER THAN OR EQUAL TO 3YO PRESERVATIVE FREE IM: ICD-10-PCS | Mod: ,,, | Performed by: NURSE PRACTITIONER

## 2023-10-05 PROCEDURE — G0008 ADMIN INFLUENZA VIRUS VAC: HCPCS | Mod: ,,, | Performed by: NURSE PRACTITIONER

## 2023-10-05 RX ORDER — LISINOPRIL 40 MG/1
40 TABLET ORAL DAILY
Qty: 90 TABLET | Refills: 1 | Status: SHIPPED | OUTPATIENT
Start: 2023-10-05 | End: 2024-03-21 | Stop reason: SDUPTHER

## 2023-10-05 RX ORDER — GLIPIZIDE 5 MG/1
TABLET, FILM COATED, EXTENDED RELEASE ORAL
Qty: 90 TABLET | Refills: 1 | Status: SHIPPED | OUTPATIENT
Start: 2023-10-05 | End: 2024-03-21 | Stop reason: SDUPTHER

## 2023-10-05 RX ORDER — SITAGLIPTIN AND METFORMIN HYDROCHLORIDE 1000; 100 MG/1; MG/1
1 TABLET, FILM COATED, EXTENDED RELEASE ORAL DAILY
Qty: 90 TABLET | Refills: 1 | Status: SHIPPED | OUTPATIENT
Start: 2023-10-05 | End: 2024-03-21 | Stop reason: SDUPTHER

## 2023-10-05 RX ORDER — SIMVASTATIN 40 MG/1
40 TABLET, FILM COATED ORAL NIGHTLY
Qty: 90 TABLET | Refills: 1 | Status: SHIPPED | OUTPATIENT
Start: 2023-10-05 | End: 2024-03-21 | Stop reason: SDUPTHER

## 2023-10-05 RX ORDER — CITALOPRAM 20 MG/1
20 TABLET, FILM COATED ORAL DAILY
Qty: 90 TABLET | Refills: 1 | Status: SHIPPED | OUTPATIENT
Start: 2023-10-05 | End: 2024-03-21 | Stop reason: SDUPTHER

## 2023-10-05 RX ORDER — AZITHROMYCIN 250 MG/1
TABLET, FILM COATED ORAL
Qty: 6 TABLET | Refills: 0 | Status: SHIPPED | OUTPATIENT
Start: 2023-10-05 | End: 2023-10-10

## 2023-10-05 RX ORDER — MELOXICAM 7.5 MG/1
TABLET ORAL
Qty: 90 TABLET | Refills: 1 | Status: SHIPPED | OUTPATIENT
Start: 2023-10-05 | End: 2024-03-21 | Stop reason: ALTCHOICE

## 2023-10-05 RX ORDER — HYDROCHLOROTHIAZIDE 12.5 MG/1
12.5-25 TABLET ORAL DAILY
Qty: 180 TABLET | Refills: 1 | Status: SHIPPED | OUTPATIENT
Start: 2023-10-05 | End: 2024-03-21 | Stop reason: SDUPTHER

## 2023-10-05 RX ORDER — FUROSEMIDE 40 MG/1
40 TABLET ORAL DAILY
Qty: 90 TABLET | Refills: 1 | Status: SHIPPED | OUTPATIENT
Start: 2023-10-05 | End: 2024-03-21 | Stop reason: SDUPTHER

## 2023-10-05 RX ORDER — CETIRIZINE HYDROCHLORIDE 10 MG/1
10 TABLET ORAL DAILY
Qty: 90 TABLET | Refills: 1 | Status: SHIPPED | OUTPATIENT
Start: 2023-10-05 | End: 2024-03-21 | Stop reason: SDUPTHER

## 2023-10-05 NOTE — PROGRESS NOTES
Discussed care gaps with pt.   Health Maintenance Due   Topic Date Due    COVID-19 Vaccine (1) Never done    TETANUS VACCINE  Never done    Shingles Vaccine (1 of 2) Never done    Foot Exam  04/13/2023    Diabetes Urine Screening  09/27/2023    Mammogram  11/02/2023     Pt is not interested in any vaccinations other than flu at this time.   Pt has mammogram scheduled for 11/8/23  Pt declined urine screening and foot exam.

## 2023-10-23 NOTE — PROGRESS NOTES
DELFINA Clark   [facility] [address] [place] [address]  [phone]      PATIENT NAME: Saray Jorgensen  : 1955  DATE: 10/5/23  MRN: [id number]      Billing Provider: DELFINA Clark  Level of Service:   Patient PCP Information       Provider PCP Type    DELFINA Clark [facility]            Reason for Visit / Chief Complaint: Follow-up (Pt is here for 6 month f/u ) and Medication Refill (Pt is not fasting.)       Update PCP  Update Chief Complaint         History of Present Illness / Problem Focused Workflow     68 year old female presents for follow up  Denies any problems with current meds  Needing medication refills today    Hx of DM, hypertension, hyperlipidemia, depression, b12 deficiency      Review of Systems     Review of Systems   Constitutional:  Negative for chills, fatigue and fever.   HENT:  Negative for congestion.    Respiratory:  Negative for cough and shortness of breath.    Cardiovascular:  Negative for chest pain and palpitations.   Gastrointestinal:  Negative for abdominal pain, diarrhea and nausea.   Endocrine: Negative for cold intolerance and heat intolerance.   Musculoskeletal:  Positive for arthralgias. Negative for gait problem.   Allergic/Immunologic: Positive for environmental allergies.   Neurological:  Negative for dizziness, weakness and headaches.   Psychiatric/Behavioral:  Negative for agitation and dysphoric mood.        Medical / Social / Family History     Past Medical History:   Diagnosis Date    Colon cancer     Diabetes     Diabetes mellitus, type 2     Gallstones     History of colonoscopy 2014    Hyperlipidemia     Hypertension        Past Surgical History:   Procedure Laterality Date    COLECTOMY, RIGHT      CYSTOSCOPY N/A 2023    Procedure: CYSTOSCOPY;  Surgeon: Niyah King MD;  Location: [facility] OR;  Service: OB/GYN;  Laterality: N/A;    HYSTERECTOMY      HYSTERECTOMY,  TOTAL, LAPAROSCOPIC, WITH SALPINGO-OOPHORECTOMY Bilateral 05/31/2023    Procedure: HYSTERECTOMY,TOTAL,LAPAROSCOPIC,WITH SALPINGO-OOPHORECTOMY;  Surgeon: Niyah King MD;  Location: [facility] [place];  Service: OB/GYN;  Laterality: Bilateral;       Social History  Ms.  reports that she has never smoked. She has never been exposed to tobacco smoke. She has never used smokeless tobacco. She reports that she does not drink alcohol and does not use drugs.    Family History  Ms.'s family history includes Diabetes in her brother, mother, and sister; Hypertension in her brother, mother, and sister; Prostate cancer in her father.    Medications and Allergies     Medications  Outpatient Medications Marked as Taking for the 10/5/23 encounter (Office Visit) with Kailee Garcia FNP   Medication Sig Dispense Refill    ascorbic acid-multivit-min (VITAMIN C ENERGY BOOSTER) 1,000 mg PwEP Take 1 packet by mouth once daily.      ascorbic acid-multivit-min (VITAMIN C FIZZY DRINK) 1,000 mg PwEP Take 1 packet by mouth once daily.      aspirin 325 MG tablet Take 1 tablet (325 mg total) by mouth once daily. 90 tablet 2    calcium carbonate/vitamin D3 (CALCIUM 600 WITH VITAMIN D3 ORAL) Take 1 tablet by mouth once daily.      ergocalciferol (ERGOCALCIFEROL) 50,000 unit Cap Take 1 capsule (50,000 Units total) by mouth every 7 days. 4 capsule 11    GARLIC ORAL Take 1 tablet by mouth once daily.      megestroL (MEGACE) 40 MG Tab TAKE ONE TABLET BY MOUTH FOUR TIMES DAILY 120 tablet 2    multivitamin (THERAGRAN) per tablet Take 1 tablet by mouth once daily.      olopatadine (PATADAY) 0.2 % Drop       [DISCONTINUED] cetirizine (ZYRTEC) 10 MG tablet Take 1 tablet (10 mg total) by mouth once daily. 90 tablet 1    [DISCONTINUED] citalopram (CELEXA) 20 MG tablet Take 1 tablet (20 mg total) by mouth once daily. 90 tablet 1    [DISCONTINUED] furosemide (LASIX) 40 MG tablet Take 1 tablet (40 mg total) by mouth once daily. 90 tablet 1     [DISCONTINUED] glipiZIDE 5 MG TR24 TAKE ONE TABLET BY MOUTH ONCE DAILY WITH BREAKFAST 30 tablet 0    [DISCONTINUED] hydroCHLOROthiazide (HYDRODIURIL) 12.5 MG Tab Take 1-2 tablets (12.5-25 mg total) by mouth once daily. 180 tablet 1    [DISCONTINUED] JANUMET -1,000 mg TM24 Take 1 tablet by mouth once daily. 90 tablet 1    [DISCONTINUED] lisinopriL (PRINIVIL,ZESTRIL) 40 MG tablet Take 1 tablet (40 mg total) by mouth once daily. 90 tablet 1    [DISCONTINUED] meloxicam (MOBIC) 7.5 MG tablet TAKE 1 TABLET BY MOUTH EVERY DAY 90 tablet 1    [DISCONTINUED] simvastatin (ZOCOR) 40 MG tablet Take 1 tablet (40 mg total) by mouth every evening. 90 tablet 1       Allergies  Review of patient's allergies indicates:  No Known Allergies    Physical Examination     Vitals:    10/05/23 1024   BP: 124/76   Pulse: 72   Resp: 18   Temp: 97.3 °F (36.3 °C)     Physical Exam  Constitutional:       General: She is not in acute distress.  HENT:      Head: Normocephalic.      Nose: Nose normal.      Mouth/Throat:      Mouth: Mucous membranes are moist.   Eyes:      Extraocular Movements: Extraocular movements intact.   Cardiovascular:      Rate and Rhythm: Normal rate.   Pulmonary:      Effort: Pulmonary effort is normal. No respiratory distress.   Abdominal:      General: Bowel sounds are normal. There is no distension.      Palpations: Abdomen is soft.   Musculoskeletal:         General: Normal range of motion.      Cervical back: Neck supple.   Skin:     General: Skin is warm.   Neurological:      Mental Status: She is alert and oriented to person, place, and time.   Psychiatric:         Behavior: Behavior normal.           Imaging / Labs     No visits with results within 1 Day(s) from this visit.   Latest known visit with results is:   Lab Visit on 07/24/2023   Component Date Value Ref Range Status    Hemoglobin A1C 07/24/2023 6.7 (H)  4.5 - 6.6 % Final    Estimated Average Glucose 07/24/2023 137  mg/dL Final     Mammo Digital  Screening Bilat  Narrative: Result:   Mammo Digital Screening Bilat     History:  Patient is 67 y.o. and is seen for a screening mammogram.    Films Compared:  Compared to: 10/27/2021 Mammo Digital Screening Bilat (No Change) and   10/21/2020 Mammo Digital Screening Bilat (No Change)     Findings:  The breasts are heterogeneously dense, which may obscure small masses.   There is no evidence of suspicious masses, calcifications, or other   abnormal findings.  Impression: Bilateral  There is no mammographic evidence of malignancy.    BI-RADS Category:   Overall: 1 - Negative       Recommendation:  Routine screening mammogram in 1 year is recommended.    Your estimated lifetime risk of breast cancer (to age 85) based on   Tyrer-Cuzick risk assessment model is Tyrer-Cuzick: 8.44 %. According to   the American Cancer Society, patients with a lifetime breast cancer risk   of 20% or higher might benefit from supplemental screening tests.      Assessment and Plan (including Health Maintenance)      Problem List  Smart Sets  Document Outside HM   :    Health Maintenance Due   Topic Date Due    COVID-19 Vaccine (1) Never done    TETANUS VACCINE  Never done    Shingles Vaccine (1 of 2) Never done    Foot Exam  04/13/2023    Diabetes Urine Screening  09/27/2023    Mammogram  11/02/2023       Problem List Items Addressed This Visit          Psychiatric    Recurrent major depressive disorder, in full remission    Relevant Medications    citalopram (CELEXA) 20 MG tablet       ENT    Seasonal allergic rhinitis due to pollen    Relevant Medications    cetirizine (ZYRTEC) 10 MG tablet       Cardiac/Vascular    Essential hypertension    Current Assessment & Plan     Condition is stable  Return for labs when fasting   Continue current meds          Relevant Medications    furosemide (LASIX) 40 MG tablet    hydroCHLOROthiazide (HYDRODIURIL) 12.5 MG Tab    lisinopriL (PRINIVIL,ZESTRIL) 40 MG tablet    Other Relevant Orders    CBC Auto  Differential    Comprehensive Metabolic Panel    Lipid Panel    Hyperlipidemia    Current Assessment & Plan     Discussed diet and exercise  Continue current meds  Refills today         Relevant Medications    simvastatin (ZOCOR) 40 MG tablet    Other Relevant Orders    CBC Auto Differential    Lipid Panel       Endocrine    Type 2 diabetes mellitus without complication, without long-term current use of insulin - Primary    Current Assessment & Plan     Will get A1C  Med refill  Discussed diet and exercise  Monitor glucose at home  Hemoglobin A1C   Date Value Ref Range Status   07/24/2023 6.7 (H) 4.5 - 6.6 % Final     Comment:       Normal:               <5.7%  Pre-Diabetic:       5.7% to 6.4%  Diabetic:             >6.4%  Diabetic Goal:     <7%   04/20/2023 7.6 (H) 4.5 - 6.6 % Final     Comment:       Normal:               <5.7%  Pre-Diabetic:       5.7% to 6.4%  Diabetic:             >6.4%  Diabetic Goal:     <7%   09/27/2022 6.6 4.5 - 6.6 % Final     Comment:       Normal:               <5.7%  Pre-Diabetic:       5.7% to 6.4%  Diabetic:             >6.4%  Diabetic Goal:     <7%            Relevant Medications    glipiZIDE 5 MG TR24    JANUMET -1,000 mg TM24    Other Relevant Orders    Hemoglobin A1C    CBC Auto Differential    Comprehensive Metabolic Panel    Lipid Panel    Vitamin B 12 deficiency    Relevant Orders    Vitamin B12 & Folate     Other Visit Diagnoses       Arthritis        Relevant Medications    meloxicam (MOBIC) 7.5 MG tablet    Frontal sinusitis, unspecified chronicity                Health Maintenance Topics with due status: Not Due       Topic Last Completion Date    Colorectal Cancer Screening 12/18/2015    DEXA Scan 04/21/2022    Eye Exam 03/15/2023    Lipid Panel 04/20/2023    Hemoglobin A1c 07/24/2023    Low Dose Statin 10/05/2023       Future Appointments   Date Time Provider Department Center   11/8/2023 10:00 AM [facility]   4/4/2024  9:20 AM Rad[facility]  [facility]   5/15/2024  1:30 PM Niyah King MD [facility]          Signature:  DELFINA Clark  [facility] [address] [place] [address]  [phone]    Date of encounter: 10/5/23

## 2023-10-23 NOTE — ASSESSMENT & PLAN NOTE
Will get A1C  Med refill  Discussed diet and exercise  Monitor glucose at home  Hemoglobin A1C   Date Value Ref Range Status   07/24/2023 6.7 (H) 4.5 - 6.6 % Final     Comment:       Normal:               <5.7%  Pre-Diabetic:       5.7% to 6.4%  Diabetic:             >6.4%  Diabetic Goal:     <7%   04/20/2023 7.6 (H) 4.5 - 6.6 % Final     Comment:       Normal:               <5.7%  Pre-Diabetic:       5.7% to 6.4%  Diabetic:             >6.4%  Diabetic Goal:     <7%   09/27/2022 6.6 4.5 - 6.6 % Final     Comment:       Normal:               <5.7%  Pre-Diabetic:       5.7% to 6.4%  Diabetic:             >6.4%  Diabetic Goal:     <7%

## 2023-11-08 ENCOUNTER — HOSPITAL ENCOUNTER (OUTPATIENT)
Dept: RADIOLOGY | Facility: HOSPITAL | Age: 68
Discharge: HOME | End: 2023-11-08
Attending: FAMILY MEDICINE
Payer: COMMERCIAL

## 2023-11-08 VITALS — WEIGHT: 212 LBS | HEIGHT: 70 IN | BODY MASS INDEX: 30.35 KG/M2

## 2023-11-08 DIAGNOSIS — Z12.31 BREAST CANCER SCREENING BY MAMMOGRAM: ICD-10-CM

## 2023-11-08 PROCEDURE — 77067 SCR MAMMO BI INCL CAD: CPT | Mod: TC

## 2024-01-24 DIAGNOSIS — E53.8 VITAMIN B 12 DEFICIENCY: ICD-10-CM

## 2024-01-24 RX ORDER — ERGOCALCIFEROL 1.25 MG/1
50000 CAPSULE ORAL
Qty: 4 CAPSULE | Refills: 11 | Status: SHIPPED | OUTPATIENT
Start: 2024-01-24 | End: 2024-03-21 | Stop reason: SDUPTHER

## 2024-03-18 LAB
LEFT EYE DM RETINOPATHY: NEGATIVE
RIGHT EYE DM RETINOPATHY: NEGATIVE

## 2024-03-21 ENCOUNTER — OFFICE VISIT (OUTPATIENT)
Dept: FAMILY MEDICINE | Facility: CLINIC | Age: 69
End: 2024-03-21
Payer: COMMERCIAL

## 2024-03-21 VITALS
HEART RATE: 90 BPM | DIASTOLIC BLOOD PRESSURE: 86 MMHG | WEIGHT: 293 LBS | RESPIRATION RATE: 18 BRPM | BODY MASS INDEX: 41.95 KG/M2 | HEIGHT: 70 IN | SYSTOLIC BLOOD PRESSURE: 124 MMHG | TEMPERATURE: 97 F | OXYGEN SATURATION: 98 %

## 2024-03-21 DIAGNOSIS — F33.42 RECURRENT MAJOR DEPRESSIVE DISORDER, IN FULL REMISSION: ICD-10-CM

## 2024-03-21 DIAGNOSIS — J30.1 SEASONAL ALLERGIC RHINITIS DUE TO POLLEN: ICD-10-CM

## 2024-03-21 DIAGNOSIS — M16.11 ARTHRITIS OF RIGHT HIP: Primary | ICD-10-CM

## 2024-03-21 DIAGNOSIS — M17.0 OSTEOARTHRITIS OF BOTH KNEES, UNSPECIFIED OSTEOARTHRITIS TYPE: ICD-10-CM

## 2024-03-21 DIAGNOSIS — E78.5 HYPERLIPIDEMIA, UNSPECIFIED HYPERLIPIDEMIA TYPE: ICD-10-CM

## 2024-03-21 DIAGNOSIS — I10 ESSENTIAL HYPERTENSION: ICD-10-CM

## 2024-03-21 DIAGNOSIS — E11.9 TYPE 2 DIABETES MELLITUS WITHOUT COMPLICATION, WITHOUT LONG-TERM CURRENT USE OF INSULIN: ICD-10-CM

## 2024-03-21 DIAGNOSIS — E53.8 VITAMIN B 12 DEFICIENCY: ICD-10-CM

## 2024-03-21 PROCEDURE — 96372 THER/PROPH/DIAG INJ SC/IM: CPT | Mod: ,,, | Performed by: NURSE PRACTITIONER

## 2024-03-21 PROCEDURE — 99213 OFFICE O/P EST LOW 20 MIN: CPT | Mod: 25,,, | Performed by: NURSE PRACTITIONER

## 2024-03-21 RX ORDER — CITALOPRAM 20 MG/1
20 TABLET, FILM COATED ORAL DAILY
Qty: 90 TABLET | Refills: 1 | Status: SHIPPED | OUTPATIENT
Start: 2024-03-21

## 2024-03-21 RX ORDER — GLIPIZIDE 5 MG/1
TABLET, FILM COATED, EXTENDED RELEASE ORAL
Qty: 90 TABLET | Refills: 1 | Status: SHIPPED | OUTPATIENT
Start: 2024-03-21

## 2024-03-21 RX ORDER — DEXAMETHASONE SODIUM PHOSPHATE 4 MG/ML
4 INJECTION, SOLUTION INTRA-ARTICULAR; INTRALESIONAL; INTRAMUSCULAR; INTRAVENOUS; SOFT TISSUE
Status: COMPLETED | OUTPATIENT
Start: 2024-03-21 | End: 2024-03-21

## 2024-03-21 RX ORDER — LISINOPRIL 40 MG/1
40 TABLET ORAL DAILY
Qty: 90 TABLET | Refills: 1 | Status: SHIPPED | OUTPATIENT
Start: 2024-03-21 | End: 2025-03-21

## 2024-03-21 RX ORDER — ERGOCALCIFEROL 1.25 MG/1
50000 CAPSULE ORAL
Qty: 13 CAPSULE | Refills: 1 | Status: SHIPPED | OUTPATIENT
Start: 2024-03-21 | End: 2025-03-21

## 2024-03-21 RX ORDER — SITAGLIPTIN AND METFORMIN HYDROCHLORIDE 1000; 100 MG/1; MG/1
1 TABLET, FILM COATED, EXTENDED RELEASE ORAL DAILY
Qty: 90 TABLET | Refills: 1 | Status: SHIPPED | OUTPATIENT
Start: 2024-03-21

## 2024-03-21 RX ORDER — METHYLPREDNISOLONE ACETATE 40 MG/ML
40 INJECTION, SUSPENSION INTRA-ARTICULAR; INTRALESIONAL; INTRAMUSCULAR; SOFT TISSUE
Status: COMPLETED | OUTPATIENT
Start: 2024-03-21 | End: 2024-03-21

## 2024-03-21 RX ORDER — FUROSEMIDE 40 MG/1
40 TABLET ORAL DAILY
Qty: 90 TABLET | Refills: 1 | Status: SHIPPED | OUTPATIENT
Start: 2024-03-21

## 2024-03-21 RX ORDER — CELECOXIB 100 MG/1
100 CAPSULE ORAL DAILY
Qty: 30 CAPSULE | Refills: 2 | Status: SHIPPED | OUTPATIENT
Start: 2024-03-21 | End: 2024-05-16

## 2024-03-21 RX ORDER — HYDROCHLOROTHIAZIDE 12.5 MG/1
12.5-25 TABLET ORAL DAILY
Qty: 180 TABLET | Refills: 1 | Status: SHIPPED | OUTPATIENT
Start: 2024-03-21 | End: 2024-09-17

## 2024-03-21 RX ORDER — CETIRIZINE HYDROCHLORIDE 10 MG/1
10 TABLET ORAL DAILY
Qty: 90 TABLET | Refills: 1 | Status: SHIPPED | OUTPATIENT
Start: 2024-03-21

## 2024-03-21 RX ORDER — SIMVASTATIN 40 MG/1
40 TABLET, FILM COATED ORAL NIGHTLY
Qty: 90 TABLET | Refills: 1 | Status: SHIPPED | OUTPATIENT
Start: 2024-03-21 | End: 2024-09-17

## 2024-03-21 RX ADMIN — METHYLPREDNISOLONE ACETATE 40 MG: 40 INJECTION, SUSPENSION INTRA-ARTICULAR; INTRALESIONAL; INTRAMUSCULAR; SOFT TISSUE at 11:03

## 2024-03-21 RX ADMIN — DEXAMETHASONE SODIUM PHOSPHATE 4 MG: 4 INJECTION, SOLUTION INTRA-ARTICULAR; INTRALESIONAL; INTRAMUSCULAR; INTRAVENOUS; SOFT TISSUE at 11:03

## 2024-03-21 NOTE — ASSESSMENT & PLAN NOTE
Lab Results   Component Value Date    CHOL 167 01/12/2024    CHOL 139 04/20/2023    CHOL 169 09/27/2022     Lab Results   Component Value Date    HDL 68 (H) 01/12/2024    HDL 49 04/20/2023    HDL 65 (H) 09/27/2022     Lab Results   Component Value Date    LDLCALC 83 01/12/2024    LDLCALC 71 04/20/2023    LDLCALC 85 09/27/2022     Lab Results   Component Value Date    TRIG 78 01/12/2024    TRIG 93 04/20/2023    TRIG 95 09/27/2022       Lab Results   Component Value Date    CHOLHDL 2.5 01/12/2024    CHOLHDL 2.8 04/20/2023    CHOLHDL 2.6 09/27/2022      Will get fasting labs next visit  Continue current plan

## 2024-03-21 NOTE — ASSESSMENT & PLAN NOTE
Complaints of chronic pain to bilat knees and right hip  Unable to get x-ray in clinic due to weight  Declines going to [place] for x-ray  Discussed risks/benefits/alternatives for treatment  Depomedrol, decadron IM  D/c mobic, start  celebrex po daily

## 2024-03-21 NOTE — PROGRESS NOTES
DELFINA Clark   [facility] [address] [address]  [phone]      PATIENT NAME: Saray Jorgensen  : 1955  DATE: 3/21/24  MRN: [id number]      Billing Provider: DELFINA Clark  Level of Service:   Patient PCP Information       Provider PCP Type    DELFINA Clark [facility]            Reason for Visit / Chief Complaint: Hip Pain (Chronic./Denies injury./Describes as sharp shooting pains./Pain w/ ROM and weightbearing./No xrays in chart.) and Knee Pain (Chronic margy knee pain./Denies injury./Describes as an ache./Pain w/ ROM and weightbearing./No xrays in chart./Usually relieved with tylenol arthritis and voltaren gel.)       Update PCP  Update Chief Complaint         History of Present Illness / Problem Focused Workflow     68 year old female presents with complaints of right hip and bilat knee pain  States this is a chronic problem  Unable to get x-rays in clinic due to weight      Review of Systems     Review of Systems   Constitutional:  Negative for fatigue and fever.   HENT:  Negative for congestion.    Respiratory:  Negative for cough and shortness of breath.    Cardiovascular:  Negative for palpitations.   Gastrointestinal:  Negative for abdominal pain, constipation and diarrhea.   Endocrine: Negative for polydipsia and polyuria.   Musculoskeletal:  Positive for arthralgias and myalgias. Negative for gait problem.   Neurological:  Negative for dizziness, weakness and headaches.   Psychiatric/Behavioral:  Negative for agitation and dysphoric mood.        Medical / Social / Family History     Past Medical History:   Diagnosis Date    Colon cancer     Diabetes     Diabetes mellitus, type 2     Gallstones     History of colonoscopy 2014    Hyperlipidemia     Hypertension        Past Surgical History:   Procedure Laterality Date    COLECTOMY, RIGHT      CYSTOSCOPY N/A 2023    Procedure: CYSTOSCOPY;  Surgeon: Niyah  MD Christine;  Location: [facility] OR;  Service: OB/GYN;  Laterality: N/A;    HYSTERECTOMY      HYSTERECTOMY, TOTAL, LAPAROSCOPIC, WITH SALPINGO-OOPHORECTOMY Bilateral 05/31/2023    Procedure: HYSTERECTOMY,TOTAL,LAPAROSCOPIC,WITH SALPINGO-OOPHORECTOMY;  Surgeon: Niyah King MD;  Location: [facility] OR;  Service: OB/GYN;  Laterality: Bilateral;       Social History  Ms.  reports that she has never smoked. She has never been exposed to tobacco smoke. She has never used smokeless tobacco. She reports that she does not drink alcohol and does not use drugs.    Family History  Ms.'s family history includes Diabetes in her brother, mother, and sister; Hypertension in her brother, mother, and sister; Prostate cancer in her father.    Medications and Allergies     Medications  Outpatient Medications Marked as Taking for the 3/21/24 encounter (Office Visit) with Kailee Garcia FNP   Medication Sig Dispense Refill    ascorbic acid-multivit-min (VITAMIN C ENERGY BOOSTER) 1,000 mg PwEP Take 1 packet by mouth once daily.      ascorbic acid-multivit-min (VITAMIN C FIZZY DRINK) 1,000 mg PwEP Take 1 packet by mouth once daily.      aspirin 325 MG tablet Take 1 tablet (325 mg total) by mouth once daily. 90 tablet 2    fluticasone propionate (FLONASE) 50 mcg/actuation nasal spray 1 spray by Each Nostril route once daily.      GARLIC ORAL Take 1 tablet by mouth once daily.      multivitamin (THERAGRAN) per tablet Take 1 tablet by mouth once daily.      olopatadine (PATADAY) 0.2 % Drop       [DISCONTINUED] cetirizine (ZYRTEC) 10 MG tablet Take 1 tablet (10 mg total) by mouth once daily. 90 tablet 1    [DISCONTINUED] citalopram (CELEXA) 20 MG tablet Take 1 tablet (20 mg total) by mouth once daily. 90 tablet 1    [DISCONTINUED] ergocalciferol (ERGOCALCIFEROL) 50,000 unit Cap Take 1 capsule (50,000 Units total) by mouth every 7 days. 4 capsule 11    [DISCONTINUED] furosemide (LASIX) 40 MG tablet Take 1 tablet (40 mg total) by  mouth once daily. 90 tablet 1    [DISCONTINUED] glipiZIDE 5 MG TR24 TAKE ONE TABLET BY MOUTH ONCE DAILY WITH BREAKFAST 90 tablet 1    [DISCONTINUED] hydroCHLOROthiazide (HYDRODIURIL) 12.5 MG Tab Take 1-2 tablets (12.5-25 mg total) by mouth once daily. 180 tablet 1    [DISCONTINUED] JANUMET -1,000 mg TM24 Take 1 tablet by mouth once daily. 90 tablet 1    [DISCONTINUED] lisinopriL (PRINIVIL,ZESTRIL) 40 MG tablet Take 1 tablet (40 mg total) by mouth once daily. 90 tablet 1    [DISCONTINUED] megestroL (MEGACE) 40 MG Tab TAKE ONE TABLET BY MOUTH FOUR TIMES DAILY 120 tablet 2    [DISCONTINUED] meloxicam (MOBIC) 7.5 MG tablet TAKE 1 TABLET BY MOUTH EVERY DAY 90 tablet 1    [DISCONTINUED] simvastatin (ZOCOR) 40 MG tablet Take 1 tablet (40 mg total) by mouth every evening. 90 tablet 1     Current Facility-Administered Medications for the 3/21/24 encounter (Office Visit) with Kailee Garcia FNP   Medication Dose Route Frequency Provider Last Rate Last Admin    [COMPLETED] dexAMETHasone injection 4 mg  4 mg Intramuscular 1 time in Clinic/HOD Kailee Garcia FNP   4 mg at 03/21/24 1121    [COMPLETED] methylPREDNISolone acetate injection 40 mg  40 mg Intramuscular 1 time in Clinic/HOD Kailee Garcia FNP   40 mg at 03/21/24 1121       Allergies  Review of patient's allergies indicates:  No Known Allergies    Physical Examination     Vitals:    03/21/24 1045   BP: 124/86   Pulse: 90   Resp: 18   Temp: 97.2 °F (36.2 °C)     Physical Exam  Constitutional:       General: She is not in acute distress.     Appearance: She is obese.   HENT:      Head: Normocephalic.      Nose: Nose normal.      Mouth/Throat:      Mouth: Mucous membranes are moist.   Eyes:      Extraocular Movements: Extraocular movements intact.   Cardiovascular:      Rate and Rhythm: Normal rate.   Pulmonary:      Effort: Pulmonary effort is normal. No respiratory distress.   Abdominal:      General: Bowel sounds are normal.      Palpations: Abdomen is soft.    Musculoskeletal:         General: No tenderness or signs of injury. Normal range of motion.      Cervical back: Normal range of motion.   Skin:     General: Skin is warm.   Neurological:      Mental Status: She is alert.   Psychiatric:         Behavior: Behavior normal.           Imaging / Labs     No visits with results within 1 Day(s) from this visit.   Latest known visit with results is:   Lab Visit on 01/12/2024   Component Date Value Ref Range Status    Hemoglobin A1C 01/12/2024 6.3  4.5 - 6.6 % Final    Estimated Average Glucose 01/12/2024 134  mg/dL Final    Sodium 01/12/2024 137  136 - 145 mmol/L Final    Potassium 01/12/2024 5.0  3.5 - 5.1 mmol/L Final    Chloride 01/12/2024 106  98 - 107 mmol/L Final    CO2 01/12/2024 30  21 - 32 mmol/L Final    Anion Gap 01/12/2024 6 (L)  7 - 16 mmol/L Final    Glucose 01/12/2024 207 (H)  74 - 106 mg/dL Final    BUN 01/12/2024 20 (H)  7 - 18 mg/dL Final    Creatinine 01/12/2024 0.89  0.55 - 1.02 mg/dL Final    BUN/Creatinine Ratio 01/12/2024 22 (H)  6 - 20 Final    Calcium 01/12/2024 9.9  8.5 - 10.1 mg/dL Final    Total Protein 01/12/2024 7.9  6.4 - 8.2 g/dL Final    Albumin 01/12/2024 3.4 (L)  3.5 - 5.0 g/dL Final    Globulin 01/12/2024 4.5 (H)  2.0 - 4.0 g/dL Final    A/G Ratio 01/12/2024 0.8   Final    Bilirubin, Total 01/12/2024 0.4  >0.0 - 1.2 mg/dL Final    Alk Phos 01/12/2024 77  55 - 142 U/L Final    ALT 01/12/2024 22  13 - 56 U/L Final    AST 01/12/2024 15  15 - 37 U/L Final    eGFR 01/12/2024 71  >=60 mL/min/1.73m2 Final    Triglycerides 01/12/2024 78  35 - 150 mg/dL Final    Cholesterol 01/12/2024 167  0 - 200 mg/dL Final    HDL Cholesterol 01/12/2024 68 (H)  40 - 60 mg/dL Final    Cholesterol/HDL Ratio (Risk Factor) 01/12/2024 2.5   Final    Non-HDL 01/12/2024 99  mg/dL Final    LDL Calculated 01/12/2024 83  mg/dL Final    LDL/HDL 01/12/2024 1.2   Final    VLDL 01/12/2024 16  mg/dL Final    Vitamin B12 01/12/2024 804  193 - 986 pg/mL Final    Folate  01/12/2024 >20.0 (H)  3.1 - 17.5 ng/mL Final    WBC 01/12/2024 7.10  4.50 - 11.00 K/uL Final    RBC 01/12/2024 3.85 (L)  4.20 - 5.40 M/uL Final    Hemoglobin 01/12/2024 11.8 (L)  12.0 - 16.0 g/dL Final    Hematocrit 01/12/2024 37.4 (L)  38.0 - 47.0 % Final    MCV 01/12/2024 97.1 (H)  80.0 - 96.0 fL Final    MCH 01/12/2024 30.6  27.0 - 31.0 pg Final    MCHC 01/12/2024 31.6 (L)  32.0 - 36.0 g/dL Final    RDW 01/12/2024 12.3  11.5 - 14.5 % Final    Platelet Count 01/12/2024 247  150 - 400 K/uL Final    MPV 01/12/2024 11.2  9.4 - 12.4 fL Final    Neutrophils % 01/12/2024 50.1 (L)  53.0 - 65.0 % Final    Lymphocytes % 01/12/2024 37.6  27.0 - 41.0 % Final    Monocytes % 01/12/2024 5.5  2.0 - 6.0 % Final    Eosinophils % 01/12/2024 5.8 (H)  1.0 - 4.0 % Final    Basophils % 01/12/2024 0.7  0.0 - 1.0 % Final    Immature Granulocytes % 01/12/2024 0.3  0.0 - 0.4 % Final    nRBC, Auto 01/12/2024 0.0  <=0.0 % Final    Neutrophils, Abs 01/12/2024 3.56  1.80 - 7.70 K/uL Final    Lymphocytes, Absolute 01/12/2024 2.67  1.00 - 4.80 K/uL Final    Monocytes, Absolute 01/12/2024 0.39  0.00 - 0.80 K/uL Final    Eosinophils, Absolute 01/12/2024 0.41  0.00 - 0.50 K/uL Final    Basophils, Absolute 01/12/2024 0.05  0.00 - 0.20 K/uL Final    Immature Granulocytes, Absolute 01/12/2024 0.02  0.00 - 0.04 K/uL Final    nRBC, Absolute 01/12/2024 0.00  <=0.00 x10e3/uL Final    Diff Type 01/12/2024 Auto   Final     Mammo Digital Screening Bilat w/ Prieto  Narrative: Result:   Mammo Digital Screening Bilat w/ Prieto     History:  Patient is 68 y.o. and is seen for a screening mammogram.    Films Compared:  Compared to: 11/02/2022 Mammo Digital Screening Bilat, 10/27/2021 Mammo   Digital Screening Bilat, and 10/21/2020 Mammo Digital Screening Bilat     Findings:  This procedure was performed using tomosynthesis. Computer-aided detection   was utilized in the interpretation of this examination.  The breasts are heterogeneously dense, which may obscure  small masses.   There is no evidence of suspicious masses, calcifications, or other   abnormal findings.  Impression: Bilateral  There is no mammographic evidence of malignancy.    BI-RADS Category:   Overall: 2 - Benign       Recommendation:  Routine screening mammogram in 1 year is recommended.    Your estimated lifetime risk of breast cancer (to age 85) based on   Tyrer-Cuzick risk assessment model is Tyrer-Cuzick: 6.61 %. According to   the American Cancer Society, patients with a lifetime breast cancer risk   of 20% or higher might benefit from supplemental screening tests.      Assessment and Plan (including Health Maintenance)      Problem List  Smart Sets  Document Outside HM   :    Health Maintenance Due   Topic Date Due    COVID-19 Vaccine (1) Never done    TETANUS VACCINE  Never done    Shingles Vaccine (1 of 2) Never done    RSV Vaccine (Age 60+ and Pregnant patients) (1 - 1-dose 60+ series) Never done    Foot Exam  04/13/2023    Diabetes Urine Screening  09/27/2023    Eye Exam  03/15/2024       Problem List Items Addressed This Visit          Psychiatric    Recurrent major depressive disorder, in full remission    Relevant Medications    citalopram (CELEXA) 20 MG tablet       ENT    Seasonal allergic rhinitis due to pollen    Relevant Medications    cetirizine (ZYRTEC) 10 MG tablet       Cardiac/Vascular    Essential hypertension    Current Assessment & Plan     Condition is stable  Will get labs next visit  Continue current meds          Relevant Medications    lisinopriL (PRINIVIL,ZESTRIL) 40 MG tablet    furosemide (LASIX) 40 MG tablet    hydroCHLOROthiazide (HYDRODIURIL) 12.5 MG Tab    Hyperlipidemia    Current Assessment & Plan     Lab Results   Component Value Date    CHOL 167 01/12/2024    CHOL 139 04/20/2023    CHOL 169 09/27/2022     Lab Results   Component Value Date    HDL 68 (H) 01/12/2024    HDL 49 04/20/2023    HDL 65 (H) 09/27/2022     Lab Results   Component Value Date    LDLCALC 83  01/12/2024    LDLCALC 71 04/20/2023    LDLCALC 85 09/27/2022     Lab Results   Component Value Date    TRIG 78 01/12/2024    TRIG 93 04/20/2023    TRIG 95 09/27/2022     Lab Results   Component Value Date    CHOLHDL 2.5 01/12/2024    CHOLHDL 2.8 04/20/2023    CHOLHDL 2.6 09/27/2022      Will get fasting labs next visit  Continue current plan         Relevant Medications    simvastatin (ZOCOR) 40 MG tablet       Endocrine    Type 2 diabetes mellitus without complication, without long-term current use of insulin    Relevant Medications    glipiZIDE 5 MG TR24    JANUMET -1,000 mg TM24    Vitamin B 12 deficiency    Relevant Medications    ergocalciferol (ERGOCALCIFEROL) 50,000 unit Cap       Orthopedic    Arthritis of right hip - Primary    Relevant Medications    methylPREDNISolone acetate injection 40 mg (Completed)    dexAMETHasone injection 4 mg (Completed)    celecoxib (CELEBREX) 100 MG capsule    Osteoarthritis of both knees    Current Assessment & Plan     Complaints of chronic pain to bilat knees and right hip  Unable to get x-ray in clinic due to weight  Declines going to [place] for x-ray  Discussed risks/benefits/alternatives for treatment  Depomedrol, decadron IM  D/c mobic, start  celebrex po daily           Relevant Medications    methylPREDNISolone acetate injection 40 mg (Completed)    dexAMETHasone injection 4 mg (Completed)    celecoxib (CELEBREX) 100 MG capsule       Health Maintenance Topics with due status: Not Due       Topic Last Completion Date    Colorectal Cancer Screening 12/18/2015    DEXA Scan 04/21/2022    Mammogram 11/08/2023    Lipid Panel 01/12/2024    Hemoglobin A1c 01/12/2024    Low Dose Statin 03/21/2024       Future Appointments   Date Time Provider Department Center   5/15/2024  1:30 PM Niyah King MD [facility]   6/20/2024 10:40 AM Kailee Garcia FNP [facility]   11/13/2024  1:20 PM [facility] [place]           Signature:  DELFINA Clark  [facility] [address] [place] [address]  [phone]    Date of encounter: 3/21/24

## 2024-03-21 NOTE — PROGRESS NOTES
Health Maintenance Due   Topic Date Due    COVID-19 Vaccine (1) Never done    TETANUS VACCINE  Never done    Shingles Vaccine (1 of 2) Never done    RSV Vaccine (Age 60+ and Pregnant patients) (1 - 1-dose 60+ series) Never done    Foot Exam  04/13/2023    Diabetes Urine Screening  09/27/2023    Eye Exam  03/15/2024     Discussed care gaps.  Not interested in vaccs/foot exam/eye exam/screening.

## 2024-04-01 ENCOUNTER — PATIENT OUTREACH (OUTPATIENT)
Dept: ADMINISTRATIVE | Facility: HOSPITAL | Age: 69
End: 2024-04-01

## 2024-04-01 NOTE — PROGRESS NOTES
Population Health Chart Review & Patient Outreach Details      Further Action Needed If Patient Returns Outreach:      24 upload recent eye exam [facility] note to health maintenance      Updates Requested / Reviewed:     []  Care Everywhere    []     []  External Sources ([facility], [facility], [facility], etc.)    [] [facility]   [] [facility]   [] Other:    []  Care Team Updated   []  Removed  or Duplicate Orders   []  Immunization Reconciliation Completed / Queried    [] [place]   [] [place]   [] [place]   [] [place]      Health Maintenance Topics Addressed and Outreach Outcomes / Actions Taken:             Breast Cancer Screening []  Mammogram Order Placed    []  Mammogram Screening Scheduled    []  External Records Requested & Care Team Updated if Applicable    []  External Records Uploaded & Care Team Updated if Applicable    []  Pt Declined Scheduling Mammogram    []  Pt Will Schedule with External Provider / Order Routed & Care Team Updated if Applicable              Cervical Cancer Screening []  Pap Smear Scheduled in Primary Care or OBGYN    []  External Records Requested & Care Team Updated if Applicable       []  External Records Uploaded, Care Team Updated, & History Updated if Applicable    []  Patient Declined Scheduling Pap Smear    []  Patient Will Schedule with External Provider & Care Team Updated if Applicable                  Colorectal Cancer Screening []  Colonoscopy Case Request / Referral / Home Test Order Placed    []  External Records Requested & Care Team Updated if Applicable    []  External Records Uploaded, Care Team Updated, & History Updated if Applicable    []  Patient Declined Completing Colon Cancer Screening    []  Patient Will Schedule with External Provider & Care Team Updated if Applicable    []  Fit Kit Mailed (add the SmartPhrase under additional notes)    []  Reminded Patient to Complete Home Test                Diabetic Eye Exam []  Eye Exam  Screening Order Placed    []  Eye Camera Scheduled or Optometry/Ophthalmology Referral Placed    []  External Records Requested & Care Team Updated if Applicable    [x]  External Records Uploaded, Care Team Updated, & History Updated if Applicable    []  Patient Declined Scheduling Eye Exam    []  Patient Will Schedule with External Provider & Care Team Updated if Applicable             Blood Pressure Control []  Primary Care Follow Up Visit Scheduled     []  Remote Blood Pressure Reading Captured    []  Patient Declined Remote Reading or Scheduling Appt - Escalated to PCP    []  Patient Will Call Back or Send Portal Message with Reading                 HbA1c & Other Labs []  Overdue Lab(s) Ordered    []  Overdue Lab(s) Scheduled    []  External Records Uploaded & Care Team Updated if Applicable    []  Primary Care Follow Up Visit Scheduled     []  Reminded Patient to Complete A1c Home Test    []  Patient Declined Scheduling Labs or Will Call Back to Schedule    []  Patient Will Schedule with External Provider / Order Routed, & Care Team Updated if Applicable           Primary Care Appointment []  Primary Care Appt Scheduled    []  Patient Declined Scheduling or Will Call Back to Schedule    []  Pt Established with External Provider, Updated Care Team, & Informed Pt to Notify Payor if Applicable           Medication Adherence /    Statin Use []  Primary Care Appointment Scheduled    []  Patient Reminded to  Prescription    []  Patient Declined, Provider Notified if Needed    []  Sent Provider Message to Review to Evaluate Pt for Statin, Add Exclusion Dx Codes, Document   Exclusion in Problem List, Change Statin Intensity Level to Moderate or High Intensity if Applicable                Osteoporosis Screening []  Dexa Order Placed    []  Dexa Appointment Scheduled    []  External Records Requested & Care Team Updated    []  External Records Uploaded, Care Team Updated, & History Updated if Applicable    []   Patient Declined Scheduling Dexa or Will Call Back to Schedule    []  Patient Will Schedule with External Provider / Order Routed & Care Team Updated if Applicable       Additional Notes:

## 2024-05-16 DIAGNOSIS — M16.11 ARTHRITIS OF RIGHT HIP: ICD-10-CM

## 2024-05-16 DIAGNOSIS — M17.0 OSTEOARTHRITIS OF BOTH KNEES, UNSPECIFIED OSTEOARTHRITIS TYPE: ICD-10-CM

## 2024-05-16 RX ORDER — CELECOXIB 100 MG/1
100 CAPSULE ORAL
Qty: 30 CAPSULE | Refills: 2 | Status: SHIPPED | OUTPATIENT
Start: 2024-05-16

## 2024-06-20 ENCOUNTER — OFFICE VISIT (OUTPATIENT)
Dept: FAMILY MEDICINE | Facility: CLINIC | Age: 69
End: 2024-06-20
Payer: COMMERCIAL

## 2024-06-20 VITALS
HEART RATE: 85 BPM | BODY MASS INDEX: 41.86 KG/M2 | OXYGEN SATURATION: 96 % | DIASTOLIC BLOOD PRESSURE: 80 MMHG | RESPIRATION RATE: 18 BRPM | SYSTOLIC BLOOD PRESSURE: 124 MMHG | WEIGHT: 292.38 LBS | TEMPERATURE: 97 F | HEIGHT: 70 IN

## 2024-06-20 DIAGNOSIS — F33.42 RECURRENT MAJOR DEPRESSIVE DISORDER, IN FULL REMISSION: ICD-10-CM

## 2024-06-20 DIAGNOSIS — M16.11 ARTHRITIS OF RIGHT HIP: ICD-10-CM

## 2024-06-20 DIAGNOSIS — E11.9 TYPE 2 DIABETES MELLITUS WITHOUT COMPLICATION, WITHOUT LONG-TERM CURRENT USE OF INSULIN: ICD-10-CM

## 2024-06-20 DIAGNOSIS — E78.5 HYPERLIPIDEMIA, UNSPECIFIED HYPERLIPIDEMIA TYPE: ICD-10-CM

## 2024-06-20 DIAGNOSIS — M17.0 OSTEOARTHRITIS OF BOTH KNEES, UNSPECIFIED OSTEOARTHRITIS TYPE: ICD-10-CM

## 2024-06-20 DIAGNOSIS — I10 ESSENTIAL HYPERTENSION: Primary | ICD-10-CM

## 2024-06-20 DIAGNOSIS — E53.8 VITAMIN B 12 DEFICIENCY: ICD-10-CM

## 2024-06-20 DIAGNOSIS — E66.01 CLASS 3 SEVERE OBESITY WITH BODY MASS INDEX (BMI) OF 40.0 TO 44.9 IN ADULT, UNSPECIFIED OBESITY TYPE, UNSPECIFIED WHETHER SERIOUS COMORBIDITY PRESENT: ICD-10-CM

## 2024-06-20 LAB
25(OH)D3 SERPL-MCNC: 83.4 NG/ML
ALBUMIN SERPL BCP-MCNC: 3.5 G/DL (ref 3.5–5)
ALBUMIN/GLOB SERPL: 0.8 {RATIO}
ALP SERPL-CCNC: 78 U/L (ref 55–142)
ALT SERPL W P-5'-P-CCNC: 16 U/L (ref 13–56)
ANION GAP SERPL CALCULATED.3IONS-SCNC: 11 MMOL/L (ref 7–16)
AST SERPL W P-5'-P-CCNC: 9 U/L (ref 15–37)
BASOPHILS # BLD AUTO: 0.06 K/UL (ref 0–0.2)
BASOPHILS NFR BLD AUTO: 0.8 % (ref 0–1)
BILIRUB SERPL-MCNC: 0.3 MG/DL (ref ?–1.2)
BUN SERPL-MCNC: 16 MG/DL (ref 7–18)
BUN/CREAT SERPL: 19 (ref 6–20)
CALCIUM SERPL-MCNC: 9.3 MG/DL (ref 8.5–10.1)
CHLORIDE SERPL-SCNC: 103 MMOL/L (ref 98–107)
CHOLEST SERPL-MCNC: 172 MG/DL (ref 0–200)
CHOLEST/HDLC SERPL: 2.6 {RATIO}
CO2 SERPL-SCNC: 28 MMOL/L (ref 21–32)
CREAT SERPL-MCNC: 0.85 MG/DL (ref 0.55–1.02)
CREAT UR-MCNC: 88 MG/DL (ref 28–219)
DIFFERENTIAL METHOD BLD: ABNORMAL
EGFR (NO RACE VARIABLE) (RUSH/TITUS): 75 ML/MIN/1.73M2
EOSINOPHIL # BLD AUTO: 0.37 K/UL (ref 0–0.5)
EOSINOPHIL NFR BLD AUTO: 4.7 % (ref 1–4)
ERYTHROCYTE [DISTWIDTH] IN BLOOD BY AUTOMATED COUNT: 12.4 % (ref 11.5–14.5)
EST. AVERAGE GLUCOSE BLD GHB EST-MCNC: 131 MG/DL
FOLATE SERPL-MCNC: >20 NG/ML (ref 3.1–17.5)
GLOBULIN SER-MCNC: 4.4 G/DL (ref 2–4)
GLUCOSE SERPL-MCNC: 193 MG/DL (ref 74–106)
HBA1C MFR BLD HPLC: 6.2 % (ref 4.5–6.6)
HCT VFR BLD AUTO: 39.4 % (ref 38–47)
HDLC SERPL-MCNC: 66 MG/DL (ref 40–60)
HGB BLD-MCNC: 11.8 G/DL (ref 12–16)
IMM GRANULOCYTES # BLD AUTO: 0.02 K/UL (ref 0–0.04)
IMM GRANULOCYTES NFR BLD: 0.3 % (ref 0–0.4)
LDLC SERPL CALC-MCNC: 87 MG/DL
LDLC/HDLC SERPL: 1.3 {RATIO}
LYMPHOCYTES # BLD AUTO: 2.41 K/UL (ref 1–4.8)
LYMPHOCYTES NFR BLD AUTO: 30.4 % (ref 27–41)
MCH RBC QN AUTO: 29.8 PG (ref 27–31)
MCHC RBC AUTO-ENTMCNC: 29.9 G/DL (ref 32–36)
MCV RBC AUTO: 99.5 FL (ref 80–96)
MICROALBUMIN UR-MCNC: 0.6 MG/DL (ref 0–2.8)
MICROALBUMIN/CREAT RATIO PNL UR: 6.8 MG/G (ref 0–30)
MONOCYTES # BLD AUTO: 0.39 K/UL (ref 0–0.8)
MONOCYTES NFR BLD AUTO: 4.9 % (ref 2–6)
MPC BLD CALC-MCNC: 11 FL (ref 9.4–12.4)
NEUTROPHILS # BLD AUTO: 4.67 K/UL (ref 1.8–7.7)
NEUTROPHILS NFR BLD AUTO: 58.9 % (ref 53–65)
NONHDLC SERPL-MCNC: 106 MG/DL
NRBC # BLD AUTO: 0 X10E3/UL
NRBC, AUTO (.00): 0 %
PLATELET # BLD AUTO: 254 K/UL (ref 150–400)
POTASSIUM SERPL-SCNC: 4.9 MMOL/L (ref 3.5–5.1)
PROT SERPL-MCNC: 7.9 G/DL (ref 6.4–8.2)
RBC # BLD AUTO: 3.96 M/UL (ref 4.2–5.4)
SODIUM SERPL-SCNC: 137 MMOL/L (ref 136–145)
TRIGL SERPL-MCNC: 94 MG/DL (ref 35–150)
VIT B12 SERPL-MCNC: 996 PG/ML (ref 193–986)
VLDLC SERPL-MCNC: 19 MG/DL
WBC # BLD AUTO: 7.92 K/UL (ref 4.5–11)

## 2024-06-20 PROCEDURE — 80061 LIPID PANEL: CPT | Mod: ,,, | Performed by: CLINICAL MEDICAL LABORATORY

## 2024-06-20 PROCEDURE — 80053 COMPREHEN METABOLIC PANEL: CPT | Mod: ,,, | Performed by: CLINICAL MEDICAL LABORATORY

## 2024-06-20 PROCEDURE — 82607 VITAMIN B-12: CPT | Mod: ,,, | Performed by: CLINICAL MEDICAL LABORATORY

## 2024-06-20 PROCEDURE — 82043 UR ALBUMIN QUANTITATIVE: CPT | Mod: ,,, | Performed by: CLINICAL MEDICAL LABORATORY

## 2024-06-20 PROCEDURE — 82570 ASSAY OF URINE CREATININE: CPT | Mod: ,,, | Performed by: CLINICAL MEDICAL LABORATORY

## 2024-06-20 PROCEDURE — 82306 VITAMIN D 25 HYDROXY: CPT | Mod: ,,, | Performed by: CLINICAL MEDICAL LABORATORY

## 2024-06-20 PROCEDURE — 82746 ASSAY OF FOLIC ACID SERUM: CPT | Mod: ,,, | Performed by: CLINICAL MEDICAL LABORATORY

## 2024-06-20 PROCEDURE — 83036 HEMOGLOBIN GLYCOSYLATED A1C: CPT | Mod: ,,, | Performed by: CLINICAL MEDICAL LABORATORY

## 2024-06-20 PROCEDURE — 85025 COMPLETE CBC W/AUTO DIFF WBC: CPT | Mod: ,,, | Performed by: CLINICAL MEDICAL LABORATORY

## 2024-06-20 PROCEDURE — 99214 OFFICE O/P EST MOD 30 MIN: CPT | Mod: ,,, | Performed by: NURSE PRACTITIONER

## 2024-06-20 RX ORDER — GLIPIZIDE 5 MG/1
TABLET, FILM COATED, EXTENDED RELEASE ORAL
Qty: 90 TABLET | Refills: 1 | Status: SHIPPED | OUTPATIENT
Start: 2024-06-20

## 2024-06-20 RX ORDER — LISINOPRIL 40 MG/1
40 TABLET ORAL DAILY
Qty: 90 TABLET | Refills: 1 | Status: SHIPPED | OUTPATIENT
Start: 2024-06-20 | End: 2025-06-20

## 2024-06-20 RX ORDER — SITAGLIPTIN AND METFORMIN HYDROCHLORIDE 1000; 100 MG/1; MG/1
1 TABLET, FILM COATED, EXTENDED RELEASE ORAL DAILY
Qty: 90 TABLET | Refills: 1 | Status: SHIPPED | OUTPATIENT
Start: 2024-06-20

## 2024-06-20 RX ORDER — SIMVASTATIN 40 MG/1
40 TABLET, FILM COATED ORAL NIGHTLY
Qty: 90 TABLET | Refills: 1 | Status: SHIPPED | OUTPATIENT
Start: 2024-06-20 | End: 2024-12-17

## 2024-06-20 RX ORDER — CITALOPRAM 20 MG/1
20 TABLET, FILM COATED ORAL DAILY
Qty: 90 TABLET | Refills: 1 | Status: SHIPPED | OUTPATIENT
Start: 2024-06-20

## 2024-06-20 RX ORDER — ERGOCALCIFEROL 1.25 MG/1
50000 CAPSULE ORAL
Qty: 13 CAPSULE | Refills: 1 | Status: SHIPPED | OUTPATIENT
Start: 2024-06-20 | End: 2025-06-20

## 2024-06-20 RX ORDER — CELECOXIB 100 MG/1
100 CAPSULE ORAL DAILY
Qty: 90 CAPSULE | Refills: 1 | Status: SHIPPED | OUTPATIENT
Start: 2024-06-20

## 2024-06-20 NOTE — PROGRESS NOTES
DELFINA Clark   [facility] [address] [place] [address]  [phone]      PATIENT NAME: Saray Jorgensen  : 1955  DATE: 24  MRN: [id number]      Billing Provider: DELFINA Clark  Level of Service: LA OFFICE/OUTPT VISIT, EST, LEVL IV, 30-39 MIN  Patient PCP Information       Provider PCP Type    DELFINA Clark [facility]            Reason for Visit / Chief Complaint: Follow-up, Diabetes (3mth f/u diabetes/Pt is fasting labs today ), Medication Refill, and Health Maintenance (Discussed care gaps w/pt/Pt due for foot exam )       Update PCP  Update Chief Complaint         History of Present Illness / Problem Focused Workflow     68 year old female presents for follow up   Denies any complaints or problems today  Needing medication refills    Review of Systems     Review of Systems   Constitutional:  Negative for fatigue and fever.   HENT:  Negative for congestion.    Respiratory:  Negative for cough and shortness of breath.    Cardiovascular:  Negative for palpitations.   Gastrointestinal:  Negative for abdominal pain, constipation and diarrhea.   Endocrine: Negative for polydipsia and polyuria.   Musculoskeletal:  Positive for arthralgias and myalgias. Negative for gait problem.   Neurological:  Negative for dizziness, weakness and headaches.   Psychiatric/Behavioral:  Negative for agitation and dysphoric mood.        Medical / Social / Family History     Past Medical History:   Diagnosis Date    Colon cancer     Diabetes     Diabetes mellitus, type 2     Gallstones     History of colonoscopy 2014    Hyperlipidemia     Hypertension        Past Surgical History:   Procedure Laterality Date    COLECTOMY, RIGHT      CYSTOSCOPY N/A 2023    Procedure: CYSTOSCOPY;  Surgeon: Niyah King MD;  Location: [facility];  Service: OB/GYN;  Laterality: N/A;    HYSTERECTOMY      HYSTERECTOMY, TOTAL, LAPAROSCOPIC, WITH  SALPINGO-OOPHORECTOMY Bilateral 05/31/2023    Procedure: HYSTERECTOMY,TOTAL,LAPAROSCOPIC,WITH SALPINGO-OOPHORECTOMY;  Surgeon: Niyah King MD;  Location: [facility];  Service: OB/GYN;  Laterality: Bilateral;       Social History  Ms.  reports that she has never smoked. She has never been exposed to tobacco smoke. She has never used smokeless tobacco. She reports that she does not drink alcohol and does not use drugs.    Family History  Ms.'s family history includes Diabetes in her brother, mother, and sister; Hypertension in her brother, mother, and sister; Prostate cancer in her father.    Medications and Allergies     Medications  Outpatient Medications Marked as Taking for the 6/20/24 encounter (Office Visit) with Kailee Garcia FNP   Medication Sig Dispense Refill    ascorbic acid-multivit-min (VITAMIN C ENERGY BOOSTER) 1,000 mg PwEP Take 1 packet by mouth once daily.      ascorbic acid-multivit-min (VITAMIN C FIZZY DRINK) 1,000 mg PwEP Take 1 packet by mouth once daily.      aspirin 325 MG tablet Take 1 tablet (325 mg total) by mouth once daily. 90 tablet 2    cetirizine (ZYRTEC) 10 MG tablet Take 1 tablet (10 mg total) by mouth once daily. 90 tablet 1    fluticasone propionate (FLONASE) 50 mcg/actuation nasal spray 1 spray by Each Nostril route once daily.      furosemide (LASIX) 40 MG tablet Take 1 tablet (40 mg total) by mouth once daily. 90 tablet 1    GARLIC ORAL Take 1 tablet by mouth once daily.      hydroCHLOROthiazide (HYDRODIURIL) 12.5 MG Tab Take 1-2 tablets (12.5-25 mg total) by mouth once daily. 180 tablet 1    multivitamin (THERAGRAN) per tablet Take 1 tablet by mouth once daily.      olopatadine (PATADAY) 0.2 % Drop       [DISCONTINUED] celecoxib (CELEBREX) 100 MG capsule TAKE ONE CAPSULE BY MOUTH ONCE DAILY 30 capsule 2    [DISCONTINUED] citalopram (CELEXA) 20 MG tablet Take 1 tablet (20 mg total) by mouth once daily. 90 tablet 1    [DISCONTINUED] ergocalciferol (ERGOCALCIFEROL)  50,000 unit Cap Take 1 capsule (50,000 Units total) by mouth every 7 days. 13 capsule 1    [DISCONTINUED] glipiZIDE 5 MG TR24 TAKE ONE TABLET BY MOUTH ONCE DAILY WITH BREAKFAST 90 tablet 1    [DISCONTINUED] JANUMET -1,000 mg TM24 Take 1 tablet by mouth once daily. 90 tablet 1    [DISCONTINUED] lisinopriL (PRINIVIL,ZESTRIL) 40 MG tablet Take 1 tablet (40 mg total) by mouth once daily. 90 tablet 1    [DISCONTINUED] simvastatin (ZOCOR) 40 MG tablet Take 1 tablet (40 mg total) by mouth every evening. 90 tablet 1       Allergies  Review of patient's allergies indicates:  No Known Allergies    Physical Examination     Vitals:    06/20/24 1033   BP: 124/80   Pulse: 85   Resp: 18   Temp: 97.2 °F (36.2 °C)     Physical Exam  Constitutional:       General: She is not in acute distress.     Appearance: She is obese.   HENT:      Head: Normocephalic.      Nose: Nose normal.      Mouth/Throat:      Mouth: Mucous membranes are moist.   Eyes:      Extraocular Movements: Extraocular movements intact.   Cardiovascular:      Rate and Rhythm: Normal rate.   Pulmonary:      Effort: Pulmonary effort is normal. No respiratory distress.   Abdominal:      General: Bowel sounds are normal.      Palpations: Abdomen is soft.   Musculoskeletal:         General: Normal range of motion.      Cervical back: Normal range of motion.   Skin:     General: Skin is warm.   Neurological:      Mental Status: She is alert.   Psychiatric:         Behavior: Behavior normal.     Protective Sensation (w/ 10 gram monofilament):  Right: Intact  Left: Intact    Visual Inspection:  Normal -  Bilateral    Pedal Pulses:   Right: Present  Left: Present    Posterior Tibialis Pulses:   Right:Present  Left: Present        Imaging / Labs     Office Visit on 06/20/2024   Component Date Value Ref Range Status    Creatinine, Urine 06/20/2024 88  28 - 219 mg/dL Final    Microalbumin 06/20/2024 0.6  0.0 - 2.8 mg/dL Final    Microalbumin/Creatinine Ratio 06/20/2024 6.8   0.0 - 30.0 mg/g Final    Hemoglobin A1C 06/20/2024 6.2  4.5 - 6.6 % Final    Estimated Average Glucose 06/20/2024 131  mg/dL Final    Sodium 06/20/2024 137  136 - 145 mmol/L Final    Potassium 06/20/2024 4.9  3.5 - 5.1 mmol/L Final    Chloride 06/20/2024 103  98 - 107 mmol/L Final    CO2 06/20/2024 28  21 - 32 mmol/L Final    Anion Gap 06/20/2024 11  7 - 16 mmol/L Final    Glucose 06/20/2024 193 (H)  74 - 106 mg/dL Final    BUN 06/20/2024 16  7 - 18 mg/dL Final    Creatinine 06/20/2024 0.85  0.55 - 1.02 mg/dL Final    BUN/Creatinine Ratio 06/20/2024 19  6 - 20 Final    Calcium 06/20/2024 9.3  8.5 - 10.1 mg/dL Final    Total Protein 06/20/2024 7.9  6.4 - 8.2 g/dL Final    Albumin 06/20/2024 3.5  3.5 - 5.0 g/dL Final    Globulin 06/20/2024 4.4 (H)  2.0 - 4.0 g/dL Final    A/G Ratio 06/20/2024 0.8   Final    Bilirubin, Total 06/20/2024 0.3  >0.0 - 1.2 mg/dL Final    Alk Phos 06/20/2024 78  55 - 142 U/L Final    ALT 06/20/2024 16  13 - 56 U/L Final    AST 06/20/2024 9 (L)  15 - 37 U/L Final    eGFR 06/20/2024 75  >=60 mL/min/1.73m2 Final    Triglycerides 06/20/2024 94  35 - 150 mg/dL Final    Cholesterol 06/20/2024 172  0 - 200 mg/dL Final    HDL Cholesterol 06/20/2024 66 (H)  40 - 60 mg/dL Final    Cholesterol/HDL Ratio (Risk Factor) 06/20/2024 2.6   Final    Non-HDL 06/20/2024 106  mg/dL Final    LDL Calculated 06/20/2024 87  mg/dL Final    LDL/HDL 06/20/2024 1.3   Final    VLDL 06/20/2024 19  mg/dL Final    Vitamin B12 06/20/2024 996 (H)  193 - 986 pg/mL Final    Folate 06/20/2024 >20.0 (H)  3.1 - 17.5 ng/mL Final    Vitamin D 25-Hydroxy, Blood 06/20/2024 83.4  ng/mL Final    WBC 06/20/2024 7.92  4.50 - 11.00 K/uL Final    RBC 06/20/2024 3.96 (L)  4.20 - 5.40 M/uL Final    Hemoglobin 06/20/2024 11.8 (L)  12.0 - 16.0 g/dL Final    Hematocrit 06/20/2024 39.4  38.0 - 47.0 % Final    MCV 06/20/2024 99.5 (H)  80.0 - 96.0 fL Final    MCH 06/20/2024 29.8  27.0 - 31.0 pg Final    MCHC 06/20/2024 29.9 (L)  32.0 - 36.0 g/dL  Final    RDW 06/20/2024 12.4  11.5 - 14.5 % Final    Platelet Count 06/20/2024 254  150 - 400 K/uL Final    MPV 06/20/2024 11.0  9.4 - 12.4 fL Final    Neutrophils % 06/20/2024 58.9  53.0 - 65.0 % Final    Lymphocytes % 06/20/2024 30.4  27.0 - 41.0 % Final    Monocytes % 06/20/2024 4.9  2.0 - 6.0 % Final    Eosinophils % 06/20/2024 4.7 (H)  1.0 - 4.0 % Final    Basophils % 06/20/2024 0.8  0.0 - 1.0 % Final    Immature Granulocytes % 06/20/2024 0.3  0.0 - 0.4 % Final    nRBC, Auto 06/20/2024 0.0  <=0.0 % Final    Neutrophils, Abs 06/20/2024 4.67  1.80 - 7.70 K/uL Final    Lymphocytes, Absolute 06/20/2024 2.41  1.00 - 4.80 K/uL Final    Monocytes, Absolute 06/20/2024 0.39  0.00 - 0.80 K/uL Final    Eosinophils, Absolute 06/20/2024 0.37  0.00 - 0.50 K/uL Final    Basophils, Absolute 06/20/2024 0.06  0.00 - 0.20 K/uL Final    Immature Granulocytes, Absolute 06/20/2024 0.02  0.00 - 0.04 K/uL Final    nRBC, Absolute 06/20/2024 0.00  <=0.00 x10e3/uL Final    Diff Type 06/20/2024 Auto   Final     X-Ray Hip 2 or 3 views Left with Pelvis when performed  Narrative: EXAMINATION:  XR HIP WITH PELVIS WHEN PERFORMED 2 OR 3 VIEWS LEFT    CLINICAL HISTORY:  Pain in left hip    TECHNIQUE:  XR HIP WITH PELVIS WHEN PERFORMED 2 OR 3 VIEWS LEFT    COMPARISON:  2022    FINDINGS:  No acute fracture or dislocation.    Mild-to-moderate degenerative change of the hips    No radiopaque foreign bodies.  Impression: No acute findings    Mild-to-moderate degenerative change of the hips    Electronically signed by: Emilio Moore  Date:    06/28/2024  Time:    09:16  X-Ray Knee 1 or 2 View Left  Narrative: EXAMINATION:  XR KNEE 1 OR 2 VIEW LEFT    CLINICAL HISTORY:  Pain in left knee    COMPARISON:  None    TECHNIQUE:  Frontal and lateral views of the left knee.    FINDINGS:  Moderate to severe tricompartmental degenerative change.  Chondrocalcinosis.  No acute fracture or dislocation.  Impression: As above.    Point of Service: [facility]    Electronically signed by: Leonard Srinivasan  Date:    06/28/2024  Time:    09:16      Assessment and Plan (including Health Maintenance)      Problem List  Smart Sets  Document Outside HM   :    Health Maintenance Due   Topic Date Due    COVID-19 Vaccine (1) Never done    TETANUS VACCINE  Never done    Shingles Vaccine (1 of 2) Never done    RSV Vaccine (Age 60+ and Pregnant patients) (1 - 1-dose 60+ series) Never done       Problem List Items Addressed This Visit          Psychiatric    Recurrent major depressive disorder, in full remission    Relevant Medications    citalopram (CELEXA) 20 MG tablet       Cardiac/Vascular    Essential hypertension - Primary    Current Assessment & Plan     Condition is stable  Will get fasting labs today  Continue current plan          Relevant Medications    lisinopriL (PRINIVIL,ZESTRIL) 40 MG tablet    Other Relevant Orders    CBC Auto Differential (Completed)    Hyperlipidemia    Current Assessment & Plan     Lab Results   Component Value Date    CHOL 167 01/12/2024    CHOL 139 04/20/2023    CHOL 169 09/27/2022     Lab Results   Component Value Date    HDL 68 (H) 01/12/2024    HDL 49 04/20/2023    HDL 65 (H) 09/27/2022     Lab Results   Component Value Date    LDLCALC 83 01/12/2024    LDLCALC 71 04/20/2023    LDLCALC 85 09/27/2022     Lab Results   Component Value Date    TRIG 78 01/12/2024    TRIG 93 04/20/2023    TRIG 95 09/27/2022       Lab Results   Component Value Date    CHOLHDL 2.5 01/12/2024    CHOLHDL 2.8 04/20/2023    CHOLHDL 2.6 09/27/2022     Will get fasting lipids today  Continue current plan          Relevant Medications    simvastatin (ZOCOR) 40 MG tablet    Other Relevant Orders    Lipid Panel (Completed)       Endocrine    Type 2 diabetes mellitus without complication, without long-term current use of insulin    Current Assessment & Plan     Hemoglobin A1C   Date Value Ref Range Status   01/12/2024 6.3 4.5 - 6.6 % Final     Comment:       Normal:                <5.7%  Pre-Diabetic:       5.7% to 6.4%  Diabetic:             >6.4%  Diabetic Goal:     <7%   07/24/2023 6.7 (H) 4.5 - 6.6 % Final     Comment:       Normal:               <5.7%  Pre-Diabetic:       5.7% to 6.4%  Diabetic:             >6.4%  Diabetic Goal:     <7%   04/20/2023 7.6 (H) 4.5 - 6.6 % Final     Comment:       Normal:               <5.7%  Pre-Diabetic:       5.7% to 6.4%  Diabetic:             >6.4%  Diabetic Goal:     <7%   Will get A1c today  She has lost weight since last visit; discussed diet and exercise  Foot exam today  Continue current plan          Relevant Medications    glipiZIDE 5 MG TR24    JANUMET -1,000 mg TM24    Other Relevant Orders    Hemoglobin A1C (Completed)    Comprehensive Metabolic Panel (Completed)    Microalbumin/Creatinine Ratio, Urine (Completed)    Vitamin B 12 deficiency    Relevant Medications    ergocalciferol (ERGOCALCIFEROL) 50,000 unit Cap    Other Relevant Orders    Vitamin B12 & Folate (Completed)       Orthopedic    Arthritis of right hip    Relevant Medications    celecoxib (CELEBREX) 100 MG capsule    Osteoarthritis of both knees    Relevant Medications    celecoxib (CELEBREX) 100 MG capsule     Other Visit Diagnoses       Class 3 severe obesity with body mass index (BMI) of 40.0 to 44.9 in adult, unspecified obesity type, unspecified whether serious comorbidity present        Relevant Orders    CBC Auto Differential (Completed)    Comprehensive Metabolic Panel (Completed)    Vitamin D (Completed)            Health Maintenance Topics with due status: Not Due       Topic Last Completion Date    Colorectal Cancer Screening 12/18/2015    DEXA Scan 04/21/2022    Influenza Vaccine 10/05/2023    Mammogram 11/08/2023    Eye Exam 03/18/2024    Diabetes Urine Screening 06/20/2024    Foot Exam 06/20/2024    Lipid Panel 06/20/2024    Hemoglobin A1c 06/20/2024    Low Dose Statin 06/26/2024       Future Appointments   Date Time Provider Department Center   7/9/2024  10:00 AM Yeny Gillis PA [facility]   11/13/2024  1:20 PM [facility]   12/18/2024  9:40 AM [facility]          Signature:  [facility]LFINA Clark [facility] [address] [place] [address]  [phone]    Date of encounter: 6/20/24

## 2024-06-20 NOTE — ASSESSMENT & PLAN NOTE
Lab Results   Component Value Date    CHOL 167 01/12/2024    CHOL 139 04/20/2023    CHOL 169 09/27/2022     Lab Results   Component Value Date    HDL 68 (H) 01/12/2024    HDL 49 04/20/2023    HDL 65 (H) 09/27/2022     Lab Results   Component Value Date    LDLCALC 83 01/12/2024    LDLCALC 71 04/20/2023    LDLCALC 85 09/27/2022     Lab Results   Component Value Date    TRIG 78 01/12/2024    TRIG 93 04/20/2023    TRIG 95 09/27/2022       Lab Results   Component Value Date    CHOLHDL 2.5 01/12/2024    CHOLHDL 2.8 04/20/2023    CHOLHDL 2.6 09/27/2022     Will get fasting lipids today  Continue current plan

## 2024-06-20 NOTE — ASSESSMENT & PLAN NOTE
Hemoglobin A1C   Date Value Ref Range Status   01/12/2024 6.3 4.5 - 6.6 % Final     Comment:       Normal:               <5.7%  Pre-Diabetic:       5.7% to 6.4%  Diabetic:             >6.4%  Diabetic Goal:     <7%   07/24/2023 6.7 (H) 4.5 - 6.6 % Final     Comment:       Normal:               <5.7%  Pre-Diabetic:       5.7% to 6.4%  Diabetic:             >6.4%  Diabetic Goal:     <7%   04/20/2023 7.6 (H) 4.5 - 6.6 % Final     Comment:       Normal:               <5.7%  Pre-Diabetic:       5.7% to 6.4%  Diabetic:             >6.4%  Diabetic Goal:     <7%   Will get A1c today  She has lost weight since last visit; discussed diet and exercise  Foot exam today  Continue current plan

## 2024-06-24 ENCOUNTER — TELEPHONE (OUTPATIENT)
Dept: FAMILY MEDICINE | Facility: CLINIC | Age: 69
End: 2024-06-24
Payer: COMMERCIAL

## 2024-06-24 NOTE — PROGRESS NOTES
Discussed lab results and recommendations per pt via phone  No noted concerns  Voiced understanding

## 2024-06-24 NOTE — TELEPHONE ENCOUNTER
----- Message from DELFINA Clark sent at 6/24/2024  9:39 AM CDT -----  Labs are okay. Recheck about 6 mo

## 2024-06-26 ENCOUNTER — OFFICE VISIT (OUTPATIENT)
Dept: FAMILY MEDICINE | Facility: CLINIC | Age: 69
End: 2024-06-26
Payer: COMMERCIAL

## 2024-06-26 VITALS
BODY MASS INDEX: 41.95 KG/M2 | SYSTOLIC BLOOD PRESSURE: 125 MMHG | RESPIRATION RATE: 18 BRPM | TEMPERATURE: 98 F | HEIGHT: 70 IN | OXYGEN SATURATION: 98 % | HEART RATE: 80 BPM | DIASTOLIC BLOOD PRESSURE: 81 MMHG | WEIGHT: 293 LBS

## 2024-06-26 DIAGNOSIS — M25.552 LEFT HIP PAIN: ICD-10-CM
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
## 2024-06-26 DIAGNOSIS — M25.562 LEFT KNEE PAIN, UNSPECIFIED CHRONICITY: ICD-10-CM

## 2024-06-26 PROCEDURE — 96372 THER/PROPH/DIAG INJ SC/IM: CPT | Mod: ,,, | Performed by: NURSE PRACTITIONER

## 2024-06-26 PROCEDURE — 99213 OFFICE O/P EST LOW 20 MIN: CPT | Mod: 25,,, | Performed by: NURSE PRACTITIONER

## 2024-06-26 RX ORDER — DEXAMETHASONE SODIUM PHOSPHATE 4 MG/ML
4 INJECTION, SOLUTION INTRA-ARTICULAR; INTRALESIONAL; INTRAMUSCULAR; INTRAVENOUS; SOFT TISSUE
Status: COMPLETED | OUTPATIENT
Start: 2024-06-26 | End: 2024-06-26

## 2024-06-26 RX ADMIN — DEXAMETHASONE SODIUM PHOSPHATE 4 MG: 4 INJECTION, SOLUTION INTRA-ARTICULAR; INTRALESIONAL; INTRAMUSCULAR; INTRAVENOUS; SOFT TISSUE at 02:06

## 2024-06-28 ENCOUNTER — HOSPITAL ENCOUNTER (OUTPATIENT)
Dept: RADIOLOGY | Facility: HOSPITAL | Age: 69
Discharge: HOME | End: 2024-06-28
Attending: NURSE PRACTITIONER
Payer: COMMERCIAL

## 2024-06-28 DIAGNOSIS — M25.552 LEFT HIP PAIN: ICD-10-CM

## 2024-06-28 DIAGNOSIS — M25.562 LEFT KNEE PAIN, UNSPECIFIED CHRONICITY: ICD-10-CM

## 2024-06-28 PROCEDURE — 73560 X-RAY EXAM OF KNEE 1 OR 2: CPT | Mod: TC,LT

## 2024-06-28 PROCEDURE — 73502 X-RAY EXAM HIP UNI 2-3 VIEWS: CPT | Mod: TC,LT

## 2024-07-01 ENCOUNTER — TELEPHONE (OUTPATIENT)
Dept: FAMILY MEDICINE | Facility: CLINIC | Age: 69
End: 2024-07-01
Payer: COMMERCIAL

## 2024-07-01 DIAGNOSIS — M25.552 LEFT HIP PAIN: ICD-10-CM

## 2024-07-01 DIAGNOSIS — M25.562 LEFT KNEE PAIN, UNSPECIFIED CHRONICITY: Primary | ICD-10-CM

## 2024-07-01 NOTE — TELEPHONE ENCOUNTER
----- Message from DELFINA Clark sent at 7/1/2024 12:54 PM CDT -----  Knee and hip x-ray indicates arthritis and degenerative changes. Will refer to ortho

## 2024-07-01 NOTE — PROGRESS NOTES
Discussed XRAY results and recommendations with pt via phone  No noted concerns  Voiced understanding

## 2024-07-09 ENCOUNTER — OFFICE VISIT (OUTPATIENT)
Dept: ORTHOPEDICS | Facility: CLINIC | Age: 69
End: 2024-07-09
Payer: COMMERCIAL

## 2024-07-09 VITALS — BODY MASS INDEX: 41.95 KG/M2 | WEIGHT: 293 LBS | HEIGHT: 70 IN

## 2024-07-09 DIAGNOSIS — M25.562 LEFT KNEE PAIN, UNSPECIFIED CHRONICITY: ICD-10-CM

## 2024-07-09 DIAGNOSIS — M25.552 LEFT HIP PAIN: ICD-10-CM

## 2024-07-09 PROCEDURE — 99215 OFFICE O/P EST HI 40 MIN: CPT | Mod: PBBFAC

## 2024-07-09 PROCEDURE — 99999 PR PBB SHADOW E&M-EST. PATIENT-LVL V: CPT | Mod: PBBFAC,,,

## 2024-07-09 RX ORDER — DICLOFENAC SODIUM 10 MG/G
2 GEL TOPICAL 2 TIMES DAILY
Qty: 100 G | Refills: 2 | Status: SHIPPED | OUTPATIENT
Start: 2024-07-09

## 2024-07-09 NOTE — PROGRESS NOTES
CC:   Chief Complaint   Patient presents with    Left Knee - Pain    Left Hip - Pain          Saray Jorgensen is a 68 y.o. female seen today for Pain of the Left Knee and Pain of the Left Hip  Patient reports an incident approximately 2 weeks ago when her knee gave out on her. She began to have pain in her hip after the injury.  She denies feeling a pop or any swelling of the knee following the injury.  She was initially evaluated at [facility] clinic fracture or dislocation of either the hip or the knee and was referred to orthopedic for further evaluation.  She states that the pain has improved with time, but she still has a dull aching type pain in her left knee.  She does report instability of the knee.  Other complaints today.      PAST MEDICAL HISTORY:   Past Medical History:   Diagnosis Date    Colon cancer     Diabetes     Diabetes mellitus, type 2     Gallstones     History of colonoscopy 11/03/2014    Hyperlipidemia     Hypertension           PAST SURGICAL HISTORY:   Past Surgical History:   Procedure Laterality Date    COLECTOMY, RIGHT      CYSTOSCOPY N/A 05/31/2023    Procedure: CYSTOSCOPY;  Surgeon: Niyah King MD;  Location: [facility];  Service: OB/GYN;  Laterality: N/A;    HYSTERECTOMY      HYSTERECTOMY, TOTAL, LAPAROSCOPIC, WITH SALPINGO-OOPHORECTOMY Bilateral 05/31/2023    Procedure: HYSTERECTOMY,TOTAL,LAPAROSCOPIC,WITH SALPINGO-OOPHORECTOMY;  Surgeon: Niyah King MD;  Location: [facility];  Service: OB/GYN;  Laterality: Bilateral;          ALLERGIES: Review of patient's allergies indicates:  No Known Allergies     MEDICATIONS :    Current Outpatient Medications:     ascorbic acid-multivit-min (VITAMIN C ENERGY BOOSTER) 1,000 mg PwEP, Take 1 packet by mouth once daily., Disp: , Rfl:     ascorbic acid-multivit-min (VITAMIN C FIZZY DRINK) 1,000 mg PwEP, Take 1 packet by mouth once daily., Disp: , Rfl:     aspirin 325 MG tablet, Take 1 tablet (325 mg total)  by mouth once daily., Disp: 90 tablet, Rfl: 2    celecoxib (CELEBREX) 100 MG capsule, Take 1 capsule (100 mg total) by mouth once daily., Disp: 90 capsule, Rfl: 1    cetirizine (ZYRTEC) 10 MG tablet, Take 1 tablet (10 mg total) by mouth once daily., Disp: 90 tablet, Rfl: 1    citalopram (CELEXA) 20 MG tablet, Take 1 tablet (20 mg total) by mouth once daily., Disp: 90 tablet, Rfl: 1    diclofenac sodium (VOLTAREN) 1 % Gel, Apply 2 g topically 2 (two) times daily., Disp: 100 g, Rfl: 2    ergocalciferol (ERGOCALCIFEROL) 50,000 unit Cap, Take 1 capsule (50,000 Units total) by mouth every 7 days., Disp: 13 capsule, Rfl: 1    fluticasone propionate (FLONASE) 50 mcg/actuation nasal spray, 1 spray by Each Nostril route once daily., Disp: , Rfl:     furosemide (LASIX) 40 MG tablet, Take 1 tablet (40 mg total) by mouth once daily., Disp: 90 tablet, Rfl: 1    GARLIC ORAL, Take 1 tablet by mouth once daily., Disp: , Rfl:     glipiZIDE 5 MG TR24, TAKE ONE TABLET BY MOUTH ONCE DAILY WITH BREAKFAST, Disp: 90 tablet, Rfl: 1    hydroCHLOROthiazide (HYDRODIURIL) 12.5 MG Tab, Take 1-2 tablets (12.5-25 mg total) by mouth once daily., Disp: 180 tablet, Rfl: 1    JANUMET -1,000 mg TM24, Take 1 tablet by mouth once daily., Disp: 90 tablet, Rfl: 1    lisinopriL (PRINIVIL,ZESTRIL) 40 MG tablet, Take 1 tablet (40 mg total) by mouth once daily., Disp: 90 tablet, Rfl: 1    multivitamin (THERAGRAN) per tablet, Take 1 tablet by mouth once daily., Disp: , Rfl:     olopatadine (PATADAY) 0.2 % Drop, , Disp: , Rfl:     simvastatin (ZOCOR) 40 MG tablet, Take 1 tablet (40 mg total) by mouth every evening., Disp: 90 tablet, Rfl: 1     SOCIAL HISTORY:   Social History     Socioeconomic History    Marital status:     Number of children: 5   Occupational History     Comment: Disabled   Tobacco Use    Smoking status: Never     Passive exposure: Never    Smokeless tobacco: Never   Substance and Sexual Activity    Alcohol use: Never    Drug  use: Never    Sexual activity: Not Currently     Partners: Male     Birth control/protection: See Surgical Hx   Social History Narrative    Lives in home with family        FAMILY HISTORY:   Family History   Problem Relation Name Age of Onset    Diabetes Mother      Hypertension Mother      Prostate cancer Father      Diabetes Sister      Hypertension Sister      Diabetes Brother      Hypertension Brother            PHYSICAL EXAM:      There were no vitals filed for this visit.  Body mass index is 42.62 kg/m².    GENERAL: Well-developed, well-nourished female . The patient is alert, oriented and cooperative.    HEENT:  Normocephalic, atraumatic.  Extraocular movements are intact bilaterally.     NECK:  Nontender with good range of motion.    LUNGS:  Clear to auscultation bilaterally.    HEART:  Regular rate and rhythm.     ABDOMEN:  Soft, non-tender, non-distended.      EXTREMITIES:  Left knee was skin clean dry and intact, tenderness to palpation anteriorly over patella, crepitus noted over patella with movement, range of motion from 0-100 degrees but not without pain, knee feels stable to varus and valgus stress testing, negative Corazon's testing, neurovascularly intact      RADIOGRAPHIC FINDINGS:   X-Ray Hip 2 or 3 views Left with Pelvis when performed    Result Date: 6/28/2024  EXAMINATION: XR HIP WITH PELVIS WHEN PERFORMED 2 OR 3 VIEWS LEFT CLINICAL HISTORY: Pain in left hip TECHNIQUE: XR HIP WITH PELVIS WHEN PERFORMED 2 OR 3 VIEWS LEFT COMPARISON: 2022 FINDINGS: No acute fracture or dislocation. Mild-to-moderate degenerative change of the hips No radiopaque foreign bodies.     No acute findings Mild-to-moderate degenerative change of the hips Electronically signed by: Emilio Moore Date:    06/28/2024 Time:    09:16    X-Ray Knee 1 or 2 View Left    Result Date: 6/28/2024  EXAMINATION: XR KNEE 1 OR 2 VIEW LEFT CLINICAL HISTORY: Pain in left knee COMPARISON: None TECHNIQUE: Frontal and lateral views of the  left knee. FINDINGS: Moderate to severe tricompartmental degenerative change.  Chondrocalcinosis.  No acute fracture or dislocation.     As above. Point of Service: [facility] Electronically signed by: Leonard Srinivasan Date:    06/28/2024 Time:    09:16       Patient Active Problem List    Diagnosis Date Noted    Arthritis of right hip 03/21/2024    Osteoarthritis of both knees 03/21/2024    Status post laparoscopic hysterectomy 05/31/2023    Cyst of right ovary 04/04/2023    Vitamin B 12 deficiency 04/04/2023    Recurrent major depressive disorder, in full remission 04/04/2023    Hyperlipidemia 04/04/2023    Colon cancer 05/27/2022    Seasonal allergic rhinitis due to pollen 06/17/2021    Essential hypertension 06/17/2021    Type 2 diabetes mellitus without complication, without long-term current use of insulin 06/17/2021     IMPRESSION AND PLAN:  Left knee pain following an injury.  Personally reviewed previous x-rays with moderate to severe tricompartmental degenerative changes and chondrocalcinosis of the left, mild-to-moderate degenerative changes of the left hip, no acute fracture or dislocation.  Discussed all treatment options with the patient today.  Recommend Tylenol and Voltaren gel as needed for pain.  Continue ice and elevation therapy.  Recommend compression sleeve for the knee.  We will also refer her to physical therapy for range of motion and strengthening in his.  Follow up with me in 6 weeks if she is still having pain at that time after completion of physical therapy we will consider further evaluation with MRI.  Otherwise follow up p.r.n..    No follow-ups on file.       Yeny Gillis PA-C      (Subject to voice recognition error, transcription service not allowed)
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
## 2024-07-11 NOTE — ASSESSMENT & PLAN NOTE
Reports fall at [facility] about 4 days ago after tripping when turning to walk  Unable to get x-ray in clinic today; will order for x-ray at the [facility]  States she does not have a ride to get x-ray at the hospital today  Discussed risks/benefits/alternatives for treatment  Decadron IM today   Rest  Will treat as indicated following x-ray

## 2024-07-12 ENCOUNTER — TELEPHONE (OUTPATIENT)
Dept: ORTHOPEDICS | Facility: CLINIC | Age: 69
End: 2024-07-12
Payer: COMMERCIAL

## 2024-07-15 ENCOUNTER — TELEPHONE (OUTPATIENT)
Dept: ORTHOPEDICS | Facility: CLINIC | Age: 69
End: 2024-07-15
Payer: COMMERCIAL

## 2024-07-18 NOTE — PROGRESS NOTES
"   DELFINA Clark   [facility]  [address] [address] [address]  [phone]      PATIENT NAME: Saray Jorgensen  : 1955  DATE: 24  MRN: [id number]      Billing Provider: DELFINA Clark  Level of Service:   Patient PCP Information       Provider PCP Type    DELFINA Clark General            Reason for Visit / Chief Complaint: Fall (Saray Pike 68 year old black female presents to clinic with left hip/thigh pain and swelling to left knee due to fall at [facility] on 2024. Getting up and down is hard and her left knee has been stiff and it "just gave out on me  and let me down." Pain scale 10/10 while sitting/ 6/10 while moving. No bruising to thigh noted visually) and Health Maintenance (COVID-19 Vaccine(1) Never done/TETANUS VACCINE Never done/Shingles Vaccine(1 of 2) Never done/RSV Vaccine (Age 60+ and Pregnant patients)(1 - 1-dose 60+ series) Never done/All care gaps discussed / vaccines verbally declined by patient.)       Update PCP  Update Chief Complaint         History of Present Illness / Problem Focused Workflow     68 year old female presents with complaints of pain to left hip and knee  Report having fall at [facility] on 2024  Getting up and down is hard and her left knee has been stiff and it "just gave out on me  and let me down."   Pain scale 10/10 while sitting/ 6/10 while moving. No bruising to thigh noted visually   Denies hitting head or syncope      Review of Systems     Review of Systems   Constitutional:  Negative for fatigue and fever.   HENT:  Negative for congestion.    Respiratory:  Negative for cough and shortness of breath.    Cardiovascular:  Negative for palpitations.   Gastrointestinal:  Positive for constipation. Negative for abdominal pain and diarrhea.   Endocrine: Negative for polydipsia and polyuria.   Musculoskeletal:  Positive for arthralgias and myalgias. Negative " for back pain and gait problem.        Left hip and knee pain   Skin:  Negative for wound.   Neurological:  Negative for dizziness, weakness and headaches.   Psychiatric/Behavioral:  Negative for agitation and dysphoric mood.        Medical / Social / Family History     Past Medical History:   Diagnosis Date    Colon cancer     Diabetes     Diabetes mellitus, type 2     Gallstones     History of colonoscopy 11/03/2014    Hyperlipidemia     Hypertension        Past Surgical History:   Procedure Laterality Date    COLECTOMY, RIGHT      CYSTOSCOPY N/A 05/31/2023    Procedure: CYSTOSCOPY;  Surgeon: Niyah King MD;  Location: [facility] OR;  Service: OB/GYN;  Laterality: N/A;    HYSTERECTOMY      HYSTERECTOMY, TOTAL, LAPAROSCOPIC, WITH SALPINGO-OOPHORECTOMY Bilateral 05/31/2023    Procedure: HYSTERECTOMY,TOTAL,LAPAROSCOPIC,WITH SALPINGO-OOPHORECTOMY;  Surgeon: Niyah King MD;  Location: [facility] OR;  Service: OB/GYN;  Laterality: Bilateral;       Social History  Ms.  reports that she has never smoked. She has never been exposed to tobacco smoke. She has never used smokeless tobacco. She reports that she does not drink alcohol and does not use drugs.    Family History  Ms.'s family history includes Diabetes in her brother, mother, and sister; Hypertension in her brother, mother, and sister; Prostate cancer in her father.    Medications and Allergies     Medications  Outpatient Medications Marked as Taking for the 6/26/24 encounter (Office Visit) with Kailee Garcia FNP   Medication Sig Dispense Refill    ascorbic acid-multivit-min (VITAMIN C ENERGY BOOSTER) 1,000 mg PwEP Take 1 packet by mouth once daily.      ascorbic acid-multivit-min (VITAMIN C FIZZY DRINK) 1,000 mg PwEP Take 1 packet by mouth once daily.      aspirin 325 MG tablet Take 1 tablet (325 mg total) by mouth once daily. 90 tablet 2    celecoxib (CELEBREX) 100 MG capsule Take 1 capsule (100 mg total) by mouth once daily. 90 capsule 1     cetirizine (ZYRTEC) 10 MG tablet Take 1 tablet (10 mg total) by mouth once daily. 90 tablet 1    citalopram (CELEXA) 20 MG tablet Take 1 tablet (20 mg total) by mouth once daily. 90 tablet 1    ergocalciferol (ERGOCALCIFEROL) 50,000 unit Cap Take 1 capsule (50,000 Units total) by mouth every 7 days. 13 capsule 1    fluticasone propionate (FLONASE) 50 mcg/actuation nasal spray 1 spray by Each Nostril route once daily.      furosemide (LASIX) 40 MG tablet Take 1 tablet (40 mg total) by mouth once daily. 90 tablet 1    GARLIC ORAL Take 1 tablet by mouth once daily.      glipiZIDE 5 MG TR24 TAKE ONE TABLET BY MOUTH ONCE DAILY WITH BREAKFAST 90 tablet 1    hydroCHLOROthiazide (HYDRODIURIL) 12.5 MG Tab Take 1-2 tablets (12.5-25 mg total) by mouth once daily. 180 tablet 1    JANUMET -1,000 mg TM24 Take 1 tablet by mouth once daily. 90 tablet 1    lisinopriL (PRINIVIL,ZESTRIL) 40 MG tablet Take 1 tablet (40 mg total) by mouth once daily. 90 tablet 1    multivitamin (THERAGRAN) per tablet Take 1 tablet by mouth once daily.      olopatadine (PATADAY) 0.2 % Drop       simvastatin (ZOCOR) 40 MG tablet Take 1 tablet (40 mg total) by mouth every evening. 90 tablet 1       Allergies  Review of patient's allergies indicates:  No Known Allergies    Physical Examination     Vitals:    06/26/24 1409   BP: 125/81   Pulse: 80   Resp: 18   Temp: 98.2 °F (36.8 °C)     Physical Exam  Constitutional:       General: She is not in acute distress.     Appearance: She is obese.   HENT:      Head: Normocephalic.      Nose: Nose normal.      Mouth/Throat:      Mouth: Mucous membranes are moist.   Eyes:      Extraocular Movements: Extraocular movements intact.   Cardiovascular:      Rate and Rhythm: Normal rate.   Pulmonary:      Effort: Pulmonary effort is normal. No respiratory distress.   Abdominal:      General: Bowel sounds are normal.      Palpations: Abdomen is soft.   Musculoskeletal:         General: Signs of injury present. No  swelling or tenderness. Normal range of motion.      Cervical back: Normal range of motion.   Skin:     General: Skin is warm.      Findings: No bruising or erythema.   Neurological:      Mental Status: She is alert.   Psychiatric:         Behavior: Behavior normal.           Imaging / Labs     No visits with results within 1 Day(s) from this visit.   Latest known visit with results is:   Office Visit on 06/20/2024   Component Date Value Ref Range Status    Creatinine, Urine 06/20/2024 88  28 - 219 mg/dL Final    Microalbumin 06/20/2024 0.6  0.0 - 2.8 mg/dL Final    Microalbumin/Creatinine Ratio 06/20/2024 6.8  0.0 - 30.0 mg/g Final    Hemoglobin A1C 06/20/2024 6.2  4.5 - 6.6 % Final    Estimated Average Glucose 06/20/2024 131  mg/dL Final    Sodium 06/20/2024 137  136 - 145 mmol/L Final    Potassium 06/20/2024 4.9  3.5 - 5.1 mmol/L Final    Chloride 06/20/2024 103  98 - 107 mmol/L Final    CO2 06/20/2024 28  21 - 32 mmol/L Final    Anion Gap 06/20/2024 11  7 - 16 mmol/L Final    Glucose 06/20/2024 193 (H)  74 - 106 mg/dL Final    BUN 06/20/2024 16  7 - 18 mg/dL Final    Creatinine 06/20/2024 0.85  0.55 - 1.02 mg/dL Final    BUN/Creatinine Ratio 06/20/2024 19  6 - 20 Final    Calcium 06/20/2024 9.3  8.5 - 10.1 mg/dL Final    Total Protein 06/20/2024 7.9  6.4 - 8.2 g/dL Final    Albumin 06/20/2024 3.5  3.5 - 5.0 g/dL Final    Globulin 06/20/2024 4.4 (H)  2.0 - 4.0 g/dL Final    A/G Ratio 06/20/2024 0.8   Final    Bilirubin, Total 06/20/2024 0.3  >0.0 - 1.2 mg/dL Final    Alk Phos 06/20/2024 78  55 - 142 U/L Final    ALT 06/20/2024 16  13 - 56 U/L Final    AST 06/20/2024 9 (L)  15 - 37 U/L Final    eGFR 06/20/2024 75  >=60 mL/min/1.73m2 Final    Triglycerides 06/20/2024 94  35 - 150 mg/dL Final    Cholesterol 06/20/2024 172  0 - 200 mg/dL Final    HDL Cholesterol 06/20/2024 66 (H)  40 - 60 mg/dL Final    Cholesterol/HDL Ratio (Risk Factor) 06/20/2024 2.6   Final    Non-HDL 06/20/2024 106  mg/dL Final    LDL Calculated  06/20/2024 87  mg/dL Final    LDL/HDL 06/20/2024 1.3   Final    VLDL 06/20/2024 19  mg/dL Final    Vitamin B12 06/20/2024 996 (H)  193 - 986 pg/mL Final    Folate 06/20/2024 >20.0 (H)  3.1 - 17.5 ng/mL Final    Vitamin D 25-Hydroxy, Blood 06/20/2024 83.4  ng/mL Final    WBC 06/20/2024 7.92  4.50 - 11.00 K/uL Final    RBC 06/20/2024 3.96 (L)  4.20 - 5.40 M/uL Final    Hemoglobin 06/20/2024 11.8 (L)  12.0 - 16.0 g/dL Final    Hematocrit 06/20/2024 39.4  38.0 - 47.0 % Final    MCV 06/20/2024 99.5 (H)  80.0 - 96.0 fL Final    MCH 06/20/2024 29.8  27.0 - 31.0 pg Final    MCHC 06/20/2024 29.9 (L)  32.0 - 36.0 g/dL Final    RDW 06/20/2024 12.4  11.5 - 14.5 % Final    Platelet Count 06/20/2024 254  150 - 400 K/uL Final    MPV 06/20/2024 11.0  9.4 - 12.4 fL Final    Neutrophils % 06/20/2024 58.9  53.0 - 65.0 % Final    Lymphocytes % 06/20/2024 30.4  27.0 - 41.0 % Final    Monocytes % 06/20/2024 4.9  2.0 - 6.0 % Final    Eosinophils % 06/20/2024 4.7 (H)  1.0 - 4.0 % Final    Basophils % 06/20/2024 0.8  0.0 - 1.0 % Final    Immature Granulocytes % 06/20/2024 0.3  0.0 - 0.4 % Final    nRBC, Auto 06/20/2024 0.0  <=0.0 % Final    Neutrophils, Abs 06/20/2024 4.67  1.80 - 7.70 K/uL Final    Lymphocytes, Absolute 06/20/2024 2.41  1.00 - 4.80 K/uL Final    Monocytes, Absolute 06/20/2024 0.39  0.00 - 0.80 K/uL Final    Eosinophils, Absolute 06/20/2024 0.37  0.00 - 0.50 K/uL Final    Basophils, Absolute 06/20/2024 0.06  0.00 - 0.20 K/uL Final    Immature Granulocytes, Absolute 06/20/2024 0.02  0.00 - 0.04 K/uL Final    nRBC, Absolute 06/20/2024 0.00  <=0.00 x10e3/uL Final    Diff Type 06/20/2024 Auto   Final     X-Ray Hip 2 or 3 views Left with Pelvis when performed  Narrative: EXAMINATION:  XR HIP WITH PELVIS WHEN PERFORMED 2 OR 3 VIEWS LEFT    CLINICAL HISTORY:  Pain in left hip    TECHNIQUE:  XR HIP WITH PELVIS WHEN PERFORMED 2 OR 3 VIEWS LEFT    COMPARISON:  2022    FINDINGS:  No acute fracture or dislocation.    Mild-to-moderate  degenerative change of the hips    No radiopaque foreign bodies.  Impression: No acute findings    Mild-to-moderate degenerative change of the hips    Electronically signed by: Emilio Moore  Date:    06/28/2024  Time:    09:16  X-Ray Knee 1 or 2 View Left  Narrative: EXAMINATION:  XR KNEE 1 OR 2 VIEW LEFT    CLINICAL HISTORY:  Pain in left knee    COMPARISON:  None    TECHNIQUE:  Frontal and lateral views of the left knee.    FINDINGS:  Moderate to severe tricompartmental degenerative change.  Chondrocalcinosis.  No acute fracture or dislocation.  Impression: As above.    Point of Service: [facility]    Electronically signed by: Leonard Srinivasan  Date:    06/28/2024  Time:    09:16      Assessment and Plan (including Health Maintenance)      Problem List  Smart Sets  Document Outside HM   :    Health Maintenance Due   Topic Date Due    COVID-19 Vaccine (1) Never done    TETANUS VACCINE  Never done    Shingles Vaccine (1 of 2) Never done    RSV Vaccine (Age 60+ and Pregnant patients) (1 - 1-dose 60+ series) Never done       Problem List Items Addressed This Visit          Orthopedic    Fall - Primary    Current Assessment & Plan     Reports fall at [facility] about 4 days ago after tripping when turning to walk  Unable to get x-ray in clinic today; will order for x-ray at the [facility]  States she does not have a ride to get x-ray at the hospital today  Discussed risks/benefits/alternatives for treatment  Decadron IM today   Rest  Will treat as indicated following x-ray          Other Visit Diagnoses       Left hip pain        Relevant Orders    X-Ray Hip 2 or 3 views Left with Pelvis when performed (Completed)    Left knee pain, unspecified chronicity        Relevant Medications    dexAMETHasone injection 4 mg (Completed)            Health Maintenance Topics with due status: Not Due       Topic Last Completion Date    Colorectal Cancer Screening 12/18/2015    DEXA Scan 04/21/2022    Influenza Vaccine  10/05/2023    Mammogram 11/08/2023    Eye Exam 03/18/2024    Diabetes Urine Screening 06/20/2024    Foot Exam 06/20/2024    Lipid Panel 06/20/2024    Hemoglobin A1c 06/20/2024    Low Dose Statin 06/26/2024       Future Appointments   Date Time Provider Department Center   7/22/2024  1:15 PM Michael Walton PT [facility]   8/20/2024  1:00 PM Yeny Gillis PA [facility]   11/13/2024  1:20 PM [facility] MAMMO[facility]   12/18/2024  9:40 AM [facility]          Signature:  DELFINA Clark [facility] [address] [place] [address]  [phone]    Date of encounter: 6/26/24

## 2024-07-22 ENCOUNTER — CLINICAL SUPPORT (OUTPATIENT)
Dept: REHABILITATION | Facility: HOSPITAL | Age: 69
End: 2024-07-22
Payer: COMMERCIAL

## 2024-07-22 DIAGNOSIS — M25.562 LEFT KNEE PAIN, UNSPECIFIED CHRONICITY: ICD-10-CM

## 2024-07-22 DIAGNOSIS — M70.50 PES ANSERINE BURSITIS: Primary | ICD-10-CM

## 2024-07-22 PROCEDURE — 97161 PT EVAL LOW COMPLEX 20 MIN: CPT | Mod: PN

## 2024-07-22 PROCEDURE — 97110 THERAPEUTIC EXERCISES: CPT | Mod: PN

## 2024-08-01 ENCOUNTER — CLINICAL SUPPORT (OUTPATIENT)
Dept: REHABILITATION | Facility: HOSPITAL | Age: 69
End: 2024-08-01
Payer: COMMERCIAL

## 2024-08-01 DIAGNOSIS — M70.50 PES ANSERINE BURSITIS: Primary | ICD-10-CM

## 2024-08-01 DIAGNOSIS — M25.662 DECREASED RANGE OF MOTION (ROM) OF LEFT KNEE: ICD-10-CM

## 2024-08-01 DIAGNOSIS — M25.562 LEFT KNEE PAIN, UNSPECIFIED CHRONICITY: ICD-10-CM

## 2024-08-01 PROCEDURE — 97530 THERAPEUTIC ACTIVITIES: CPT | Mod: PN,CQ

## 2024-08-01 PROCEDURE — 97112 NEUROMUSCULAR REEDUCATION: CPT | Mod: PN,CQ

## 2024-08-01 NOTE — PROGRESS NOTES
Physical Therapy Treatment Note     Name: Saray Jorgensen  Clinic Number: [id number]    Therapy Diagnosis: No diagnosis found.  Physician: Yeny Gillis PA    Visit Date: 8/1/2024    Physician Orders: PT Eval and Treat    Medical Diagnosis from Referral: left knee pain   Evaluation Date: 7/22/2024  Updated Plan of Care Due : 9/22/2024  Authorization Period Expiration: [employer] see benefits   Plan of Care Expiration: 9/22/2024   Visit # / Visits authorized: 2/ 17 17 PT VISITS (INC EVAL) * 7/24/2024 - 9/22/2024   PTA Visit #: 1    Time In: 130  Time Out: 208  Total Billable Time: 38 minutes    Precautions: Standard      Subjective     Pt reports: I'm feeling much better, I don't feel like it's going to give away on me like it did.  She was compliant with home exercise program.  Response to previous treatment: soreness  Functional change: ongoing    Pain: 1/10  Location: left knee     Objective     Range of motion   Flexion 110  Gravity assisted active range of motion -3  Quad lag with terminal knee extension -18    Saray participated in neuromuscular re-education activities to improve: Balance, Sense, Proprioception, and Posture for 13 minutes. The following activities were included:  Slant board x 2'  Left hamstrings stretch on step x 5  Swissball knee flexion x 20  Quad set x 10    Saray participated in dynamic functional therapeutic activities to improve functional performance for 25  minutes, including:  Biking x 7' to promote endurance with walking  Double support squats total gym x 20 to promote bending and squatting  Double support calf raises on total gym x 20 to promote reaching on tip toes  Double support leg presses 20 x 55# to simulate bending and squatting    Saray received the following direct contact modalities after being cleared for contraindications:     Home Exercises Provided and Patient Education Provided     Education provided: home exercise program     Written Home Exercises Provided:  Patient instructed to cont prior HEP.  Exercises were reviewed and Saray was able to demonstrate them prior to the end of the session.  Saray demonstrated good  understanding of the education provided.     See EMR under Patient Instructions for exercises provided prior visit.    Assessment     Patient able to perform additional therapeutic activities without c/o pain.  Saray Is progressing well towards her goals.   Pt prognosis is Excellent.     Pt will continue to benefit from skilled outpatient physical therapy to address the deficits listed in the problem list box on initial evaluation, provide pt/family education and to maximize pt's level of independence in the home and community environment.     Pt's spiritual, cultural and educational needs considered and pt agreeable to plan of care and goals.     Anticipated barriers to physical therapy: compliance with home exercise program     Goals:  Short Term Goals: 4 weeks   Pt will be independent with home ex program   Pt will be able to increase left knee range of motion -5 to 120 degrees   Be able to have -10 quad lag on left equal to right   Be able to decrease pain to 4/10 at worst      Long Term Goals: 6  weeks   Pt will be able to stoop and bend pain free   Be able to perform all housework pain free   Be able to attend [facility] and [facility] pain free .     Plan   Plan of care Certification: 7/22/2024 to 9/22/2024 .     Outpatient Physical Therapy 2 times weekly for 8 weeks to include the following interventions: Neuromuscular Re-ed, Patient Education, Therapeutic Activities, and Ultrasound. Plan of care reviewed with Michael Walton, PT.    Yamileth Cisse, PTA  8/1/2024

## 2024-08-09 ENCOUNTER — CLINICAL SUPPORT (OUTPATIENT)
Dept: REHABILITATION | Facility: HOSPITAL | Age: 69
End: 2024-08-09
Payer: COMMERCIAL

## 2024-08-09 DIAGNOSIS — M25.562 LEFT KNEE PAIN, UNSPECIFIED CHRONICITY: ICD-10-CM

## 2024-08-09 DIAGNOSIS — M25.662 DECREASED RANGE OF MOTION (ROM) OF LEFT KNEE: ICD-10-CM

## 2024-08-09 DIAGNOSIS — M70.50 PES ANSERINE BURSITIS: Primary | ICD-10-CM

## 2024-08-09 PROCEDURE — 97112 NEUROMUSCULAR REEDUCATION: CPT | Mod: PN,CQ

## 2024-08-09 PROCEDURE — 97530 THERAPEUTIC ACTIVITIES: CPT | Mod: PN,CQ

## 2024-08-16 ENCOUNTER — CLINICAL SUPPORT (OUTPATIENT)
Dept: REHABILITATION | Facility: HOSPITAL | Age: 69
End: 2024-08-16
Payer: COMMERCIAL

## 2024-08-16 DIAGNOSIS — M25.662 DECREASED RANGE OF MOTION (ROM) OF LEFT KNEE: ICD-10-CM

## 2024-08-16 DIAGNOSIS — M25.562 LEFT KNEE PAIN, UNSPECIFIED CHRONICITY: Primary | ICD-10-CM

## 2024-08-16 PROCEDURE — 97530 THERAPEUTIC ACTIVITIES: CPT | Mod: PN,CQ

## 2024-08-16 PROCEDURE — 97112 NEUROMUSCULAR REEDUCATION: CPT | Mod: PN,CQ

## 2024-08-16 NOTE — PROGRESS NOTES
Physical Therapy Treatment Note     Name: Saray Jorgensen  Clinic Number: [id number]    Therapy Diagnosis:   Encounter Diagnoses   Name Primary?    Left knee pain, unspecified chronicity Yes    Decreased range of motion (ROM) of left knee      Physician: Yeny Gillis PA    Visit Date: 8/16/2024    Physician Orders: PT Eval and Treat    Medical Diagnosis from Referral: left knee pain   Evaluation Date: 7/22/2024  Updated Plan of Care Due : 9/22/2024  [facility]   Plan of Care Expiration: 9/22/2024   Visit # / Visits authorized: 4/ 17 17 PT VISITS (INC EVAL) * 7/24/2024 - 9/22/2024   PTA Visit #: 3    Time In: 110  Time Out: 150  Total Billable Time: 40 minutes     Precautions: Standard    Subjective     Pt reports: I feel so much better in both my knees. I see the dr on the 20th, and I'll see if she wants me to do any more therapy.  She was compliant with home exercise program.  Response to previous treatment: no c/o  Functional change: ongoing    Pain: 0/10  Location: left knee     Objective     Range of motion measures:  left   Flexion   116 degrees   Extension     -3 degrees   Quad lag   -10 degrees     Saray participated in neuromuscular re-education activities to improve: Balance, Sense, Proprioception, and Posture for 12 minutes. The following activities were included:  Slant board x 2 minutes   Left hamstrings stretch on step, 3x20 second hold   Left terminal knee extension 15 x 4#    Saray participated in dynamic functional therapeutic activities to improve functional performance for 28 minutes, including:  Biking x 9' to promote endurance with walking  Double support leg presses 20 x 60# to simulate bending and squatting  Sit to stand x 10 repetitions to promote getting up from low chair  Bilateral supine straight leg raise x 10 to promote getting in and out of tub    Saray received the following direct contact modalities after being cleared for  contraindications:     Home Exercises Provided and Patient Education Provided     Education provided: continue current home exercise program, pain free; added standing BLE exercises, SLS practice and sit to stand    Written Home Exercises Provided: Patient instructed to cont prior HEP.  Exercises were reviewed and Saray was able to demonstrate them prior to the end of the session.  Saray demonstrated good  understanding of the education provided.     See EMR under Patient Instructions for exercises provided 7/22/2024    Assessment   Patient had marked improved with extension range of motion this session. Patient not using cane to walk today, but still carrying it with her.   Saray Is progressing well towards her goals.   Pt prognosis is Excellent.     Pt will continue to benefit from skilled outpatient physical therapy to address the deficits listed in the problem list box on initial evaluation, provide pt/family education and to maximize pt's level of independence in the home and community environment.      Anticipated barriers to physical therapy: compliance with home exercise program     Goals:  Short Term Goals: 4 weeks   Pt will be independent with home ex program -met  Pt will be able to increase left knee range of motion -5 to 120 degrees   Be able to have -10 quad lag on left equal to right   Be able to decrease pain to 4/10 at worst -met     Long Term Goals: 6  weeks   Pt will be able to stoop and bend pain free -met  Be able to perform all housework pain free -met  Be able to attend [facility] and [facility] pain free . -met    Plan     Plan of care Certification: 7/22/2024 to 9/22/2024 .     Outpatient Physical Therapy 2 times weekly for 8 weeks to include the following interventions: Neuromuscular Re-ed, Patient Education, Therapeutic Activities, and Ultrasound.     Yamileth Cisse, PTA  8/16/2024

## 2024-08-20 ENCOUNTER — OFFICE VISIT (OUTPATIENT)
Dept: ORTHOPEDICS | Facility: CLINIC | Age: 69
End: 2024-08-20
Payer: COMMERCIAL

## 2024-08-20 VITALS — WEIGHT: 293 LBS | HEIGHT: 70 IN | BODY MASS INDEX: 41.95 KG/M2

## 2024-08-20 DIAGNOSIS — M25.562 LEFT KNEE PAIN, UNSPECIFIED CHRONICITY: Primary | ICD-10-CM

## 2024-08-20 DIAGNOSIS — M25.552 LEFT HIP PAIN: ICD-10-CM

## 2024-08-20 PROCEDURE — 99999 PR PBB SHADOW E&M-EST. PATIENT-LVL IV: CPT | Mod: PBBFAC,,,

## 2024-08-20 PROCEDURE — 99214 OFFICE O/P EST MOD 30 MIN: CPT | Mod: PBBFAC

## 2024-08-20 PROCEDURE — 99213 OFFICE O/P EST LOW 20 MIN: CPT | Mod: S$PBB,,,

## 2024-08-20 NOTE — PROGRESS NOTES
CC:   Chief Complaint   Patient presents with    Left Hip - Follow-up    Left Knee - Follow-up          Saray Jorgensen is a 68 y.o. female seen today for follow up of Follow-up of the Left Hip and Follow-up of the Left Knee  Patient presents for a 6 week follow up of left hip and left knee pain following 6 weeks of physical therapy.  She reports a both her hip and knee have improved with physical therapy.  She reports continuing home exercises.  No new complaints today.        PAST MEDICAL HISTORY:   Past Medical History:   Diagnosis Date    Colon cancer     Diabetes     Diabetes mellitus, type 2     Gallstones     History of colonoscopy 11/03/2014    Hyperlipidemia     Hypertension         PAST SURGICAL HISTORY:   Past Surgical History:   Procedure Laterality Date    COLECTOMY, RIGHT      CYSTOSCOPY N/A 05/31/2023    Procedure: CYSTOSCOPY;  Surgeon: Nyiah King MD;  Location: [facility];  Service: OB/GYN;  Laterality: N/A;    HYSTERECTOMY      HYSTERECTOMY, TOTAL, LAPAROSCOPIC, WITH SALPINGO-OOPHORECTOMY Bilateral 05/31/2023    Procedure: HYSTERECTOMY,TOTAL,LAPAROSCOPIC,WITH SALPINGO-OOPHORECTOMY;  Surgeon: Niyah King MD;  Location: [facility] OR;  Service: OB/GYN;  Laterality: Bilateral;        ALLERGIES: Review of patient's allergies indicates:  No Known Allergies     MEDICATIONS :    Current Outpatient Medications:     ascorbic acid-multivit-min (VITAMIN C ENERGY BOOSTER) 1,000 mg PwEP, Take 1 packet by mouth once daily., Disp: , Rfl:     ascorbic acid-multivit-min (VITAMIN C FIZZY DRINK) 1,000 mg PwEP, Take 1 packet by mouth once daily., Disp: , Rfl:     aspirin 325 MG tablet, Take 1 tablet (325 mg total) by mouth once daily., Disp: 90 tablet, Rfl: 2    celecoxib (CELEBREX) 100 MG capsule, Take 1 capsule (100 mg total) by mouth once daily., Disp: 90 capsule, Rfl: 1    cetirizine (ZYRTEC) 10 MG tablet, Take 1 tablet (10 mg total) by mouth once daily., Disp: 90 tablet, Rfl:  1    citalopram (CELEXA) 20 MG tablet, Take 1 tablet (20 mg total) by mouth once daily., Disp: 90 tablet, Rfl: 1    diclofenac sodium (VOLTAREN) 1 % Gel, Apply 2 g topically 2 (two) times daily., Disp: 100 g, Rfl: 2    ergocalciferol (ERGOCALCIFEROL) 50,000 unit Cap, Take 1 capsule (50,000 Units total) by mouth every 7 days., Disp: 13 capsule, Rfl: 1    fluticasone propionate (FLONASE) 50 mcg/actuation nasal spray, 1 spray by Each Nostril route once daily., Disp: , Rfl:     furosemide (LASIX) 40 MG tablet, Take 1 tablet (40 mg total) by mouth once daily., Disp: 90 tablet, Rfl: 1    GARLIC ORAL, Take 1 tablet by mouth once daily., Disp: , Rfl:     glipiZIDE 5 MG TR24, TAKE ONE TABLET BY MOUTH ONCE DAILY WITH BREAKFAST, Disp: 90 tablet, Rfl: 1    hydroCHLOROthiazide (HYDRODIURIL) 12.5 MG Tab, Take 1-2 tablets (12.5-25 mg total) by mouth once daily., Disp: 180 tablet, Rfl: 1    JANUMET -1,000 mg TM24, Take 1 tablet by mouth once daily., Disp: 90 tablet, Rfl: 1    lisinopriL (PRINIVIL,ZESTRIL) 40 MG tablet, Take 1 tablet (40 mg total) by mouth once daily., Disp: 90 tablet, Rfl: 1    multivitamin (THERAGRAN) per tablet, Take 1 tablet by mouth once daily., Disp: , Rfl:     olopatadine (PATADAY) 0.2 % Drop, , Disp: , Rfl:     simvastatin (ZOCOR) 40 MG tablet, Take 1 tablet (40 mg total) by mouth every evening., Disp: 90 tablet, Rfl: 1     SOCIAL HISTORY:   Social History     Socioeconomic History    Marital status:     Number of children: 5   Occupational History     Comment: Disabled   Tobacco Use    Smoking status: Never     Passive exposure: Never    Smokeless tobacco: Never   Substance and Sexual Activity    Alcohol use: Never    Drug use: Never    Sexual activity: Not Currently     Partners: Male     Birth control/protection: See Surgical Hx   Social History Narrative    Lives in home with family        FAMILY HISTORY:   Family History   Problem Relation Name Age of Onset    Diabetes Mother       Hypertension Mother      Prostate cancer Father      Diabetes Sister      Hypertension Sister      Diabetes Brother      Hypertension Brother            PHYSICAL EXAM:      There were no vitals filed for this visit.  Body mass index is 42.62 kg/m².    GENERAL: Well-developed, well-nourished female . The patient is alert, oriented and cooperative.    EXTREMITIES:  Left hip and knee with skin clean dry and intact, nontender to palpation, good range of motion on exam today, neurovascularly intact      RADIOGRAPHIC FINDINGS:   No results found.     Patient Active Problem List    Diagnosis Date Noted    Left knee pain 08/16/2024    Decreased range of motion (ROM) of left knee 08/16/2024    Fall 07/11/2024    Arthritis of right hip 03/21/2024    Osteoarthritis of both knees 03/21/2024    Status post laparoscopic hysterectomy 05/31/2023    Cyst of right ovary 04/04/2023    Vitamin B 12 deficiency 04/04/2023    Recurrent major depressive disorder, in full remission 04/04/2023    Hyperlipidemia 04/04/2023    Colon cancer 05/27/2022    Seasonal allergic rhinitis due to pollen 06/17/2021    Essential hypertension 06/17/2021    Type 2 diabetes mellitus without complication, without long-term current use of insulin 06/17/2021     IMPRESSION AND PLAN:  Left hip and knee pain continue to improve with the use of over-the-counter anti-inflammatory medications and physical therapy.  Recommend continuing physical therapy until completion of program.  Follow up with the orthopedic clinic as needed.        No follow-ups on file.       Yeny Gillis PA-C      (Subject to voice recognition error, transcription service not allowed)

## 2024-08-28 ENCOUNTER — CLINICAL SUPPORT (OUTPATIENT)
Dept: REHABILITATION | Facility: HOSPITAL | Age: 69
End: 2024-08-28
Payer: COMMERCIAL

## 2024-08-28 DIAGNOSIS — M25.662 DECREASED RANGE OF MOTION (ROM) OF LEFT KNEE: Primary | ICD-10-CM

## 2024-08-28 PROCEDURE — 97530 THERAPEUTIC ACTIVITIES: CPT | Mod: PN,CQ

## 2024-08-28 PROCEDURE — 97112 NEUROMUSCULAR REEDUCATION: CPT | Mod: PN,CQ

## 2024-08-28 NOTE — PROGRESS NOTES
Physical Therapy Treatment Note     Name: Saray Jorgensen  Clinic Number: [id number]    Therapy Diagnosis:   Encounter Diagnosis   Name Primary?    Decreased range of motion (ROM) of left knee Yes     Physician: Yeny Gillis PA    Visit Date: 8/28/2024    Physician Orders: PT Eval and Treat    Medical Diagnosis from Referral: left knee pain   Evaluation Date: 7/22/2024  Updated Plan of Care Due : 9/22/2024  Authorization Period Expiration: [employer] see benefits   Plan of Care Expiration: 9/22/2024   Visit # / Visits authorized: 5/ 17  17 PT VISITS (INC EVAL) * 7/24/2024 - 9/22/2024   PTA Visit #: 4    Time In: 105  Time Out: 145  Total Billable Time: 40 minutes     Precautions: Standard    Subjective     Pt reports: today will be my last time, I got a good report from  and I'm having issues with m[employer] transportation   She was compliant with home exercise program.  Response to previous treatment: no c/o  Functional change: ongoing    Pain: 0/10  Location: left knee     Objective     Range of motion measures:  left   Flexion   118 degrees   Extension     -3 degrees   Quad lag   -10 degrees     Saray participated in neuromuscular re-education activities to improve: Balance, Sense, Proprioception, and Posture for 12 minutes. The following activities were included:  Slant board x 2 minutes   Left hamstrings stretch on step, 3x20 second hold   Left terminal knee extension 15 x 4#  Hip adduction with bolster x 10    Saray participated in dynamic functional therapeutic activities to improve functional performance for 28 minutes, including:  Biking x 9' to promote endurance with walking  Double support leg presses 20 x 60# to simulate bending and squatting  Sit to stand x 10 repetitions to promote getting up from low chair  Bilateral supine straight leg raise x 10 to promote getting in and out of tub  Swissball trunk flexion rollouts x 10 to simulate donning/doffing shoes    Saray received the following direct  contact modalities after being cleared for contraindications:     Home Exercises Provided and Patient Education Provided     Education provided: continue current home exercise program, pain free; added standing BLE exercises, SLS practice and sit to stand    Written Home Exercises Provided: Patient instructed to cont prior HEP.  Exercises were reviewed and Saray was able to demonstrate them prior to the end of the session.  Saray demonstrated good  understanding of the education provided.     See EMR under Patient Instructions for exercises provided 7/22/2024    Assessment   Patient instructed to be consistent with home exercise program, has progressed well the therapy and met all but one goal outlined in evaluation.  Saray Is progressing well towards her goals.   Pt prognosis is Excellent.     Pt will continue to benefit from skilled outpatient physical therapy to address the deficits listed in the problem list box on initial evaluation, provide pt/family education and to maximize pt's level of independence in the home and community environment.      Anticipated barriers to physical therapy: compliance with home exercise program     Goals:  Short Term Goals: 4 weeks   Pt will be independent with home ex program -met  Pt will be able to increase left knee range of motion -5 to 120 degrees   Be able to have -10 quad lag on left equal to right -met  Be able to decrease pain to 4/10 at worst -met     Long Term Goals: 6  weeks   Pt will be able to stoop and bend pain free -met  Be able to perform all housework pain free -met  [place] [facility] [place]met    Plan   Will d/c to home exercise program, physical therapist d/c note.    Yamileth Cisse, PTA  8/28/2024

## 2024-08-29 PROBLEM — M25.662 DECREASED RANGE OF MOTION (ROM) OF LEFT KNEE: Status: RESOLVED | Noted: 2024-08-16 | Resolved: 2024-08-29

## 2024-08-29 NOTE — PLAN OF CARE
Physical Therapy Treatment Note      Name: Saray Jorgensen  Clinic Number: [id number]     Therapy Diagnosis:        Encounter Diagnosis   Name Primary?    Decreased range of motion (ROM) of left knee Yes      Physician: Yeny Gillis PA     Visit Date: 8/28/2024     Physician Orders: PT Eval and Treat    Medical Diagnosis from Referral: left knee pain   Evaluation Date: 7/22/2024  Updated Plan of Care Due : 9/22/2024  Authorization Period Expiration: [employer] see benefits   Plan of Care Expiration: 9/22/2024   Visit # / Visits authorized: 5/ 17  17 PT VISITS (INC EVAL) * 7/24/2024 - 9/22/2024   PTA Visit #: 4     Time In: 105  Time Out: 145  Total Billable Time: 40 minutes      Precautions: Standard     Subjective      Pt reports: today will be my last time, I got a good report from  and I'm having issues with m[employer] transportation   She was compliant with home exercise program.  Response to previous treatment: no c/o  Functional change: ongoing     Pain: 0/10  Location: left knee      Objective      Range of motion measures:  left   Flexion              118 degrees   Extension           -3 degrees   Quad lag          -10 degrees      Saray participated in neuromuscular re-education activities to improve: Balance, Sense, Proprioception, and Posture for 12 minutes. The following activities were included:  Slant board x 2 minutes   Left hamstrings stretch on step, 3x20 second hold   Left terminal knee extension 15 x 4#  Hip adduction with bolster x 10     Saray participated in dynamic functional therapeutic activities to improve functional performance for 28 minutes, including:  Biking x 9' to promote endurance with walking  Double support leg presses 20 x 60# to simulate bending and squatting  Sit to stand x 10 repetitions to promote getting up from low chair  Bilateral supine straight leg raise x 10 to promote getting in and out of tub  Swissball trunk flexion rollouts x 10 to simulate donning/doffing  shoes     Saray received the following direct contact modalities after being cleared for contraindications:      Home Exercises Provided and Patient Education Provided      Education provided: continue current home exercise program, pain free; added standing BLE exercises, SLS practice and sit to stand     Written Home Exercises Provided: Patient instructed to cont prior HEP.  Exercises were reviewed and Saray was able to demonstrate them prior to the end of the session.  Saray demonstrated good  understanding of the education provided.      See EMR under Patient Instructions for exercises provided 7/22/2024     Assessment   Patient instructed to be consistent with home exercise program, has progressed well the therapy and met all but one goal outlined in evaluation.  Saray Is progressing well towards her goals.   Pt prognosis is Excellent.      Pt will continue to benefit from skilled outpatient physical therapy to address the deficits listed in the problem list box on initial evaluation, provide pt/family education and to maximize pt's level of independence in the home and community environment.      Anticipated barriers to physical therapy: compliance with home exercise program      Goals:  Short Term Goals: 4 weeks   Pt will be independent with home ex program -met  Pt will be able to increase left knee range of motion -5 to 120 degrees   Be able to have -10 quad lag on left equal to right -met  Be able to decrease pain to 4/10 at worst -met     Long Term Goals: 6  weeks   Pt will be able to stoop and bend pain free -met  Be able to perform all housework pain free -met  [facility] . -met     Plan          Outpatient Therapy Discharge Summary     Name: Saray Jorgensen  Clinic Number: [id number]    Therapy Diagnosis:   Encounter Diagnosis   Name Primary?    Decreased range of motion (ROM) of left knee Yes     Physician: Yeny Gillis PA       Assessment    Goals:  Pt met goals      Discharge reason: Patient has met all of his/her goals, pt requested d/c     Plan   This patient is discharged from Physical Therapy.   Michael Walton, PT

## 2024-10-23 ENCOUNTER — CLINICAL SUPPORT (OUTPATIENT)
Dept: FAMILY MEDICINE | Facility: CLINIC | Age: 69
End: 2024-10-23
Payer: COMMERCIAL

## 2024-10-23 DIAGNOSIS — Z23 IMMUNIZATION DUE: Primary | ICD-10-CM

## 2024-10-23 PROCEDURE — G0008 ADMIN INFLUENZA VIRUS VAC: HCPCS | Mod: ,,, | Performed by: NURSE PRACTITIONER

## 2024-10-23 PROCEDURE — 90653 IIV ADJUVANT VACCINE IM: CPT | Mod: ,,, | Performed by: NURSE PRACTITIONER

## 2024-10-23 NOTE — PROGRESS NOTES
Pt came into clinic today for annual flu immunization. Verified pt's name and  before administering. Admnistered FluAd for 65 years and older in pt's right deltoid IM. Pt tolerated well. Refused to wait shot time in clinic. Stated she had never had a reaction before. Educated on signs to watch for. Verbalized understanding.

## 2024-11-13 ENCOUNTER — HOSPITAL ENCOUNTER (OUTPATIENT)
Dept: RADIOLOGY | Facility: HOSPITAL | Age: 69
Discharge: HOME | End: 2024-11-13
Attending: NURSE PRACTITIONER
Payer: COMMERCIAL

## 2024-11-13 VITALS — HEIGHT: 70 IN | WEIGHT: 293 LBS | BODY MASS INDEX: 41.95 KG/M2

## 2024-11-13 DIAGNOSIS — Z12.31 BREAST CANCER SCREENING BY MAMMOGRAM: ICD-10-CM

## 2024-11-13 PROCEDURE — 77067 SCR MAMMO BI INCL CAD: CPT | Mod: TC

## 2024-11-13 PROCEDURE — 77063 BREAST TOMOSYNTHESIS BI: CPT | Mod: 26,,, | Performed by: RADIOLOGY

## 2024-11-13 PROCEDURE — 77067 SCR MAMMO BI INCL CAD: CPT | Mod: 26,,, | Performed by: RADIOLOGY

## 2024-11-21 ENCOUNTER — OFFICE VISIT (OUTPATIENT)
Dept: FAMILY MEDICINE | Facility: CLINIC | Age: 69
End: 2024-11-21
Payer: COMMERCIAL

## 2024-11-21 ENCOUNTER — TELEPHONE (OUTPATIENT)
Dept: FAMILY MEDICINE | Facility: CLINIC | Age: 69
End: 2024-11-21
Payer: COMMERCIAL

## 2024-11-21 VITALS
HEART RATE: 106 BPM | OXYGEN SATURATION: 96 % | RESPIRATION RATE: 18 BRPM | HEIGHT: 70 IN | TEMPERATURE: 98 F | DIASTOLIC BLOOD PRESSURE: 84 MMHG | SYSTOLIC BLOOD PRESSURE: 162 MMHG | BODY MASS INDEX: 41.95 KG/M2 | WEIGHT: 293 LBS

## 2024-11-21 DIAGNOSIS — M25.551 RIGHT HIP PAIN: ICD-10-CM

## 2024-11-21 DIAGNOSIS — M16.11 ARTHRITIS OF RIGHT HIP: Primary | ICD-10-CM

## 2024-11-21 PROCEDURE — 99213 OFFICE O/P EST LOW 20 MIN: CPT | Mod: 25,,,

## 2024-11-21 PROCEDURE — 96372 THER/PROPH/DIAG INJ SC/IM: CPT | Mod: ,,,

## 2024-11-21 RX ORDER — DEXAMETHASONE SODIUM PHOSPHATE 4 MG/ML
4 INJECTION, SOLUTION INTRA-ARTICULAR; INTRALESIONAL; INTRAMUSCULAR; INTRAVENOUS; SOFT TISSUE
Status: COMPLETED | OUTPATIENT
Start: 2024-11-21 | End: 2024-11-21

## 2024-11-21 RX ORDER — SITAGLIPTIN AND METFORMIN HYDROCHLORIDE 1000; 50 MG/1; MG/1
1 TABLET, FILM COATED, EXTENDED RELEASE ORAL DAILY
COMMUNITY

## 2024-11-21 RX ORDER — ASPIRIN 500 MG
500 TABLET, DELAYED RELEASE (ENTERIC COATED) ORAL DAILY
COMMUNITY

## 2024-11-21 RX ORDER — KETOROLAC TROMETHAMINE 30 MG/ML
30 INJECTION, SOLUTION INTRAMUSCULAR; INTRAVENOUS
Status: COMPLETED | OUTPATIENT
Start: 2024-11-21 | End: 2024-11-21

## 2024-11-21 RX ADMIN — KETOROLAC TROMETHAMINE 30 MG: 30 INJECTION, SOLUTION INTRAMUSCULAR; INTRAVENOUS at 10:11

## 2024-11-21 RX ADMIN — DEXAMETHASONE SODIUM PHOSPHATE 4 MG: 4 INJECTION, SOLUTION INTRA-ARTICULAR; INTRALESIONAL; INTRAMUSCULAR; INTRAVENOUS; SOFT TISSUE at 09:11

## 2024-11-21 NOTE — ASSESSMENT & PLAN NOTE
Toradol and decadron IM today. RTC with worsening, new or persistent symptoms with understanding voiced

## 2024-11-21 NOTE — TELEPHONE ENCOUNTER
Pt ask for a call back to change her pharmacy back to [facility] I took scare of those changes

## 2024-11-21 NOTE — PROGRESS NOTES
[facility]   [place] [id number]  [place]  [address]      PATIENT NAME: Saray Jorgensen  : 1955  DATE: 24  MRN: [id number]        Reason for Visit / Chief Complaint: Hip Pain (Pt reports right hip pain for a week says it gets worse when sitting and standing )         History of Present Illness / Problem Focused Workflow     68 y/o female presents to clinic with complaints of right hip pain that started about a week ago. She denies any injury. Pt states pain worse with sitting and standing. Rates 7/10 today      Review of Systems     @Review of Systems   Constitutional:  Negative for chills, fatigue and fever.   HENT:  Negative for nasal congestion, ear discharge, ear pain, rhinorrhea, sinus pressure/congestion and sore throat.    Respiratory:  Negative for cough, chest tightness, shortness of breath, wheezing and stridor.    Cardiovascular:  Negative for palpitations and claudication.   Gastrointestinal:  Negative for abdominal pain, constipation, diarrhea, nausea, vomiting and reflux.   Genitourinary:  Negative for dysuria, flank pain, frequency, hematuria and urgency.   Musculoskeletal:  Positive for joint swelling (right hip pain). Negative for myalgias.   Neurological:  Negative for dizziness, weakness, light-headedness and headaches.   Psychiatric/Behavioral:  Negative for suicidal ideas.        Medical / Social / Family History     Past Medical History:   Diagnosis Date    Colon cancer     Diabetes     Diabetes mellitus, type 2     Gallstones     History of colonoscopy 2014    Hyperlipidemia     Hypertension        Past Surgical History:   Procedure Laterality Date    COLECTOMY, RIGHT      CYSTOSCOPY N/A 2023    Procedure: CYSTOSCOPY;  Surgeon: Niyah King MD;  Location: [facility];  Service: OB/GYN;  Laterality: N/A;    HYSTERECTOMY      HYSTERECTOMY, TOTAL, LAPAROSCOPIC, WITH SALPINGO-OOPHORECTOMY Bilateral 2023    Procedure:  HYSTERECTOMY,TOTAL,LAPAROSCOPIC,WITH SALPINGO-OOPHORECTOMY;  Surgeon: Niyah King MD;  Location: [facility];  Service: OB/GYN;  Laterality: Bilateral;           Medications and Allergies     Medications  Outpatient Medications Marked as Taking for the 11/21/24 encounter (Office Visit) with Mikhail Osorio FNP   Medication Sig Dispense Refill    ascorbic acid-multivit-min (VITAMIN C ENERGY BOOSTER) 1,000 mg PwEP Take 1 packet by mouth once daily.      ascorbic acid-multivit-min (VITAMIN C FIZZY DRINK) 1,000 mg PwEP Take 1 packet by mouth once daily.      aspirin 325 MG tablet Take 1 tablet (325 mg total) by mouth once daily. 90 tablet 2    celecoxib (CELEBREX) 100 MG capsule Take 1 capsule (100 mg total) by mouth once daily. 90 capsule 1    cetirizine (ZYRTEC) 10 MG tablet Take 1 tablet (10 mg total) by mouth once daily. 90 tablet 1    citalopram (CELEXA) 20 MG tablet Take 1 tablet (20 mg total) by mouth once daily. 90 tablet 1    diclofenac sodium (VOLTAREN) 1 % Gel Apply 2 g topically 2 (two) times daily. 100 g 2    ergocalciferol (ERGOCALCIFEROL) 50,000 unit Cap Take 1 capsule (50,000 Units total) by mouth every 7 days. 13 capsule 1    fluticasone propionate (FLONASE) 50 mcg/actuation nasal spray 1 spray by Each Nostril route once daily.      furosemide (LASIX) 40 MG tablet Take 1 tablet (40 mg total) by mouth once daily. 90 tablet 1    GARLIC ORAL Take 1 tablet by mouth once daily.      glipiZIDE 5 MG TR24 TAKE ONE TABLET BY MOUTH ONCE DAILY WITH BREAKFAST 90 tablet 1    JANUMET -1,000 mg TM24 Take 1 tablet by mouth once daily. 90 tablet 1    lisinopriL (PRINIVIL,ZESTRIL) 40 MG tablet Take 1 tablet (40 mg total) by mouth once daily. 90 tablet 1    multivitamin (THERAGRAN) per tablet Take 1 tablet by mouth once daily.      olopatadine (PATADAY) 0.2 % Drop       simvastatin (ZOCOR) 40 MG tablet Take 1 tablet (40 mg total) by mouth every evening. 90 tablet 1     Current Facility-Administered  Medications for the 11/21/24 encounter (Office Visit) with Mikhail Osorio FNP   Medication Dose Route Frequency Provider Last Rate Last Admin    [COMPLETED] dexAMETHasone injection 4 mg  4 mg Intramuscular 1 time in Clinic/HOD Mikhail Osorio FNP   4 mg at 11/21/24 0959    [COMPLETED] ketorolac injection 30 mg  30 mg Intramuscular 1 time in Clinic/HOD Mikhail Osorio FNP   30 mg at 11/21/24 1000       Allergies  Review of patient's allergies indicates:  No Known Allergies    Physical Examination     Vitals:    11/21/24 0919   BP: (!) 162/84   Pulse: 106   Resp: 18   Temp: 97.6 °F (36.4 °C)     Physical Exam  Vitals and nursing note reviewed.   Constitutional:       General: She is awake.      Appearance: Normal appearance.   HENT:      Head: Normocephalic.      Right Ear: Tympanic membrane, ear canal and external ear normal.      Left Ear: Tympanic membrane, ear canal and external ear normal.      Nose: Nose normal.      Mouth/Throat:      Lips: Pink.      Mouth: Mucous membranes are moist.      Pharynx: Oropharynx is clear. Uvula midline.   Cardiovascular:      Rate and Rhythm: Normal rate and regular rhythm.      Heart sounds: Normal heart sounds, S1 normal and S2 normal.   Pulmonary:      Effort: Pulmonary effort is normal. No respiratory distress.      Breath sounds: Normal breath sounds. No decreased breath sounds, wheezing, rhonchi or rales.   Abdominal:      General: Bowel sounds are normal.      Palpations: Abdomen is soft.      Tenderness: There is no abdominal tenderness.   Musculoskeletal:      Cervical back: Normal range of motion.   Skin:     General: Skin is warm.      Capillary Refill: Capillary refill takes less than 2 seconds.   Neurological:      Mental Status: She is alert and oriented to person, place, and time.   Psychiatric:         Thought Content: Thought content does not include homicidal or suicidal ideation. Thought content does not include homicidal or suicidal plan.                Procedures   Assessment and Plan (including Health Maintenance)   :      Problem List Items Addressed This Visit       Arthritis of right hip - Primary    Current Assessment & Plan     Toradol and decadron IM today. RTC with worsening, new or persistent symptoms with understanding voiced         Relevant Medications    ketorolac injection 30 mg (Completed)    dexAMETHasone injection 4 mg (Completed)     Other Visit Diagnoses       Right hip pain        Relevant Medications    ketorolac injection 30 mg (Completed)    dexAMETHasone injection 4 mg (Completed)            Health Maintenance Topics with due status: Not Due       Topic Last Completion Date    Colorectal Cancer Screening 12/18/2015    DEXA Scan 04/21/2022    Eye Exam 03/18/2024    Diabetes Urine Screening 06/20/2024    Foot Exam 06/20/2024    Lipid Panel 06/20/2024    Mammogram 11/13/2024    Low Dose Statin 11/21/2024       Future Appointments   Date Time Provider Department Center   12/18/2024  9:40 AM Kailee Garcia FNP [facility]   11/19/2025  1:00 PM [facility]        Health Maintenance Due   Topic Date Due    COVID-19 Vaccine (1) Never done    TETANUS VACCINE  Never done    Shingles Vaccine (1 of 2) Never done    RSV Vaccine (Age 60+ and Pregnant patients) (1 - Risk 60-74 years 1-dose series) Never done    Hemoglobin A1c  12/20/2024        Follow up if symptoms worsen or fail to improve.     Signature:  DELIFNA CEJA  [facility]  [id number] [place] [address], [place]  [address]    Date of encounter: 11/21/24

## 2024-12-23 ENCOUNTER — OFFICE VISIT (OUTPATIENT)
Dept: FAMILY MEDICINE | Facility: CLINIC | Age: 69
End: 2024-12-23
Payer: COMMERCIAL

## 2024-12-23 VITALS
TEMPERATURE: 97 F | RESPIRATION RATE: 18 BRPM | SYSTOLIC BLOOD PRESSURE: 136 MMHG | HEIGHT: 70 IN | OXYGEN SATURATION: 98 % | DIASTOLIC BLOOD PRESSURE: 80 MMHG | WEIGHT: 293 LBS | BODY MASS INDEX: 41.95 KG/M2 | HEART RATE: 105 BPM

## 2024-12-23 DIAGNOSIS — L03.116 CELLULITIS OF LEFT LOWER EXTREMITY: Primary | ICD-10-CM

## 2024-12-23 DIAGNOSIS — L28.2 PRURITIC RASH: ICD-10-CM

## 2024-12-23 PROCEDURE — 99213 OFFICE O/P EST LOW 20 MIN: CPT | Mod: 25,,, | Performed by: NURSE PRACTITIONER

## 2024-12-23 PROCEDURE — 96372 THER/PROPH/DIAG INJ SC/IM: CPT | Mod: ,,, | Performed by: NURSE PRACTITIONER

## 2024-12-23 RX ORDER — TRIAMCINOLONE ACETONIDE 1 MG/G
CREAM TOPICAL 2 TIMES DAILY
Qty: 80 G | Refills: 1 | Status: SHIPPED | OUTPATIENT
Start: 2024-12-23

## 2024-12-23 RX ORDER — CEPHALEXIN 500 MG/1
500 CAPSULE ORAL EVERY 8 HOURS
Qty: 30 CAPSULE | Refills: 0 | Status: SHIPPED | OUTPATIENT
Start: 2024-12-23

## 2024-12-23 RX ORDER — CEFTRIAXONE 1 G/1
1 INJECTION, POWDER, FOR SOLUTION INTRAMUSCULAR; INTRAVENOUS
Status: COMPLETED | OUTPATIENT
Start: 2024-12-23 | End: 2024-12-23

## 2024-12-23 RX ADMIN — CEFTRIAXONE 1 G: 1 INJECTION, POWDER, FOR SOLUTION INTRAMUSCULAR; INTRAVENOUS at 01:12

## 2024-12-23 NOTE — ASSESSMENT & PLAN NOTE
Redness, edema, swelling to left lower ext. Discussed treatment options/risks/benefits; patient voices understanding. [place] IM today. Avoid sodium and added salt. Elevate lower ext. Start keflex po TID. Kenalog for vasculitic rash.   Unable to get x-ray of lower ext today in clinic.

## 2024-12-23 NOTE — PROGRESS NOTES
DELFINA Clark   [facility] [address] [address]  [phone]      PATIENT NAME: Saray Jorgensen  : 1955  DATE: 24  MRN: [id number]      Billing Provider: DELFINA Clark  Level of Service:   Patient PCP Information       Provider PCP Type    DELFINA Clark General            Reason for Visit / Chief Complaint: Foot Pain (LLE pain/edema/redness X6 days./)       Update PCP  Update Chief Complaint         History of Present Illness / Problem Focused Workflow     69 year old female presents with complaints of redness, edema, swelling to left lower ext.   Reports symptoms started last week and has progressively gotten worse. No open area or drainage noted.   Complaints of n/v/d last week that has now resolved.     Review of Systems     Review of Systems   Constitutional:  Negative for fatigue and fever.   HENT:  Negative for congestion.    Respiratory:  Negative for cough and shortness of breath.    Cardiovascular:  Positive for leg swelling. Negative for palpitations.   Gastrointestinal:  Positive for diarrhea, nausea and vomiting. Negative for abdominal pain and constipation.   Endocrine: Negative for polydipsia and polyuria.   Musculoskeletal:  Positive for arthralgias, gait problem and myalgias.   Neurological:  Negative for dizziness, weakness and headaches.   Psychiatric/Behavioral:  Negative for agitation and dysphoric mood.        Medical / Social / Family History     Past Medical History:   Diagnosis Date    Colon cancer     Diabetes     Diabetes mellitus, type 2     Gallstones     History of colonoscopy 2014    Hyperlipidemia     Hypertension        Past Surgical History:   Procedure Laterality Date    COLECTOMY, RIGHT      CYSTOSCOPY N/A 2023    Procedure: CYSTOSCOPY;  Surgeon: Niyah King MD;  Location: [facility];  Service: OB/GYN;  Laterality: N/A;    HYSTERECTOMY      HYSTERECTOMY,  TOTAL, LAPAROSCOPIC, WITH SALPINGO-OOPHORECTOMY Bilateral 05/31/2023    Procedure: HYSTERECTOMY,TOTAL,LAPAROSCOPIC,WITH SALPINGO-OOPHORECTOMY;  Surgeon: Niyah King MD;  Location: [facility];  Service: OB/GYN;  Laterality: Bilateral;       Social History  Ms.  reports that she has never smoked. She has never been exposed to tobacco smoke. She has never used smokeless tobacco. She reports that she does not drink alcohol and does not use drugs.    Family History  Ms.'s family history includes Diabetes in her brother, mother, and sister; Hypertension in her brother, mother, and sister; Prostate cancer in her father.    Medications and Allergies     Medications  Outpatient Medications Marked as Taking for the 12/23/24 encounter (Office Visit) with Kailee Garcia FNP   Medication Sig Dispense Refill    ascorbic acid-multivit-min (VITAMIN C ENERGY BOOSTER) 1,000 mg PwEP Take 1 packet by mouth once daily.      ascorbic acid-multivit-min (VITAMIN C FIZZY DRINK) 1,000 mg PwEP Take 1 packet by mouth once daily.      aspirin 325 MG tablet Take 1 tablet (325 mg total) by mouth once daily. 90 tablet 2    aspirin 500 MG EC tablet Take 500 mg by mouth once daily.      celecoxib (CELEBREX) 100 MG capsule Take 1 capsule (100 mg total) by mouth once daily. 90 capsule 1    cetirizine (ZYRTEC) 10 MG tablet Take 1 tablet (10 mg total) by mouth once daily. 90 tablet 1    citalopram (CELEXA) 20 MG tablet Take 1 tablet (20 mg total) by mouth once daily. 90 tablet 1    diclofenac sodium (VOLTAREN) 1 % Gel Apply 2 g topically 2 (two) times daily. 100 g 2    ergocalciferol (ERGOCALCIFEROL) 50,000 unit Cap Take 1 capsule (50,000 Units total) by mouth every 7 days. 13 capsule 1    fluticasone propionate (FLONASE) 50 mcg/actuation nasal spray 1 spray by Each Nostril route once daily.      furosemide (LASIX) 40 MG tablet Take 1 tablet (40 mg total) by mouth once daily. 90 tablet 1    GARLIC ORAL Take 1 tablet by mouth once daily.       glipiZIDE 5 MG TR24 TAKE ONE TABLET BY MOUTH ONCE DAILY WITH BREAKFAST 90 tablet 1    hydroCHLOROthiazide (HYDRODIURIL) 12.5 MG Tab Take 1-2 tablets (12.5-25 mg total) by mouth once daily. 180 tablet 1    JANUMET -1,000 mg TM24 Take 1 tablet by mouth once daily. 90 tablet 1    lisinopriL (PRINIVIL,ZESTRIL) 40 MG tablet Take 1 tablet (40 mg total) by mouth once daily. 90 tablet 1    multivitamin (THERAGRAN) per tablet Take 1 tablet by mouth once daily.      olopatadine (PATADAY) 0.2 % Drop       simvastatin (ZOCOR) 40 MG tablet Take 1 tablet (40 mg total) by mouth every evening. 90 tablet 1    SITagliptan-metformin (JANUMET XR) 50-1,000 mg TM24 Take 1 tablet by mouth once daily.       Current Facility-Administered Medications for the 12/23/24 encounter (Office Visit) with Kailee Garcia FNP   Medication Dose Route Frequency Provider Last Rate Last Admin    [COMPLETED] cefTRIAXone injection 1 g  1 g Intramuscular 1 time in Clinic/HOD Kailee Garcia FNP   1 g at 12/23/24 1343       Allergies  Review of patient's allergies indicates:  No Known Allergies    Physical Examination     Vitals:    12/23/24 1308   BP: 136/80   Pulse: 105   Resp: 18   Temp: 97.2 °F (36.2 °C)     Physical Exam  Constitutional:       General: She is not in acute distress.     Appearance: She is obese.   HENT:      Head: Normocephalic.      Nose: Nose normal.      Mouth/Throat:      Mouth: Mucous membranes are moist.   Eyes:      Extraocular Movements: Extraocular movements intact.   Cardiovascular:      Rate and Rhythm: Normal rate.   Pulmonary:      Effort: Pulmonary effort is normal. No respiratory distress.   Abdominal:      General: Bowel sounds are normal.      Palpations: Abdomen is soft.   Musculoskeletal:         General: Normal range of motion.      Cervical back: Normal range of motion.      Right lower leg: Edema present.      Left lower leg: Edema present.   Skin:     General: Skin is warm.      Findings: Erythema (left lower  ext) and rash present.   Neurological:      Mental Status: She is alert and oriented to person, place, and time.   Psychiatric:         Behavior: Behavior normal.           Imaging / Labs     No visits with results within 1 Day(s) from this visit.   Latest known visit with results is:   Office Visit on 06/20/2024   Component Date Value Ref Range Status    Creatinine, Urine 06/20/2024 88  28 - 219 mg/dL Final    Microalbumin 06/20/2024 0.6  0.0 - 2.8 mg/dL Final    Microalbumin/Creatinine Ratio 06/20/2024 6.8  0.0 - 30.0 mg/g Final    Hemoglobin A1C 06/20/2024 6.2  4.5 - 6.6 % Final    Estimated Average Glucose 06/20/2024 131  mg/dL Final    Sodium 06/20/2024 137  136 - 145 mmol/L Final    Potassium 06/20/2024 4.9  3.5 - 5.1 mmol/L Final    Chloride 06/20/2024 103  98 - 107 mmol/L Final    CO2 06/20/2024 28  21 - 32 mmol/L Final    Anion Gap 06/20/2024 11  7 - 16 mmol/L Final    Glucose 06/20/2024 193 (H)  74 - 106 mg/dL Final    BUN 06/20/2024 16  7 - 18 mg/dL Final    Creatinine 06/20/2024 0.85  0.55 - 1.02 mg/dL Final    BUN/Creatinine Ratio 06/20/2024 19  6 - 20 Final    Calcium 06/20/2024 9.3  8.5 - 10.1 mg/dL Final    Total Protein 06/20/2024 7.9  6.4 - 8.2 g/dL Final    Albumin 06/20/2024 3.5  3.5 - 5.0 g/dL Final    Globulin 06/20/2024 4.4 (H)  2.0 - 4.0 g/dL Final    A/G Ratio 06/20/2024 0.8   Final    Bilirubin, Total 06/20/2024 0.3  >0.0 - 1.2 mg/dL Final    Alk Phos 06/20/2024 78  55 - 142 U/L Final    ALT 06/20/2024 16  13 - 56 U/L Final    AST 06/20/2024 9 (L)  15 - 37 U/L Final    eGFR 06/20/2024 75  >=60 mL/min/1.73m2 Final    Triglycerides 06/20/2024 94  35 - 150 mg/dL Final    Cholesterol 06/20/2024 172  0 - 200 mg/dL Final    HDL Cholesterol 06/20/2024 66 (H)  40 - 60 mg/dL Final    Cholesterol/HDL Ratio (Risk Factor) 06/20/2024 2.6   Final    Non-HDL 06/20/2024 106  mg/dL Final    LDL Calculated 06/20/2024 87  mg/dL Final    LDL/HDL 06/20/2024 1.3   Final    VLDL 06/20/2024 19  mg/dL Final     Vitamin B12 06/20/2024 996 (H)  193 - 986 pg/mL Final    Folate 06/20/2024 >20.0 (H)  3.1 - 17.5 ng/mL Final    Vitamin D 25-Hydroxy, Blood 06/20/2024 83.4  ng/mL Final    WBC 06/20/2024 7.92  4.50 - 11.00 K/uL Final    RBC 06/20/2024 3.96 (L)  4.20 - 5.40 M/uL Final    Hemoglobin 06/20/2024 11.8 (L)  12.0 - 16.0 g/dL Final    Hematocrit 06/20/2024 39.4  38.0 - 47.0 % Final    MCV 06/20/2024 99.5 (H)  80.0 - 96.0 fL Final    MCH 06/20/2024 29.8  27.0 - 31.0 pg Final    MCHC 06/20/2024 29.9 (L)  32.0 - 36.0 g/dL Final    RDW 06/20/2024 12.4  11.5 - 14.5 % Final    Platelet Count 06/20/2024 254  150 - 400 K/uL Final    MPV 06/20/2024 11.0  9.4 - 12.4 fL Final    Neutrophils % 06/20/2024 58.9  53.0 - 65.0 % Final    Lymphocytes % 06/20/2024 30.4  27.0 - 41.0 % Final    Monocytes % 06/20/2024 4.9  2.0 - 6.0 % Final    Eosinophils % 06/20/2024 4.7 (H)  1.0 - 4.0 % Final    Basophils % 06/20/2024 0.8  0.0 - 1.0 % Final    Immature Granulocytes % 06/20/2024 0.3  0.0 - 0.4 % Final    nRBC, Auto 06/20/2024 0.0  <=0.0 % Final    Neutrophils, Abs 06/20/2024 4.67  1.80 - 7.70 K/uL Final    Lymphocytes, Absolute 06/20/2024 2.41  1.00 - 4.80 K/uL Final    Monocytes, Absolute 06/20/2024 0.39  0.00 - 0.80 K/uL Final    Eosinophils, Absolute 06/20/2024 0.37  0.00 - 0.50 K/uL Final    Basophils, Absolute 06/20/2024 0.06  0.00 - 0.20 K/uL Final    Immature Granulocytes, Absolute 06/20/2024 0.02  0.00 - 0.04 K/uL Final    nRBC, Absolute 06/20/2024 0.00  <=0.00 x10e3/uL Final    Diff Type 06/20/2024 Auto   Final     Mammo Digital Screening Bilat w/ Prieto  Narrative: Facility:  [address] [address] [phone]    Name: Saray Jorgensen    MRN: [id number]    Result:  Mammo Digital Screening Bilat w/ Prieto    History:  Patient is 69 y.o. and is seen for a screening mammogram.    Films Compared:  Compared to: 11/08/2023 Mammo Digital Screening Bilat w/ Prieto, 11/02/2022   Mammo Digital Screening  Bilat, and 10/27/2021 Mammo Digital Screening   Bilat     Findings:  This procedure was performed using tomosynthesis.   Computer-aided detection was utilized in the interpretation of this   examination.    There are scattered areas of fibroglandular density. There is no evidence   of suspicious masses, microcalcifications or architectural distortion.  Impression:    No mammographic evidence of malignancy.    BI-RADS Category 1: Negative    Recommendation:  Routine screening mammogram in 1 year is recommended.    Your estimated lifetime risk of breast cancer (to age 85) based on   Tyrer-Cuzick risk assessment model is 4.39%.  According to the American   Cancer Society, patients with a lifetime breast cancer risk of 20% or   higher might benefit from supplemental screening tests, such as screening   breast MRI.    Nyla Aguayo MD      Assessment and Plan (including Health Maintenance)      Problem List  Smart Sets  Document Outside HM   :    Health Maintenance Due   Topic Date Due    COVID-19 Vaccine (1) Never done    TETANUS VACCINE  Never done    Shingles Vaccine (1 of 2) Never done    Low Dose Statin  Never done    RSV Vaccine (Age 60+ and Pregnant patients) (1 - Risk 60-74 years 1-dose series) Never done    Hemoglobin A1c  12/20/2024    Eye Exam  03/18/2025       Problem List Items Addressed This Visit          ID    Cellulitis of left lower extremity - Primary    Current Assessment & Plan     Redness, edema, swelling to left lower ext. Discussed treatment options/risks/benefits; patient voices understanding. Rocpehin IM today. Avoid sodium and added salt. Elevate lower ext. Start keflex po TID. Kenalog for vasculitic rash.   Unable to get x-ray of lower ext today in clinic.          Relevant Medications    cefTRIAXone injection 1 g (Completed)    cephALEXin (KEFLEX) 500 MG capsule     Other Visit Diagnoses       Pruritic rash        Relevant Medications    triamcinolone acetonide 0.1% (KENALOG) 0.1 % cream             Health Maintenance Topics with due status: Not Due       Topic Last Completion Date    Colorectal Cancer Screening 12/18/2015    DEXA Scan 04/21/2022    Diabetes Urine Screening 06/20/2024    Foot Exam 06/20/2024    Lipid Panel 06/20/2024    Mammogram 11/13/2024       Future Appointments   Date Time Provider Department Center   12/30/2024  9:00 AM Kailee Garcia FNP [facility]   11/19/2025  1:00 PM [facility]          Signature:  DELFINA Clark [facility] [address] [place] [address]  [phone]    Date of encounter: 12/23/24

## 2024-12-31 DIAGNOSIS — E53.8 VITAMIN B 12 DEFICIENCY: ICD-10-CM

## 2024-12-31 DIAGNOSIS — E11.9 TYPE 2 DIABETES MELLITUS WITHOUT COMPLICATION, WITHOUT LONG-TERM CURRENT USE OF INSULIN: ICD-10-CM

## 2024-12-31 DIAGNOSIS — F33.42 RECURRENT MAJOR DEPRESSIVE DISORDER, IN FULL REMISSION: ICD-10-CM

## 2024-12-31 DIAGNOSIS — I10 ESSENTIAL HYPERTENSION: ICD-10-CM

## 2024-12-31 DIAGNOSIS — M16.11 ARTHRITIS OF RIGHT HIP: ICD-10-CM

## 2024-12-31 DIAGNOSIS — M17.0 OSTEOARTHRITIS OF BOTH KNEES, UNSPECIFIED OSTEOARTHRITIS TYPE: ICD-10-CM

## 2024-12-31 DIAGNOSIS — E78.5 HYPERLIPIDEMIA, UNSPECIFIED HYPERLIPIDEMIA TYPE: ICD-10-CM

## 2024-12-31 DIAGNOSIS — J30.1 SEASONAL ALLERGIC RHINITIS DUE TO POLLEN: ICD-10-CM

## 2024-12-31 RX ORDER — CETIRIZINE HYDROCHLORIDE 10 MG/1
10 TABLET ORAL DAILY
Qty: 30 TABLET | Refills: 0 | Status: SHIPPED | OUTPATIENT
Start: 2024-12-31

## 2024-12-31 RX ORDER — HYDROCHLOROTHIAZIDE 12.5 MG/1
12.5-25 TABLET ORAL DAILY
Qty: 60 TABLET | Refills: 0 | Status: SHIPPED | OUTPATIENT
Start: 2024-12-31 | End: 2025-06-29

## 2024-12-31 RX ORDER — SITAGLIPTIN AND METFORMIN HYDROCHLORIDE 1000; 100 MG/1; MG/1
1 TABLET, FILM COATED, EXTENDED RELEASE ORAL DAILY
Qty: 30 TABLET | Refills: 0 | Status: SHIPPED | OUTPATIENT
Start: 2024-12-31

## 2024-12-31 RX ORDER — LISINOPRIL 40 MG/1
40 TABLET ORAL DAILY
Qty: 30 TABLET | Refills: 0 | Status: SHIPPED | OUTPATIENT
Start: 2024-12-31 | End: 2025-12-31

## 2024-12-31 RX ORDER — FUROSEMIDE 40 MG/1
40 TABLET ORAL DAILY
Qty: 30 TABLET | Refills: 0 | Status: SHIPPED | OUTPATIENT
Start: 2024-12-31

## 2024-12-31 RX ORDER — CELECOXIB 100 MG/1
100 CAPSULE ORAL DAILY
Qty: 30 CAPSULE | Refills: 0 | Status: SHIPPED | OUTPATIENT
Start: 2024-12-31

## 2024-12-31 RX ORDER — SIMVASTATIN 40 MG/1
40 TABLET, FILM COATED ORAL NIGHTLY
Qty: 30 TABLET | Refills: 0 | Status: SHIPPED | OUTPATIENT
Start: 2024-12-31 | End: 2025-06-29

## 2024-12-31 RX ORDER — ERGOCALCIFEROL 1.25 MG/1
50000 CAPSULE ORAL
Qty: 5 CAPSULE | Refills: 0 | Status: SHIPPED | OUTPATIENT
Start: 2024-12-31 | End: 2025-12-31

## 2024-12-31 RX ORDER — CITALOPRAM 20 MG/1
20 TABLET, FILM COATED ORAL DAILY
Qty: 30 TABLET | Refills: 0 | Status: SHIPPED | OUTPATIENT
Start: 2024-12-31

## 2024-12-31 RX ORDER — GLIPIZIDE 5 MG/1
TABLET, FILM COATED, EXTENDED RELEASE ORAL
Qty: 30 TABLET | Refills: 0 | Status: SHIPPED | OUTPATIENT
Start: 2024-12-31

## 2024-12-31 NOTE — TELEPHONE ENCOUNTER
Please advise. Pt called into clinic requesting all medication refills. Last seen on 12/23/24 for cellulitis. Follow up visit scheduled. Pt running out of medications before follow up.

## 2025-01-14 ENCOUNTER — OFFICE VISIT (OUTPATIENT)
Dept: FAMILY MEDICINE | Facility: CLINIC | Age: 70
End: 2025-01-14
Payer: COMMERCIAL

## 2025-01-14 VITALS
OXYGEN SATURATION: 97 % | DIASTOLIC BLOOD PRESSURE: 78 MMHG | HEIGHT: 70 IN | HEART RATE: 97 BPM | SYSTOLIC BLOOD PRESSURE: 130 MMHG | WEIGHT: 291.38 LBS | RESPIRATION RATE: 18 BRPM | BODY MASS INDEX: 41.72 KG/M2 | TEMPERATURE: 97 F

## 2025-01-14 DIAGNOSIS — E11.9 TYPE 2 DIABETES MELLITUS WITHOUT COMPLICATION, WITHOUT LONG-TERM CURRENT USE OF INSULIN: ICD-10-CM

## 2025-01-14 DIAGNOSIS — E55.9 VITAMIN D DEFICIENCY: ICD-10-CM

## 2025-01-14 DIAGNOSIS — J30.1 SEASONAL ALLERGIC RHINITIS DUE TO POLLEN: ICD-10-CM

## 2025-01-14 DIAGNOSIS — M17.0 OSTEOARTHRITIS OF BOTH KNEES, UNSPECIFIED OSTEOARTHRITIS TYPE: ICD-10-CM

## 2025-01-14 DIAGNOSIS — E78.5 HYPERLIPIDEMIA, UNSPECIFIED HYPERLIPIDEMIA TYPE: Primary | ICD-10-CM

## 2025-01-14 DIAGNOSIS — M16.11 ARTHRITIS OF RIGHT HIP: ICD-10-CM

## 2025-01-14 DIAGNOSIS — L28.2 PRURITIC RASH: ICD-10-CM

## 2025-01-14 DIAGNOSIS — E53.8 VITAMIN B 12 DEFICIENCY: ICD-10-CM

## 2025-01-14 DIAGNOSIS — I10 ESSENTIAL HYPERTENSION: ICD-10-CM

## 2025-01-14 DIAGNOSIS — F33.42 RECURRENT MAJOR DEPRESSIVE DISORDER, IN FULL REMISSION: ICD-10-CM

## 2025-01-14 LAB
25(OH)D3 SERPL-MCNC: 49.1 NG/ML (ref 30–80)
ALBUMIN SERPL BCP-MCNC: 3.5 G/DL (ref 3.4–4.8)
ALBUMIN/GLOB SERPL: 0.7 {RATIO}
ALP SERPL-CCNC: 66 U/L (ref 40–150)
ALT SERPL W P-5'-P-CCNC: 10 U/L
ANION GAP SERPL CALCULATED.3IONS-SCNC: 13 MMOL/L (ref 7–16)
AST SERPL W P-5'-P-CCNC: 33 U/L (ref 5–34)
BASOPHILS # BLD AUTO: 0.06 K/UL (ref 0–0.2)
BASOPHILS NFR BLD AUTO: 0.8 % (ref 0–1)
BILIRUB SERPL-MCNC: 0.5 MG/DL
BUN SERPL-MCNC: 18 MG/DL (ref 10–20)
BUN/CREAT SERPL: 18 (ref 6–20)
CALCIUM SERPL-MCNC: 10.3 MG/DL (ref 8.4–10.2)
CHLORIDE SERPL-SCNC: 103 MMOL/L (ref 98–107)
CHOLEST SERPL-MCNC: 210 MG/DL
CHOLEST/HDLC SERPL: 4 {RATIO}
CO2 SERPL-SCNC: 26 MMOL/L (ref 23–31)
CREAT SERPL-MCNC: 0.99 MG/DL (ref 0.55–1.02)
DIFFERENTIAL METHOD BLD: ABNORMAL
EGFR (NO RACE VARIABLE) (RUSH/TITUS): 62 ML/MIN/1.73M2
EOSINOPHIL # BLD AUTO: 0.53 K/UL (ref 0–0.5)
EOSINOPHIL NFR BLD AUTO: 7.5 % (ref 1–4)
ERYTHROCYTE [DISTWIDTH] IN BLOOD BY AUTOMATED COUNT: 13.1 % (ref 11.5–14.5)
EST. AVERAGE GLUCOSE BLD GHB EST-MCNC: 163 MG/DL
FOLATE SERPL-MCNC: 18.7 NG/ML (ref 7–31.4)
GLOBULIN SER-MCNC: 5.1 G/DL (ref 2–4)
GLUCOSE SERPL-MCNC: 235 MG/DL (ref 82–115)
HBA1C MFR BLD HPLC: 7.3 %
HCT VFR BLD AUTO: 38.1 % (ref 38–47)
HDLC SERPL-MCNC: 53 MG/DL (ref 35–60)
HGB BLD-MCNC: 11.4 G/DL (ref 12–16)
IMM GRANULOCYTES # BLD AUTO: 0.02 K/UL (ref 0–0.04)
IMM GRANULOCYTES NFR BLD: 0.3 % (ref 0–0.4)
LDLC SERPL CALC-MCNC: 132 MG/DL
LDLC/HDLC SERPL: 2.5 {RATIO}
LYMPHOCYTES # BLD AUTO: 2.35 K/UL (ref 1–4.8)
LYMPHOCYTES NFR BLD AUTO: 33.3 % (ref 27–41)
MCH RBC QN AUTO: 29.3 PG (ref 27–31)
MCHC RBC AUTO-ENTMCNC: 29.9 G/DL (ref 32–36)
MCV RBC AUTO: 97.9 FL (ref 80–96)
MONOCYTES # BLD AUTO: 0.41 K/UL (ref 0–0.8)
MONOCYTES NFR BLD AUTO: 5.8 % (ref 2–6)
MPC BLD CALC-MCNC: 11.3 FL (ref 9.4–12.4)
NEUTROPHILS # BLD AUTO: 3.69 K/UL (ref 1.8–7.7)
NEUTROPHILS NFR BLD AUTO: 52.3 % (ref 53–65)
NONHDLC SERPL-MCNC: 157 MG/DL
NRBC # BLD AUTO: 0 X10E3/UL
NRBC, AUTO (.00): 0 %
PLATELET # BLD AUTO: 286 K/UL (ref 150–400)
POTASSIUM SERPL-SCNC: 4.7 MMOL/L (ref 3.5–5.1)
PROT SERPL-MCNC: 8.6 G/DL (ref 5.8–7.6)
RBC # BLD AUTO: 3.89 M/UL (ref 4.2–5.4)
SODIUM SERPL-SCNC: 137 MMOL/L (ref 136–145)
TRIGL SERPL-MCNC: 127 MG/DL (ref 37–140)
VIT B12 SERPL-MCNC: 990 PG/ML (ref 213–816)
VLDLC SERPL-MCNC: 25 MG/DL
WBC # BLD AUTO: 7.06 K/UL (ref 4.5–11)

## 2025-01-14 PROCEDURE — 80053 COMPREHEN METABOLIC PANEL: CPT | Mod: ,,, | Performed by: CLINICAL MEDICAL LABORATORY

## 2025-01-14 PROCEDURE — 82607 VITAMIN B-12: CPT | Mod: ,,, | Performed by: CLINICAL MEDICAL LABORATORY

## 2025-01-14 PROCEDURE — 80061 LIPID PANEL: CPT | Mod: ,,, | Performed by: CLINICAL MEDICAL LABORATORY

## 2025-01-14 PROCEDURE — 83036 HEMOGLOBIN GLYCOSYLATED A1C: CPT | Mod: ,,, | Performed by: CLINICAL MEDICAL LABORATORY

## 2025-01-14 PROCEDURE — 82306 VITAMIN D 25 HYDROXY: CPT | Mod: ,,, | Performed by: CLINICAL MEDICAL LABORATORY

## 2025-01-14 PROCEDURE — 82746 ASSAY OF FOLIC ACID SERUM: CPT | Mod: ,,, | Performed by: CLINICAL MEDICAL LABORATORY

## 2025-01-14 PROCEDURE — 99214 OFFICE O/P EST MOD 30 MIN: CPT | Mod: ,,, | Performed by: NURSE PRACTITIONER

## 2025-01-14 PROCEDURE — 85025 COMPLETE CBC W/AUTO DIFF WBC: CPT | Mod: ,,, | Performed by: CLINICAL MEDICAL LABORATORY

## 2025-01-14 RX ORDER — SIMVASTATIN 40 MG/1
40 TABLET, FILM COATED ORAL NIGHTLY
Qty: 90 TABLET | Refills: 1 | Status: SHIPPED | OUTPATIENT
Start: 2025-01-14 | End: 2025-01-15 | Stop reason: ALTCHOICE

## 2025-01-14 RX ORDER — LISINOPRIL 40 MG/1
40 TABLET ORAL DAILY
Qty: 90 TABLET | Refills: 1 | Status: SHIPPED | OUTPATIENT
Start: 2025-01-14 | End: 2026-01-14

## 2025-01-14 RX ORDER — CETIRIZINE HYDROCHLORIDE 10 MG/1
10 TABLET ORAL DAILY
Qty: 90 TABLET | Refills: 1 | Status: SHIPPED | OUTPATIENT
Start: 2025-01-14

## 2025-01-14 RX ORDER — HYDROCHLOROTHIAZIDE 12.5 MG/1
12.5-25 TABLET ORAL DAILY
Qty: 180 TABLET | Refills: 1 | Status: SHIPPED | OUTPATIENT
Start: 2025-01-14 | End: 2025-07-13

## 2025-01-14 RX ORDER — TRIAMCINOLONE ACETONIDE 1 MG/G
CREAM TOPICAL 2 TIMES DAILY
Qty: 80 G | Refills: 1 | Status: SHIPPED | OUTPATIENT
Start: 2025-01-14

## 2025-01-14 RX ORDER — SITAGLIPTIN AND METFORMIN HYDROCHLORIDE 1000; 100 MG/1; MG/1
1 TABLET, FILM COATED, EXTENDED RELEASE ORAL DAILY
Qty: 90 TABLET | Refills: 1 | Status: SHIPPED | OUTPATIENT
Start: 2025-01-14

## 2025-01-14 RX ORDER — FUROSEMIDE 40 MG/1
40 TABLET ORAL DAILY
Qty: 90 TABLET | Refills: 1 | Status: SHIPPED | OUTPATIENT
Start: 2025-01-14

## 2025-01-14 RX ORDER — CITALOPRAM 20 MG/1
20 TABLET, FILM COATED ORAL DAILY
Qty: 90 TABLET | Refills: 1 | Status: SHIPPED | OUTPATIENT
Start: 2025-01-14

## 2025-01-14 RX ORDER — CELECOXIB 100 MG/1
100 CAPSULE ORAL DAILY
Qty: 90 CAPSULE | Refills: 1 | Status: SHIPPED | OUTPATIENT
Start: 2025-01-14

## 2025-01-14 RX ORDER — GLIPIZIDE 5 MG/1
TABLET, FILM COATED, EXTENDED RELEASE ORAL
Qty: 90 TABLET | Refills: 1 | Status: SHIPPED | OUTPATIENT
Start: 2025-01-14 | End: 2025-01-15

## 2025-01-14 RX ORDER — ERGOCALCIFEROL 1.25 MG/1
50000 CAPSULE ORAL
Qty: 13 CAPSULE | Refills: 1 | Status: SHIPPED | OUTPATIENT
Start: 2025-01-14 | End: 2026-01-14

## 2025-01-14 NOTE — PROGRESS NOTES
DELFINA Clark   [facility] [address] [place] [address]  [phone]      PATIENT NAME: Saray Jorgensen  : 1955  DATE: 25  MRN: [id number]      Billing Provider: DELFINA Clark  Level of Service:   Patient PCP Information       Provider PCP Type    DELFINA Clark General                Medications and Allergies     Medications  Outpatient Medications Marked as Taking for the 25 encounter (Office Visit) with Kailee Garcia FNP   Medication Sig Dispense Refill    ascorbic acid-multivit-min (VITAMIN C ENERGY BOOSTER) 1,000 mg PwEP Take 1 packet by mouth once daily.      aspirin 325 MG tablet Take 1 tablet (325 mg total) by mouth once daily. 90 tablet 2    aspirin 500 MG EC tablet Take 500 mg by mouth once daily.      diclofenac sodium (VOLTAREN) 1 % Gel Apply 2 g topically 2 (two) times daily. 100 g 2    fluticasone propionate (FLONASE) 50 mcg/actuation nasal spray 1 spray by Each Nostril route once daily.      GARLIC ORAL Take 1 tablet by mouth once daily.      multivitamin (THERAGRAN) per tablet Take 1 tablet by mouth once daily.      olopatadine (PATADAY) 0.2 % Drop       [DISCONTINUED] celecoxib (CELEBREX) 100 MG capsule Take 1 capsule (100 mg total) by mouth once daily. 30 capsule 0    [DISCONTINUED] cetirizine (ZYRTEC) 10 MG tablet Take 1 tablet (10 mg total) by mouth once daily. 30 tablet 0    [DISCONTINUED] citalopram (CELEXA) 20 MG tablet Take 1 tablet (20 mg total) by mouth once daily. 30 tablet 0    [DISCONTINUED] ergocalciferol (ERGOCALCIFEROL) 50,000 unit Cap Take 1 capsule (50,000 Units total) by mouth every 7 days. 5 capsule 0    [DISCONTINUED] furosemide (LASIX) 40 MG tablet Take 1 tablet (40 mg total) by mouth once daily. 30 tablet 0    [DISCONTINUED] glipiZIDE 5 MG TR24 TAKE ONE TABLET BY MOUTH ONCE DAILY WITH BREAKFAST 30 tablet 0    [DISCONTINUED] hydroCHLOROthiazide (HYDRODIURIL) 12.5 MG Tab Take 1-2  tablets (12.5-25 mg total) by mouth once daily. 60 tablet 0    [DISCONTINUED] JANUMET -1,000 mg TM24 Take 1 tablet by mouth once daily. 30 tablet 0    [DISCONTINUED] lisinopriL (PRINIVIL,ZESTRIL) 40 MG tablet Take 1 tablet (40 mg total) by mouth once daily. 30 tablet 0    [DISCONTINUED] simvastatin (ZOCOR) 40 MG tablet Take 1 tablet (40 mg total) by mouth every evening. 30 tablet 0    [DISCONTINUED] triamcinolone acetonide 0.1% (KENALOG) 0.1 % cream Apply topically 2 (two) times daily. 80 g 1       Allergies  Review of patient's allergies indicates:  No Known Allergies    History of Present Illness    CHIEF COMPLAINT:  Patient presents today for follow-up on leg swelling and routine checkup.    LEG SWELLING AND SKIN CONCERNS:  She reports improvement in leg swelling, noting that her leg looks and feels better. She experiences itching in the affected area and has been applying a mixture of Vaseline and prescribed cream to address skin dryness. She reports difficulty wearing shoes due to previous swelling. She acknowledges the importance of taking diuretics for fluid retention management, despite family member concerns, and understands the need for regular compliance to prevent fluid buildup.    WEIGHT MANAGEMENT:  She reports a 7-pound weight loss since last visit.    FLUID AND DIET:  She drinks 2-4 bottles of sugar-free Powerade daily in addition to regular water intake.    BLOOD SUGAR:  Home blood sugar readings range from 94 to 115 mg/dL.    GI AND :  She denies any GI issues including nausea, diarrhea, and constipation. She reports normal bladder and bowel function.    ALLERGIES:  She denies any known allergies.      ROS:  General: -fever, -chills, -fatigue, -weight gain, +weight loss  Eyes: -vision changes, -redness, -discharge  ENT: -ear pain, -nasal congestion, -sore throat  Cardiovascular: -chest pain, -palpitations, -lower extremity edema  Respiratory: -cough, -shortness of breath  Gastrointestinal:  -abdominal pain, -nausea, -vomiting, -diarrhea, -constipation, -blood in stool  Genitourinary: -dysuria, -hematuria, -frequency  Musculoskeletal: -joint pain, -muscle pain  Skin: -rash, -lesion, +dry skin  Neurological: -headache, -dizziness, -numbness, -tingling  Psychiatric: -anxiety, -depression, -sleep difficulty  Allergic: -allergic reactions          Physical Exam    Vitals: Weight: 291 lbs.  General: No acute distress. Well-developed. Well-nourished.  Eyes: EOMI. Sclerae anicteric.  HENT: Normocephalic. Atraumatic.  Cardiovascular: Regular rate. Regular rhythm.  Respiratory: Normal respiratory effort. Clear to auscultation bilaterally.   Abdomen: Soft. Non-tender. Non-distended. Normoactive bowel sounds.  Musculoskeletal: No  obvious deformity.  Extremities: No lower extremity edema. Redness resolved in right extremity. Swelling decreased in right extremity.  Neurological: Alert & oriented x3. No slurred speech. Normal gait.  Psychiatric: Normal mood. Normal affect. Good insight. Good judgment.  Skin: Warm. Dry. No rash.          Assessment & Plan    IMPRESSION:  - Assessed leg swelling, noting significant improvement  - Evaluated skin condition, considering low-dose steroid cream for persistent itching  - Reviewed labs results, confirming good kidney function  - Monitored weight loss, noting 7-pound reduction since December  - Assessed vitamin D levels    FLUID RETENTION:  - Discussed fluid retention and its relationship to kidney function with the patient.  - Advised the patient to continue elevating leg when sitting.  - Instructed the patient to inspect feet nightly for any signs of swelling.  - Continued diuretics at current dosage.  - Noted improvement in the patient's leg swelling.    PRURITUS:  - Prescribed low-dose topical corticosteroid cream for leg pruritus.  - Noted the patient's report of itching in the leg.  - Acknowledged the presence of pruritus and dry skin.  - Advised the patient she may  continue using Vaseline mixed with the prescribed cream.      FOOT CARE:  - Emphasized the importance of proper foot care and observation, especially when wearing shoes.  - Instructed the patient to inspect feet nightly for any signs of irritation.  - Advised the patient to monitor feet for any issues.    DIABETES:  - Noted the patient's home glucose readings between 94 and 115 mg/dL.  - Acknowledged glucose readings as satisfactory.    SKIN CONDITION:  - Noted improvement in the patient's skin condition.  - Confirmed improvement in skin condition upon exam.  - Prescribed topical corticosteroid cream for skin condition.    HYDRATION:  - Explained importance of adequate hydration despite fluid retention concerns.  - Recommend maintaining adequate hydration with water and sugar-free Powerade.  - Advised the patient to drink sugar-free electrolyte beverages.    LABS:  - Planned to check labs today.  - Blood work ordered, including vitamin D level check.    FOLLOW UP:  - Follow up in 6 months if lab results are normal.          Health Maintenance Due   Topic Date Due    COVID-19 Vaccine (1) Never done    TETANUS VACCINE  Never done    Shingles Vaccine (1 of 2) Never done    RSV Vaccine (Age 60+ and Pregnant patients) (1 - Risk 60-74 years 1-dose series) Never done    Hemoglobin A1c  12/20/2024    Diabetic Eye Exam  03/18/2025       Problem List Items Addressed This Visit          Psychiatric    Recurrent major depressive disorder, in full remission    Relevant Medications    citalopram (CELEXA) 20 MG tablet       ENT    Seasonal allergic rhinitis due to pollen    Relevant Medications    cetirizine (ZYRTEC) 10 MG tablet       Cardiac/Vascular    Essential hypertension    Relevant Medications    furosemide (LASIX) 40 MG tablet    hydroCHLOROthiazide (HYDRODIURIL) 12.5 MG Tab    lisinopriL (PRINIVIL,ZESTRIL) 40 MG tablet    Other Relevant Orders    CBC Auto Differential    Comprehensive Metabolic Panel    Hyperlipidemia -  Primary    Relevant Medications    simvastatin (ZOCOR) 40 MG tablet    Other Relevant Orders    Lipid Panel       Endocrine    Type 2 diabetes mellitus without complication, without long-term current use of insulin    Relevant Medications    glipiZIDE 5 MG TR24    JANUMET -1,000 mg TM24    Other Relevant Orders    Hemoglobin A1C    Vitamin B 12 deficiency    Relevant Medications    ergocalciferol (ERGOCALCIFEROL) 50,000 unit Cap    Other Relevant Orders    Vitamin B12 & Folate       Orthopedic    Arthritis of right hip    Relevant Medications    celecoxib (CELEBREX) 100 MG capsule    Osteoarthritis of both knees    Relevant Medications    celecoxib (CELEBREX) 100 MG capsule     Other Visit Diagnoses       Vitamin D deficiency        Relevant Orders    Vitamin D    Pruritic rash        Relevant Medications    triamcinolone acetonide 0.1% (KENALOG) 0.1 % cream            Health Maintenance Topics with due status: Not Due       Topic Last Completion Date    Colorectal Cancer Screening 12/18/2015    DEXA Scan 04/21/2022    Diabetes Urine Screening 06/20/2024    Foot Exam 06/20/2024    Lipid Panel 06/20/2024    Mammogram 11/13/2024    Low Dose Statin 01/14/2025       Future Appointments   Date Time Provider Department Center   2/27/2025 10:00 AM [facility]UR[facility], [facility]   7/14/2025 10:40 AM Kailee Garcia FNP [facility]   11/19/2025  1:00 PM [facility]        This note was generated with the assistance of ambient listening technology. Verbal consent was obtained by the patient and accompanying visitor(s) for the recording of patient appointment to facilitate this note. I attest to having reviewed and edited the generated note for accuracy, though some syntax or spelling errors may persist. Please contact the author of this note for any clarification.     Signature:  DELFINA Clark  [facility] -  [address] [place] [address]  [phone]    Date of encounter: 1/14/25

## 2025-01-15 DIAGNOSIS — E78.5 HYPERLIPIDEMIA, UNSPECIFIED HYPERLIPIDEMIA TYPE: Primary | ICD-10-CM

## 2025-01-15 DIAGNOSIS — E11.9 TYPE 2 DIABETES MELLITUS WITHOUT COMPLICATION, WITHOUT LONG-TERM CURRENT USE OF INSULIN: ICD-10-CM

## 2025-01-15 DIAGNOSIS — E78.5 HYPERLIPIDEMIA, UNSPECIFIED HYPERLIPIDEMIA TYPE: ICD-10-CM

## 2025-01-15 RX ORDER — ATORVASTATIN CALCIUM 80 MG/1
80 TABLET, FILM COATED ORAL DAILY
Qty: 90 TABLET | Refills: 1 | Status: SHIPPED | OUTPATIENT
Start: 2025-01-15 | End: 2025-01-15 | Stop reason: SDUPTHER

## 2025-01-15 RX ORDER — ATORVASTATIN CALCIUM 80 MG/1
80 TABLET, FILM COATED ORAL DAILY
Qty: 90 TABLET | Refills: 1 | Status: SHIPPED | OUTPATIENT
Start: 2025-01-15 | End: 2026-01-15

## 2025-01-15 RX ORDER — GLIPIZIDE 5 MG/1
TABLET, FILM COATED, EXTENDED RELEASE ORAL
Qty: 90 TABLET | Refills: 1 | Status: SHIPPED | OUTPATIENT
Start: 2025-01-15

## 2025-01-15 RX ORDER — GLIPIZIDE 5 MG/1
TABLET, FILM COATED, EXTENDED RELEASE ORAL
Qty: 90 TABLET | Refills: 1 | Status: SHIPPED | OUTPATIENT
Start: 2025-01-15 | End: 2025-01-15 | Stop reason: SDUPTHER

## 2025-01-31 ENCOUNTER — OFFICE VISIT (OUTPATIENT)
Dept: FAMILY MEDICINE | Facility: CLINIC | Age: 70
End: 2025-01-31
Payer: COMMERCIAL

## 2025-01-31 VITALS
SYSTOLIC BLOOD PRESSURE: 130 MMHG | DIASTOLIC BLOOD PRESSURE: 80 MMHG | RESPIRATION RATE: 18 BRPM | TEMPERATURE: 98 F | WEIGHT: 293 LBS | HEIGHT: 70 IN | BODY MASS INDEX: 41.95 KG/M2 | HEART RATE: 100 BPM | OXYGEN SATURATION: 97 %

## 2025-01-31 DIAGNOSIS — R09.81 NASAL CONGESTION: ICD-10-CM

## 2025-01-31 DIAGNOSIS — R05.9 COUGH, UNSPECIFIED TYPE: ICD-10-CM

## 2025-01-31 DIAGNOSIS — U07.1 COVID-19: Primary | ICD-10-CM

## 2025-01-31 DIAGNOSIS — J02.9 SORE THROAT: ICD-10-CM

## 2025-01-31 LAB
CTP QC/QA: YES
MOLECULAR STREP A: NEGATIVE
POC MOLECULAR INFLUENZA A AGN: NEGATIVE
POC MOLECULAR INFLUENZA B AGN: NEGATIVE
POC RSV RAPID ANT MOLECULAR: NEGATIVE
SARS-COV-2 RDRP RESP QL NAA+PROBE: POSITIVE

## 2025-01-31 PROCEDURE — 87635 SARS-COV-2 COVID-19 AMP PRB: CPT | Mod: RHCUB

## 2025-01-31 PROCEDURE — 99213 OFFICE O/P EST LOW 20 MIN: CPT | Mod: ,,,

## 2025-01-31 PROCEDURE — 87634 RSV DNA/RNA AMP PROBE: CPT | Mod: RHCUB

## 2025-01-31 PROCEDURE — 87502 INFLUENZA DNA AMP PROBE: CPT | Mod: RHCUB

## 2025-01-31 PROCEDURE — 87651 STREP A DNA AMP PROBE: CPT | Mod: RHCUB

## 2025-01-31 NOTE — ASSESSMENT & PLAN NOTE
COVID POSTIVE  Increase fluid intake  Start mucinex daily and drink a lot of water with it  Follow up with the clinic if s/s persist or become worse.

## 2025-01-31 NOTE — PROGRESS NOTES
HPI:   Saray Jorgensen is a pleasant 69 y.o. patient who reports to clinic with complaints of coughing and feeling bad for 2 days. She states her cough is productive clear or whitish sputum. She states she has had nasal congestion, sore throat. She also says her eyes have been watery and she has been sneezing often. She denies any fever/chills. She denies any shortness of breath or chest pain. She denies any other issues.                       Past Medical History:   Diagnosis Date    Colon cancer     Diabetes     Diabetes mellitus, type 2     Gallstones     History of colonoscopy 11/03/2014    Hyperlipidemia     Hypertension        PAST SURGICAL HISTORY:   Past Surgical History:   Procedure Laterality Date    COLECTOMY, RIGHT      CYSTOSCOPY N/A 05/31/2023    Procedure: CYSTOSCOPY;  Surgeon: Niyah King MD;  Location: [facility];  Service: OB/GYN;  Laterality: N/A;    HYSTERECTOMY      HYSTERECTOMY, TOTAL, LAPAROSCOPIC, WITH SALPINGO-OOPHORECTOMY Bilateral 05/31/2023    Procedure: HYSTERECTOMY,TOTAL,LAPAROSCOPIC,WITH SALPINGO-OOPHORECTOMY;  Surgeon: Niyah King MD;  Location: [facility] OR;  Service: OB/GYN;  Laterality: Bilateral;       MEDICATIONS:    Current Outpatient Medications:     ascorbic acid-multivit-min (VITAMIN C ENERGY BOOSTER) 1,000 mg PwEP, Take 1 packet by mouth once daily., Disp: , Rfl:     ascorbic acid-multivit-min (VITAMIN C FIZZY DRINK) 1,000 mg PwEP, Take 1 packet by mouth once daily., Disp: , Rfl:     aspirin 325 MG tablet, Take 1 tablet (325 mg total) by mouth once daily., Disp: 90 tablet, Rfl: 2    aspirin 500 MG EC tablet, Take 500 mg by mouth once daily., Disp: , Rfl:     atorvastatin (LIPITOR) 80 MG tablet, Take 1 tablet (80 mg total) by mouth once daily. for cholesterol, Disp: 90 tablet, Rfl: 1    celecoxib (CELEBREX) 100 MG capsule, Take 1 capsule (100 mg total) by mouth once daily., Disp: 90 capsule, Rfl: 1    cetirizine (ZYRTEC) 10 MG tablet, Take 1 tablet (10 mg  total) by mouth once daily., Disp: 90 tablet, Rfl: 1    citalopram (CELEXA) 20 MG tablet, Take 1 tablet (20 mg total) by mouth once daily., Disp: 90 tablet, Rfl: 1    diclofenac sodium (VOLTAREN) 1 % Gel, Apply 2 g topically 2 (two) times daily., Disp: 100 g, Rfl: 2    ergocalciferol (ERGOCALCIFEROL) 50,000 unit Cap, Take 1 capsule (50,000 Units total) by mouth every 7 days., Disp: 13 capsule, Rfl: 1    fluticasone propionate (FLONASE) 50 mcg/actuation nasal spray, 1 spray by Each Nostril route once daily., Disp: , Rfl:     furosemide (LASIX) 40 MG tablet, Take 1 tablet (40 mg total) by mouth once daily., Disp: 90 tablet, Rfl: 1    GARLIC ORAL, Take 1 tablet by mouth once daily., Disp: , Rfl:     glipiZIDE 5 MG TR24, TAKE ONE TABLET BY MOUTH ONCE DAILY WITH BREAKFAST, Disp: 90 tablet, Rfl: 1    hydroCHLOROthiazide (HYDRODIURIL) 12.5 MG Tab, Take 1-2 tablets (12.5-25 mg total) by mouth once daily., Disp: 180 tablet, Rfl: 1    JANUMET -1,000 mg TM24, Take 1 tablet by mouth once daily., Disp: 90 tablet, Rfl: 1    lisinopriL (PRINIVIL,ZESTRIL) 40 MG tablet, Take 1 tablet (40 mg total) by mouth once daily., Disp: 90 tablet, Rfl: 1    multivitamin (THERAGRAN) per tablet, Take 1 tablet by mouth once daily., Disp: , Rfl:     olopatadine (PATADAY) 0.2 % Drop, , Disp: , Rfl:     triamcinolone acetonide 0.1% (KENALOG) 0.1 % cream, Apply topically 2 (two) times daily., Disp: 80 g, Rfl: 1    ALLERGIES:   Review of patient's allergies indicates:  No Known Allergies      Review of Systems   Constitutional: Negative.  Negative for activity change, chills and fever.   HENT:  Positive for nasal congestion, sneezing and sore throat. Negative for drooling and nosebleeds.    Eyes: Negative.    Respiratory:  Positive for cough. Negative for chest tightness.    Cardiovascular: Negative.  Negative for chest pain and palpitations.   Gastrointestinal: Negative.    Endocrine: Negative.    Genitourinary: Negative.  Negative for  "difficulty urinating.   Musculoskeletal: Negative.    Integumentary:  Negative.   Allergic/Immunologic: Negative.    Neurological: Negative.  Negative for dizziness.   Hematological: Negative.    Psychiatric/Behavioral: Negative.     All other systems reviewed and are negative.         Physical Exam  Constitutional:       General: She is not in acute distress.     Appearance: Normal appearance. She is well-developed. She is obese. She is ill-appearing.   HENT:      Head: Normocephalic and atraumatic.      Right Ear: Tympanic membrane normal.      Left Ear: Tympanic membrane normal.      Nose: Nose normal.      Mouth/Throat:      Mouth: Mucous membranes are moist.      Pharynx: Oropharynx is clear. No posterior oropharyngeal erythema.   Cardiovascular:      Rate and Rhythm: Normal rate and regular rhythm.      Pulses: Normal pulses.      Heart sounds: Normal heart sounds.   Pulmonary:      Effort: Pulmonary effort is normal. No accessory muscle usage or respiratory distress.      Breath sounds: Normal breath sounds.   Abdominal:      General: Abdomen is flat. Bowel sounds are normal. There is no distension.      Palpations: Abdomen is soft.      Tenderness: There is no abdominal tenderness.   Musculoskeletal:         General: Normal range of motion.      Cervical back: Normal range of motion.   Skin:     General: Skin is warm and dry.      Capillary Refill: Capillary refill takes less than 2 seconds.   Neurological:      Mental Status: She is alert and oriented to person, place, and time. Mental status is at baseline.   Psychiatric:         Mood and Affect: Mood normal.         Speech: Speech normal.         Behavior: Behavior normal. Behavior is cooperative.         Thought Content: Thought content normal.          VITAL SIGNS:   /80 (BP Location: Right arm, Patient Position: Sitting)   Pulse 100   Temp 97.5 °F (36.4 °C) (Temporal)   Resp 18   Ht 5' 10" (1.778 m)   Wt 134.6 kg (296 lb 12.8 oz)   SpO2 97% "   BMI 42.59 kg/m²       ASSESSMENT/PLAN  1. COVID-19  Assessment & Plan:  Patient tested positive for Covid 19 with only mild symptoms at this point.    -Advised to take Vitamin D 5000 units daily, Zinc 50 mg daily, Vitamin C 1000mg daily, Zyrtec 10 mg daily, Pepcid 20mg daily, Aspirin 81mg daily - all for 14 days.  -May treat cough with OTC anti-tussive meds, body aches or fever with Tylenol or Motrin OTC.    -Instructed based on the most recent CDC guidelines to quarantine for 5 days from date of positive and  then wear a well fitting mask for 5 additional days after leaving quarantine.      If you are still experiencing symptoms or fever 5 days after testing positive, then continue to quarantine for 5 additional days.     - Instructed that all household contacts or those you've been in very close contact with should also quarantine for 5 days followed by 5 days of wearing a well fitting mask.  If household contacts develop symptoms they should seek testing or start their quarantine over from the day that symptoms develop.      -Quarantine means stay home, wear a mask when you can not avoid being around someone else in your home, try to maintain at least 6 feet from others in your home, and ideally confine yourself to only one room and one bathroom away from other household member.    Use frequent hand washing and clean touched surfaces frequently.    Remain active while at home in quarantine - get up and walk around outside or in your home.    -RTC for re-evaluation for worsening symptoms or go to ED.        2. Cough, unspecified type  Assessment & Plan:  COVID POSTIVE  Increase fluid intake  Start mucinex daily and drink a lot of water with it  Follow up with the clinic if s/s persist or become worse.       Orders:  -     POCT COVID-19 Rapid Screening  -     POCT Influenza A/B Molecular  -     POCT respiratory syncytial virus  -     POCT Strep A, Molecular    3. Sore throat  Assessment & Plan:  Increase fluid  intake  Rotate tylenol and Ibproufen for pain or fever.  Follow up with the clinic if s/s persist or become worse  Change Tooth Brush  Gargle warm salt water  Chloraseptic spray for sore throat.       Orders:  -     POCT COVID-19 Rapid Screening  -     POCT Influenza A/B Molecular  -     POCT respiratory syncytial virus  -     POCT Strep A, Molecular    4. Nasal congestion  Assessment & Plan:  Increase fluid intake  Rotate tylenol and Ib profen as needed for fever or body aches.  Follow up as needed with the clinc       Orders:  -     POCT COVID-19 Rapid Screening  -     POCT Influenza A/B Molecular  -     POCT respiratory syncytial virus  -     POCT Strep A, Molecular             There are no Patient Instructions on file for this visit.  Orders Placed This Encounter   Procedures    POCT COVID-19 Rapid Screening    POCT Influenza A/B Molecular    POCT respiratory syncytial virus    POCT Strep A, Molecular

## 2025-02-28 ENCOUNTER — TELEPHONE (OUTPATIENT)
Dept: FAMILY MEDICINE | Facility: CLINIC | Age: 70
End: 2025-02-28
Payer: COMMERCIAL

## 2025-02-28 NOTE — TELEPHONE ENCOUNTER
----- Message from Nolvia sent at 2/28/2025  9:31 AM CST -----  Patient called in wanting to speak with a nurse didn't really specify why

## 2025-03-13 ENCOUNTER — OFFICE VISIT (OUTPATIENT)
Dept: FAMILY MEDICINE | Facility: CLINIC | Age: 70
End: 2025-03-13
Payer: COMMERCIAL

## 2025-03-13 VITALS
HEIGHT: 70 IN | TEMPERATURE: 98 F | SYSTOLIC BLOOD PRESSURE: 138 MMHG | DIASTOLIC BLOOD PRESSURE: 70 MMHG | BODY MASS INDEX: 41.95 KG/M2 | HEART RATE: 89 BPM | OXYGEN SATURATION: 98 % | WEIGHT: 293 LBS | RESPIRATION RATE: 16 BRPM

## 2025-03-13 DIAGNOSIS — E11.9 TYPE 2 DIABETES MELLITUS WITHOUT COMPLICATION, WITHOUT LONG-TERM CURRENT USE OF INSULIN: ICD-10-CM

## 2025-03-13 DIAGNOSIS — E78.5 HYPERLIPIDEMIA, UNSPECIFIED HYPERLIPIDEMIA TYPE: ICD-10-CM

## 2025-03-13 DIAGNOSIS — E53.8 VITAMIN B 12 DEFICIENCY: ICD-10-CM

## 2025-03-13 DIAGNOSIS — Z00.00 ENCOUNTER FOR SUBSEQUENT ANNUAL WELLNESS VISIT (AWV) IN MEDICARE PATIENT: Primary | ICD-10-CM

## 2025-03-13 DIAGNOSIS — M19.90 ARTHRITIS: ICD-10-CM

## 2025-03-13 DIAGNOSIS — Z13.820 ENCOUNTER FOR OSTEOPOROSIS SCREENING IN ASYMPTOMATIC POSTMENOPAUSAL PATIENT: ICD-10-CM

## 2025-03-13 DIAGNOSIS — M16.11 ARTHRITIS OF RIGHT HIP: ICD-10-CM

## 2025-03-13 DIAGNOSIS — E55.9 VITAMIN D DEFICIENCY: ICD-10-CM

## 2025-03-13 DIAGNOSIS — E66.01 MORBID OBESITY WITH BMI OF 40.0-44.9, ADULT: ICD-10-CM

## 2025-03-13 DIAGNOSIS — Z78.0 ENCOUNTER FOR OSTEOPOROSIS SCREENING IN ASYMPTOMATIC POSTMENOPAUSAL PATIENT: ICD-10-CM

## 2025-03-13 DIAGNOSIS — J30.1 SEASONAL ALLERGIC RHINITIS DUE TO POLLEN: ICD-10-CM

## 2025-03-13 DIAGNOSIS — C18.9 MALIGNANT NEOPLASM OF COLON, UNSPECIFIED PART OF COLON: ICD-10-CM

## 2025-03-13 DIAGNOSIS — I10 ESSENTIAL HYPERTENSION: ICD-10-CM

## 2025-03-13 DIAGNOSIS — M17.0 OSTEOARTHRITIS OF BOTH KNEES, UNSPECIFIED OSTEOARTHRITIS TYPE: ICD-10-CM

## 2025-03-13 DIAGNOSIS — F33.42 RECURRENT MAJOR DEPRESSIVE DISORDER, IN FULL REMISSION: ICD-10-CM

## 2025-03-13 PROCEDURE — G0439 PPPS, SUBSEQ VISIT: HCPCS | Mod: ,,, | Performed by: NURSE PRACTITIONER

## 2025-03-13 RX ORDER — GLIPIZIDE 5 MG/1
TABLET, FILM COATED, EXTENDED RELEASE ORAL
Qty: 90 TABLET | Refills: 1 | Status: SHIPPED | OUTPATIENT
Start: 2025-03-13

## 2025-03-13 RX ORDER — ERGOCALCIFEROL 1.25 MG/1
50000 CAPSULE ORAL
Qty: 13 CAPSULE | Refills: 1 | Status: SHIPPED | OUTPATIENT
Start: 2025-03-13 | End: 2026-03-13

## 2025-03-13 RX ORDER — DICLOFENAC SODIUM 10 MG/G
2 GEL TOPICAL 2 TIMES DAILY
Qty: 100 G | Refills: 2 | Status: SHIPPED | OUTPATIENT
Start: 2025-03-13

## 2025-03-13 RX ORDER — SITAGLIPTIN AND METFORMIN HYDROCHLORIDE 1000; 100 MG/1; MG/1
1 TABLET, FILM COATED, EXTENDED RELEASE ORAL DAILY
Qty: 90 TABLET | Refills: 1 | Status: SHIPPED | OUTPATIENT
Start: 2025-03-13

## 2025-03-13 RX ORDER — CETIRIZINE HYDROCHLORIDE 10 MG/1
10 TABLET ORAL DAILY
Qty: 90 TABLET | Refills: 1 | Status: SHIPPED | OUTPATIENT
Start: 2025-03-13

## 2025-03-13 RX ORDER — CELECOXIB 100 MG/1
100 CAPSULE ORAL DAILY
Qty: 90 CAPSULE | Refills: 1 | Status: SHIPPED | OUTPATIENT
Start: 2025-03-13

## 2025-03-13 RX ORDER — ATORVASTATIN CALCIUM 80 MG/1
80 TABLET, FILM COATED ORAL DAILY
Qty: 90 TABLET | Refills: 1 | Status: SHIPPED | OUTPATIENT
Start: 2025-03-13 | End: 2025-03-27 | Stop reason: SDUPTHER

## 2025-03-13 RX ORDER — CITALOPRAM 20 MG/1
20 TABLET, FILM COATED ORAL DAILY
Qty: 90 TABLET | Refills: 1 | Status: SHIPPED | OUTPATIENT
Start: 2025-03-13

## 2025-03-13 NOTE — PATIENT INSTRUCTIONS
Counseling and Referral of Other Preventative  (Italic type indicates deductible and co-insurance are waived)    Patient Name: Saray Jorgensen  Today's Date: 3/13/2025    Health Maintenance       Date Due Completion Date    COVID-19 Vaccine (1) Never done ---    TETANUS VACCINE Never done ---    Shingles Vaccine (1 of 2) Never done ---    RSV Vaccine (Age 60+ and Pregnant patients) (1 - Risk 60-74 years 1-dose series) Never done ---    Diabetic Eye Exam 03/18/2025 3/18/2024    Override on 3/2/2022: Done    DEXA Scan 04/21/2025 4/21/2022    Diabetes Urine Screening 06/20/2025 6/20/2024    Foot Exam 06/20/2025 6/20/2024    Override on 6/20/2024: Done    Hemoglobin A1c 07/14/2025 1/14/2025    Mammogram 11/13/2025 11/13/2024    Colorectal Cancer Screening 12/18/2025 12/18/2015    Lipid Panel 01/14/2026 1/14/2025    Low Dose Statin 01/31/2026 1/31/2025        No orders of the defined types were placed in this encounter.    The following information is provided to all patients.  This information is to help you find resources for any of the problems found today that may be affecting your health:                  Living healthy guide: ms.gov    Understanding Diabetes: www.diabetes.org      Eating healthy: www.cdc.gov/healthyweight      CDC home safety checklist: www.cdc.gov/steadi/patient.html      Agency on Aging: [place].gov    Alcoholics anonymous (AA): www.aa.org      Physical Activity: www.eduar.nih.gov/ri9zysp      Tobacco use: ms.gov

## 2025-03-13 NOTE — PROGRESS NOTES
1. Encounter for subsequent annual wellness visit (AWV) in Medicare patient    2. Hyperlipidemia, unspecified hyperlipidemia type  Assessment & Plan:  Lab Results   Component Value Date    CHOL 210 (H) 01/14/2025    CHOL 172 06/20/2024    CHOL 167 01/12/2024     Lab Results   Component Value Date    HDL 53 01/14/2025    HDL 66 (H) 06/20/2024    HDL 68 (H) 01/12/2024     Lab Results   Component Value Date    LDLCALC 132 01/14/2025    LDLCALC 87 06/20/2024    LDLCALC 83 01/12/2024     Lab Results   Component Value Date    TRIG 127 01/14/2025    TRIG 94 06/20/2024    TRIG 78 01/12/2024       Lab Results   Component Value Date    CHOLHDL 4.0 01/14/2025    CHOLHDL 2.6 06/20/2024    CHOLHDL 2.5 01/12/2024     Counseled on following low cholesterol diet at home.   Continue current plan     Orders:  -     Discontinue: atorvastatin (LIPITOR) 80 MG tablet; Take 1 tablet (80 mg total) by mouth once daily. for cholesterol  Dispense: 90 tablet; Refill: 1    3. Vitamin D deficiency    4. Type 2 diabetes mellitus without complication, without long-term current use of insulin  Assessment & Plan:  Hemoglobin A1C   Date Value Ref Range Status   01/14/2025 7.3 (H) <=7.0 % Final     Comment:       Normal:               <5.7%  Pre-Diabetic:       5.7% to 6.4%  Diabetic:             >6.4%  Diabetic Goal:     <7%   06/20/2024 6.2 4.5 - 6.6 % Final     Comment:       Normal:               <5.7%  Pre-Diabetic:       5.7% to 6.4%  Diabetic:             >6.4%  Diabetic Goal:     <7%   01/12/2024 6.3 4.5 - 6.6 % Final     Comment:       Normal:               <5.7%  Pre-Diabetic:       5.7% to 6.4%  Diabetic:             >6.4%  Diabetic Goal:     <7%     She has lost weight since last visit; discussed diet and exercise. Continue current plan.    Orders:  -     glipiZIDE 5 MG TR24; TAKE ONE TABLET BY MOUTH ONCE DAILY WITH BREAKFAST  Dispense: 90 tablet; Refill: 1  -     JANUMET -1,000 mg TM24; Take 1 tablet by mouth once daily.  Dispense:  90 tablet; Refill: 1    5. Essential hypertension  Assessment & Plan:  Condition is stable. Counseled on following low sodium diet at home. Continue current plan.        6. Osteoarthritis of both knees, unspecified osteoarthritis type  -     celecoxib (CELEBREX) 100 MG capsule; Take 1 capsule (100 mg total) by mouth once daily.  Dispense: 90 capsule; Refill: 1    7. Vitamin B 12 deficiency  -     ergocalciferol (ERGOCALCIFEROL) 50,000 unit Cap; Take 1 capsule (50,000 Units total) by mouth once every 7 days.  Dispense: 13 capsule; Refill: 1    8. Arthritis    9. Encounter for osteoporosis screening in asymptomatic postmenopausal patient  -     DXA Bone Density Axial Skeleton 1 or more sites; Future; Expected date: 03/13/2025    10. Morbid obesity with BMI of 40.0-44.9, adult    11. Malignant neoplasm of colon, unspecified part of colon    12. Arthritis of right hip  -     celecoxib (CELEBREX) 100 MG capsule; Take 1 capsule (100 mg total) by mouth once daily.  Dispense: 90 capsule; Refill: 1    13. Seasonal allergic rhinitis due to pollen  -     cetirizine (ZYRTEC) 10 MG tablet; Take 1 tablet (10 mg total) by mouth once daily. for allergies  Dispense: 90 tablet; Refill: 1    14. Recurrent major depressive disorder, in full remission  -     citalopram (CELEXA) 20 MG tablet; Take 1 tablet (20 mg total) by mouth once daily.  Dispense: 90 tablet; Refill: 1    Other orders  -     diclofenac sodium (VOLTAREN) 1 % Gel; Apply 2 g topically 2 (two) times daily.  Dispense: 100 g; Refill: 2        Saray Jorgensen presented for a  Medicare AWV and comprehensive Health Risk Assessment today. The following components were reviewed and updated:    Medical history  Family History  Social history  Allergies and Current Medications  Health Risk Assessment  Health Maintenance  Care Team         ** See Completed Assessments for Annual Wellness Visit within the encounter summary.**         The following assessments were completed:  Living  "Situation  CAGE  Depression Screening  Timed Get Up and Go  Whisper Test  Cognitive Function Screening  Nutrition Screening  ADL Screening  PAQ Screening        Opioid Documentation    does not have a current opioid prescription.       Vitals:    03/13/25 1336   BP: 138/70   BP Location: Left arm   Patient Position: Sitting   Pulse: 89   Resp: 16   Temp: 97.5 °F (36.4 °C)   SpO2: 98%   Weight: 133.4 kg (294 lb)   Height: 5' 10" (1.778 m)     Body mass index is 42.18 kg/m².  Physical Exam  Constitutional:       General: She is not in acute distress.  HENT:      Head: Normocephalic.      Nose: Nose normal.      Mouth/Throat:      Mouth: Mucous membranes are moist.   Eyes:      Extraocular Movements: Extraocular movements intact.   Cardiovascular:      Rate and Rhythm: Normal rate.   Pulmonary:      Effort: Pulmonary effort is normal.   Abdominal:      General: Bowel sounds are normal.      Palpations: Abdomen is soft.   Musculoskeletal:         General: Normal range of motion.      Cervical back: Neck supple.   Skin:     General: Skin is warm and dry.   Neurological:      Mental Status: She is alert.   Psychiatric:         Mood and Affect: Mood normal.               Diagnoses and health risks identified today and associated recommendations/orders:    Problem List Items Addressed This Visit          Psychiatric    Recurrent major depressive disorder, in full remission    Relevant Medications    citalopram (CELEXA) 20 MG tablet       ENT    Seasonal allergic rhinitis due to pollen    Relevant Medications    cetirizine (ZYRTEC) 10 MG tablet       Cardiac/Vascular    Essential hypertension    Condition is stable. Counseled on following low sodium diet at home. Continue current plan.           Hyperlipidemia    Lab Results   Component Value Date    CHOL 210 (H) 01/14/2025    CHOL 172 06/20/2024    CHOL 167 01/12/2024     Lab Results   Component Value Date    HDL 53 01/14/2025    HDL 66 (H) 06/20/2024    HDL 68 (H) " 01/12/2024     Lab Results   Component Value Date    LDLCALC 132 01/14/2025    LDLCALC 87 06/20/2024    LDLCALC 83 01/12/2024     Lab Results   Component Value Date    TRIG 127 01/14/2025    TRIG 94 06/20/2024    TRIG 78 01/12/2024       Lab Results   Component Value Date    CHOLHDL 4.0 01/14/2025    CHOLHDL 2.6 06/20/2024    CHOLHDL 2.5 01/12/2024     Counseled on following low cholesterol diet at home.   Continue current plan             Oncology    Colon cancer       Endocrine    Type 2 diabetes mellitus without complication, without long-term current use of insulin    Hemoglobin A1C   Date Value Ref Range Status   01/14/2025 7.3 (H) <=7.0 % Final     Comment:       Normal:               <5.7%  Pre-Diabetic:       5.7% to 6.4%  Diabetic:             >6.4%  Diabetic Goal:     <7%   06/20/2024 6.2 4.5 - 6.6 % Final     Comment:       Normal:               <5.7%  Pre-Diabetic:       5.7% to 6.4%  Diabetic:             >6.4%  Diabetic Goal:     <7%   01/12/2024 6.3 4.5 - 6.6 % Final     Comment:       Normal:               <5.7%  Pre-Diabetic:       5.7% to 6.4%  Diabetic:             >6.4%  Diabetic Goal:     <7%     She has lost weight since last visit; discussed diet and exercise. Continue current plan.         Relevant Medications    glipiZIDE 5 MG TR24    JANUMET -1,000 mg TM24    Vitamin B 12 deficiency    Relevant Medications    ergocalciferol (ERGOCALCIFEROL) 50,000 unit Cap       Orthopedic    Arthritis of right hip    Relevant Medications    celecoxib (CELEBREX) 100 MG capsule    Osteoarthritis of both knees    Relevant Medications    celecoxib (CELEBREX) 100 MG capsule     Other Visit Diagnoses         Encounter for subsequent annual wellness visit (AWV) in Medicare patient    -  Primary      Vitamin D deficiency          Arthritis          Encounter for osteoporosis screening in asymptomatic postmenopausal patient        Relevant Orders    DXA Bone Density Axial Skeleton 1 or more sites      Morbid  obesity with BMI of 40.0-44.9, adult                Provided Saray with a 5-10 year written screening schedule and personal prevention plan. Recommendations were developed using the USPSTF age appropriate recommendations. Education, counseling, and referrals were provided as needed. After Visit Summary printed and given to patient which includes a list of additional screenings\tests needed.    Patient declines vaccines at this time , will follow up with pharmacy at later date if desired.     DEXA/ Bone density - ordered today to be done at [facility]     Eye exam - scheduled  for 3/19/2025 with Dr Geiger at [facility]     Follow up for 1 year annual wellness.    DELFINA Clark     I offered to discuss advanced care planning, including how to pick a person who would make decisions for you if you were unable to make them for yourself, called a health care power of , and what kind of decisions you might make such as use of life sustaining treatments such as ventilators and tube feeding when faced with a life limiting illness recorded on a living will that they will need to know. (How you want to be cared for as you near the end of your natural life)     X Patient is interested in learning more about how to make advanced directives.  I provided them paperwork and offered to discuss this with them.

## 2025-03-27 DIAGNOSIS — E78.5 HYPERLIPIDEMIA, UNSPECIFIED HYPERLIPIDEMIA TYPE: ICD-10-CM

## 2025-03-27 RX ORDER — ATORVASTATIN CALCIUM 80 MG/1
80 TABLET, FILM COATED ORAL DAILY
Qty: 90 TABLET | Refills: 0 | Status: SHIPPED | OUTPATIENT
Start: 2025-03-27 | End: 2026-03-27

## 2025-03-31 PROBLEM — J02.9 SORE THROAT: Status: RESOLVED | Noted: 2025-01-31 | Resolved: 2025-03-31

## 2025-03-31 PROBLEM — R09.81 NASAL CONGESTION: Status: RESOLVED | Noted: 2025-01-31 | Resolved: 2025-03-31

## 2025-03-31 PROBLEM — R05.9 COUGH: Status: RESOLVED | Noted: 2025-01-31 | Resolved: 2025-03-31

## 2025-04-01 NOTE — ASSESSMENT & PLAN NOTE
Lab Results   Component Value Date    CHOL 210 (H) 01/14/2025    CHOL 172 06/20/2024    CHOL 167 01/12/2024     Lab Results   Component Value Date    HDL 53 01/14/2025    HDL 66 (H) 06/20/2024    HDL 68 (H) 01/12/2024     Lab Results   Component Value Date    LDLCALC 132 01/14/2025    LDLCALC 87 06/20/2024    LDLCALC 83 01/12/2024     Lab Results   Component Value Date    TRIG 127 01/14/2025    TRIG 94 06/20/2024    TRIG 78 01/12/2024       Lab Results   Component Value Date    CHOLHDL 4.0 01/14/2025    CHOLHDL 2.6 06/20/2024    CHOLHDL 2.5 01/12/2024     Counseled on following low cholesterol diet at home.   Continue current plan

## 2025-04-01 NOTE — ASSESSMENT & PLAN NOTE
Hemoglobin A1C   Date Value Ref Range Status   01/14/2025 7.3 (H) <=7.0 % Final     Comment:       Normal:               <5.7%  Pre-Diabetic:       5.7% to 6.4%  Diabetic:             >6.4%  Diabetic Goal:     <7%   06/20/2024 6.2 4.5 - 6.6 % Final     Comment:       Normal:               <5.7%  Pre-Diabetic:       5.7% to 6.4%  Diabetic:             >6.4%  Diabetic Goal:     <7%   01/12/2024 6.3 4.5 - 6.6 % Final     Comment:       Normal:               <5.7%  Pre-Diabetic:       5.7% to 6.4%  Diabetic:             >6.4%  Diabetic Goal:     <7%     She has lost weight since last visit; discussed diet and exercise. Continue current plan.

## 2025-04-08 ENCOUNTER — OFFICE VISIT (OUTPATIENT)
Dept: FAMILY MEDICINE | Facility: CLINIC | Age: 70
End: 2025-04-08
Payer: COMMERCIAL

## 2025-04-08 VITALS
BODY MASS INDEX: 41.95 KG/M2 | HEIGHT: 70 IN | DIASTOLIC BLOOD PRESSURE: 88 MMHG | OXYGEN SATURATION: 98 % | TEMPERATURE: 99 F | SYSTOLIC BLOOD PRESSURE: 130 MMHG | RESPIRATION RATE: 18 BRPM | WEIGHT: 293 LBS | HEART RATE: 93 BPM

## 2025-04-08 DIAGNOSIS — M54.41 ACUTE RIGHT-SIDED LOW BACK PAIN WITH RIGHT-SIDED SCIATICA: Primary | ICD-10-CM

## 2025-04-08 PROCEDURE — 96372 THER/PROPH/DIAG INJ SC/IM: CPT | Mod: ,,, | Performed by: STUDENT IN AN ORGANIZED HEALTH CARE EDUCATION/TRAINING PROGRAM

## 2025-04-08 PROCEDURE — 99213 OFFICE O/P EST LOW 20 MIN: CPT | Mod: 25,,, | Performed by: STUDENT IN AN ORGANIZED HEALTH CARE EDUCATION/TRAINING PROGRAM

## 2025-04-08 RX ORDER — KETOROLAC TROMETHAMINE 30 MG/ML
30 INJECTION, SOLUTION INTRAMUSCULAR; INTRAVENOUS
Status: COMPLETED | OUTPATIENT
Start: 2025-04-08 | End: 2025-04-08

## 2025-04-08 RX ADMIN — KETOROLAC TROMETHAMINE 30 MG: 30 INJECTION, SOLUTION INTRAMUSCULAR; INTRAVENOUS at 04:04

## 2025-04-08 NOTE — PROGRESS NOTES
Progress Note     TATE ANDERSON MD   [address]  [place] [address]     PATIENT NAME: Saray Jorgensen  : 1955  DATE: 25  MRN: [id number]      Billing Provider: TATE ANDERSON MD  Level of Service:   Patient PCP Information       Provider PCP Type    Kailee Garcia, FNP General                Hip Pain (Pt presents to clinic with right sided hip pain that started this past . She states it makes it difficult for her to stand and walk. She is also unable to lay on it. )      SUBJECTIVE:     Saray Jorgensen is a 69 y.o.female who presents to clinic for Hip Pain (Pt presents to clinic with right sided hip pain that started this past . She states it makes it difficult for her to stand and walk. She is also unable to lay on it. )    Patient presents to clinic today with right hip pain.  She describes it as right-sided low back pain with radiation down to her foot on occasion.  It is worse with certain activities particularly when trying to get in and out of a car.  She noticed it a few weeks ago.  She has had similar symptoms in the past and was provided with Decadron and Toradol with relief.  However, patient is a diabetic and her last hemoglobin A1c was greater than 7.  Patient is on Celebrex and only took 1 tablet earlier this morning.  Patient denies any numbness or weakness.      All other pertinent review of systems negative. Please see HPI for details.     Past Medical History:  has a past medical history of Colon cancer, Diabetes, Diabetes mellitus, type 2, Gallstones, History of colonoscopy (2014), Hyperlipidemia, and Hypertension.   Past Surgical History:  has a past surgical history that includes colectomy, right; hysterectomy, total, laparoscopic, with salpingo-oophorectomy (Bilateral, 2023); cystoscopy (N/A, 2023); Hysterectomy; and Colon surgery.  Family History: family history includes Diabetes in her brother,  "mother, and sister; Hypertension in her brother, mother, and sister; Prostate cancer in her father.  Social History:  reports that she has never smoked. She has never been exposed to tobacco smoke. She has never used smokeless tobacco. She reports that she does not drink alcohol and does not use drugs.  Allergies: Review of patient's allergies indicates:  No Known Allergies    Current Medications[1]   OBJECTIVE:     Vital Signs   /88 (BP Location: Left arm, Patient Position: Sitting)   Pulse 93   Temp 98.7 °F (37.1 °C) (Oral)   Resp 18   Ht 5' 10" (1.778 m)   Wt 135.9 kg (299 lb 9.6 oz)   SpO2 98%   BMI 42.99 kg/m²     Physical Exam  Constitutional:       General: She is not in acute distress.     Appearance: Normal appearance. She is not ill-appearing, toxic-appearing or diaphoretic.   HENT:      Head: Normocephalic and atraumatic.      Mouth/Throat:      Mouth: Mucous membranes are moist.      Pharynx: No oropharyngeal exudate or posterior oropharyngeal erythema.   Eyes:      Extraocular Movements: Extraocular movements intact.      Pupils: Pupils are equal, round, and reactive to light.   Cardiovascular:      Rate and Rhythm: Normal rate and regular rhythm.      Pulses: Normal pulses.      Heart sounds: Normal heart sounds. No murmur heard.     No friction rub. No gallop.   Pulmonary:      Effort: Pulmonary effort is normal. No respiratory distress.      Breath sounds: No wheezing, rhonchi or rales.   Abdominal:      General: Abdomen is flat.      Palpations: Abdomen is soft.      Tenderness: There is no abdominal tenderness. There is no guarding or rebound.   Musculoskeletal:         General: Normal range of motion.      Cervical back: Normal range of motion.      Comments: Tenderness to palpation over right side low back.  Negative straight leg raise test.   Skin:     General: Skin is warm and dry.      Capillary Refill: Capillary refill takes less than 2 seconds.   Neurological:      General: No " focal deficit present.      Mental Status: She is alert.   Psychiatric:         Mood and Affect: Mood normal.         Behavior: Behavior normal.         ASSESSMENT/PLAN:     1. Acute right-sided low back pain with right-sided sciatica  -     X-Ray Lumbar Spine AP And Lateral; Future; Expected date: 04/08/2025  -     ketorolac injection 30 mg        Patient with low back pain with intermittent right-sided sciatica.  Obtaining x-ray lumbar spine as this is the 2nd time she has had a flare of similar symptoms.  Providing Toradol shot.  Counseled patient that she could take Tylenol as well.  Patient may also take Celebrex each morning but needs to avoid other NSAIDs.    Follow up if symptoms worsen or fail to improve.      TATE ANDERSON MD  04/08/2025    Due to voice recognition software, sound alike and misspelled words may be contained in the documentation.              [1]   Current Outpatient Medications:     ascorbic acid-multivit-min (VITAMIN C ENERGY BOOSTER) 1,000 mg PwEP, Take 1 packet by mouth once daily., Disp: , Rfl:     aspirin 500 MG EC tablet, Take 500 mg by mouth once daily., Disp: , Rfl:     atorvastatin (LIPITOR) 80 MG tablet, Take 1 tablet (80 mg total) by mouth once daily. for cholesterol, Disp: 90 tablet, Rfl: 0    celecoxib (CELEBREX) 100 MG capsule, Take 1 capsule (100 mg total) by mouth once daily., Disp: 90 capsule, Rfl: 1    cetirizine (ZYRTEC) 10 MG tablet, Take 1 tablet (10 mg total) by mouth once daily. for allergies, Disp: 90 tablet, Rfl: 1    citalopram (CELEXA) 20 MG tablet, Take 1 tablet (20 mg total) by mouth once daily., Disp: 90 tablet, Rfl: 1    diclofenac sodium (VOLTAREN) 1 % Gel, Apply 2 g topically 2 (two) times daily., Disp: 100 g, Rfl: 2    ergocalciferol (ERGOCALCIFEROL) 50,000 unit Cap, Take 1 capsule (50,000 Units total) by mouth once every 7 days., Disp: 13 capsule, Rfl: 1    fluticasone propionate (FLONASE) 50 mcg/actuation nasal spray, 1 spray by Each Nostril  route once daily., Disp: , Rfl:     furosemide (LASIX) 40 MG tablet, Take 1 tablet (40 mg total) by mouth once daily., Disp: 90 tablet, Rfl: 1    GARLIC ORAL, Take 1 tablet by mouth once daily., Disp: , Rfl:     glipiZIDE 5 MG TR24, TAKE ONE TABLET BY MOUTH ONCE DAILY WITH BREAKFAST, Disp: 90 tablet, Rfl: 1    hydroCHLOROthiazide (HYDRODIURIL) 12.5 MG Tab, Take 1-2 tablets (12.5-25 mg total) by mouth once daily., Disp: 180 tablet, Rfl: 1    JANUMET -1,000 mg TM24, Take 1 tablet by mouth once daily., Disp: 90 tablet, Rfl: 1    multivitamin (THERAGRAN) per tablet, Take 1 tablet by mouth once daily., Disp: , Rfl:     olopatadine (PATADAY) 0.2 % Drop, , Disp: , Rfl:     triamcinolone acetonide 0.1% (KENALOG) 0.1 % cream, Apply topically 2 (two) times daily., Disp: 80 g, Rfl: 1    ascorbic acid-multivit-min (VITAMIN C FIZZY DRINK) 1,000 mg PwEP, Take 1 packet by mouth once daily. (Patient not taking: Reported on 4/8/2025), Disp: , Rfl:     aspirin 325 MG tablet, Take 1 tablet (325 mg total) by mouth once daily. (Patient not taking: Reported on 4/8/2025), Disp: 90 tablet, Rfl: 2    Current Facility-Administered Medications:     ketorolac injection 30 mg, 30 mg, Intramuscular, 1 time in Clinic/HOD, Olesya Cardoza MD

## 2025-04-10 ENCOUNTER — HOSPITAL ENCOUNTER (OUTPATIENT)
Dept: RADIOLOGY | Facility: HOSPITAL | Age: 70
Discharge: HOME | End: 2025-04-10
Attending: NURSE PRACTITIONER
Payer: COMMERCIAL

## 2025-04-10 ENCOUNTER — TELEPHONE (OUTPATIENT)
Dept: FAMILY MEDICINE | Facility: CLINIC | Age: 70
End: 2025-04-10
Payer: COMMERCIAL

## 2025-04-10 ENCOUNTER — RESULTS FOLLOW-UP (OUTPATIENT)
Dept: FAMILY MEDICINE | Facility: CLINIC | Age: 70
End: 2025-04-10

## 2025-04-10 ENCOUNTER — HOSPITAL ENCOUNTER (OUTPATIENT)
Dept: RADIOLOGY | Facility: HOSPITAL | Age: 70
Discharge: HOME | End: 2025-04-10
Attending: STUDENT IN AN ORGANIZED HEALTH CARE EDUCATION/TRAINING PROGRAM
Payer: COMMERCIAL

## 2025-04-10 DIAGNOSIS — M54.41 ACUTE RIGHT-SIDED LOW BACK PAIN WITH RIGHT-SIDED SCIATICA: ICD-10-CM

## 2025-04-10 DIAGNOSIS — Z78.0 ENCOUNTER FOR OSTEOPOROSIS SCREENING IN ASYMPTOMATIC POSTMENOPAUSAL PATIENT: ICD-10-CM

## 2025-04-10 DIAGNOSIS — M54.41 ACUTE RIGHT-SIDED LOW BACK PAIN WITH RIGHT-SIDED SCIATICA: Primary | ICD-10-CM

## 2025-04-10 DIAGNOSIS — Z13.820 ENCOUNTER FOR OSTEOPOROSIS SCREENING IN ASYMPTOMATIC POSTMENOPAUSAL PATIENT: ICD-10-CM

## 2025-04-10 PROCEDURE — 77080 DXA BONE DENSITY AXIAL: CPT | Mod: TC

## 2025-04-10 PROCEDURE — 72100 X-RAY EXAM L-S SPINE 2/3 VWS: CPT | Mod: TC

## 2025-04-10 NOTE — TELEPHONE ENCOUNTER
----- Message from Olesya Cardoza MD sent at 4/10/2025  4:10 PM CDT -----  Please let patient know that she does have disc space narrowing and bone spur formation in her lumbar spine.  These findings are likely contributing to her pain.  If pain persists, she may benefit   from physical therapy or additional imaging.  ----- Message -----  From: Interface, Rad Results In  Sent: 4/10/2025   3:55 PM CDT  To: Olesya Cardoza MD

## 2025-04-10 NOTE — PROGRESS NOTES
Please let patient know that she does have disc space narrowing and bone spur formation in her lumbar spine.  These findings are likely contributing to her pain.  If pain persists, she may benefit from physical therapy or additional imaging.

## 2025-04-15 NOTE — TELEPHONE ENCOUNTER
Ana López MA discussed results with pt in person. Verbalized understanding. She stated that she did want to do physical therapy but would like to be set up next door for travel convenience. Please advise.

## 2025-04-16 ENCOUNTER — RESULTS FOLLOW-UP (OUTPATIENT)
Dept: FAMILY MEDICINE | Facility: CLINIC | Age: 70
End: 2025-04-16

## 2025-04-16 NOTE — PROGRESS NOTES
Discussed lab results and recommendations with pt via phone  No noted concerns  Voiced understanding

## 2025-04-24 ENCOUNTER — CLINICAL SUPPORT (OUTPATIENT)
Dept: REHABILITATION | Facility: HOSPITAL | Age: 70
End: 2025-04-24
Payer: COMMERCIAL

## 2025-04-24 DIAGNOSIS — M25.562 LEFT KNEE PAIN, UNSPECIFIED CHRONICITY: ICD-10-CM

## 2025-04-24 DIAGNOSIS — M54.41 ACUTE RIGHT-SIDED LOW BACK PAIN WITH RIGHT-SIDED SCIATICA: Primary | ICD-10-CM

## 2025-04-24 PROCEDURE — 97161 PT EVAL LOW COMPLEX 20 MIN: CPT | Mod: PN

## 2025-04-24 PROCEDURE — 97110 THERAPEUTIC EXERCISES: CPT | Mod: PN

## 2025-04-24 NOTE — PROGRESS NOTES
Outpatient Rehab    Physical Therapy Evaluation    Patient Name: Saray Jorgensen  MRN: [id number]  YOB: 1955  Encounter Date: 4/24/2025    Therapy Diagnosis:   Encounter Diagnosis   Name Primary?    Acute right-sided low back pain with right-sided sciatica      Physician: Olesya Cardoza*    Physician Orders: Eval and Treat  Medical Diagnosis: Acute right-sided low back pain with right-sided sciatica    Visit # / Visits Authorized:  1 / 1  Insurance Authorization Period: 4/15/2025 to 4/15/2026  Date of Evaluation: 4/24/2025  Plan of Care Certification: 4/24/2025 to 5/24/2025      Time In: 1315   Time Out: 1415  Total Time: 60   Total Billable Time:  60    Intake Outcome Measure for FOTO Survey    Therapist reviewed FOTO scores for Saray Jorgensen on 4/24/2025.   FOTO report - see Media section or FOTO account episode details.     Intake Score:  %         Subjective   History of Present Illness  Saray is a 69 y.o. female who reports to physical therapy with a chief concern of right side low back pain si joint region.     The patient reports a medical diagnosis of acute right side sciatica. The patient has experienced this issue since 04/24/25.           Dominant Hand: Left  History of Present Condition/Illness: Pt states she has been having low back pain for over a month. Pt voices increased pain with standing , bending , internal rotation and hip extension. Pt voices prolonged pain with bending and with internal rotation. Pt voices she went to dr and got some meds and it improved some.  Pt states she has tingling in her lateral right thigh and down her lateral leg.     Pain     Patient reports a current pain level of 7/10. Pain at best is reported as 1/10. Pain at worst is reported as 10/10.   Clinical Progression (since onset): Stable  Pain Qualities: Discomfort, Aching, Pulling, Tenderness, Tightness  Pain-Relieving Factors: Activity modification, Rest, Movement  Pain-Aggravating Factors:  Bending, Exercise, Lifting, Sitting, Standing, Twisting, Walking, Lying down         Living Arrangements  Living Situation  Living Arrangements: Family members  Support Systems: Children        Employment  Employment Status: On disability          Past Medical History/Physical Systems Review:   Saray Jorgensen  has a past medical history of Colon cancer, Diabetes, Diabetes mellitus, type 2, Gallstones, History of colonoscopy, Hyperlipidemia, and Hypertension.    Saray Jorgensen  has a past surgical history that includes colectomy, right; hysterectomy, total, laparoscopic, with salpingo-oophorectomy (Bilateral, 05/31/2023); cystoscopy (N/A, 05/31/2023); Hysterectomy; and Colon surgery.    Saray has a current medication list which includes the following prescription(s): vitamin c energy booster, vitamin c fizzy drink, aspirin, aspirin, atorvastatin, celecoxib, cetirizine, citalopram, diclofenac sodium, ergocalciferol, fluticasone propionate, furosemide, garlic, glipizide, hydrochlorothiazide, janumet xr, multivitamin, olopatadine, and triamcinolone acetonide 0.1%.    Review of patient's allergies indicates:  No Known Allergies     Objective       Hip Range of Motion   Right Hip   Active (deg) Passive (deg) Pain   Flexion 90       Extension 8       ABduction         ADduction         External Rotation 90/90         External Rotation Prone         Internal Rotation 90/90 20       Internal Rotation Prone             Left Hip   Active (deg) Passive (deg) Pain   Flexion 95       Extension -5       ABduction         ADduction         External Rotation 90/90         External Rotation Prone         Internal Rotation 90/90 30       Internal Rotation Prone                            Hip Strength - Planes of Motion   Right Strength Right Pain Left Strength Left  Pain   Flexion (L2) 3+   4+     Extension 3-   4+     ABduction 3   4+     ADduction 4+   4+     Internal Rotation 3   4+     External Rotation 3+   4+         Knee  Strength   Right Strength Right Pain Left Strength Left  Pain   Flexion (S2) 5   5     Prone Flexion           Extension (L3) 5   5                    Gait Analysis  Base of Support: Wide  Gait Pattern: Antalgic    Right Side Walking Observations  Decreased: Stance Time                 Treatment:  Therapeutic Exercise  TE 1: pt received home ex program see attatched.      Time Entry(in minutes):       Assessment & Plan   Assessment  Saray presents with a condition of Low complexity.   Presentation of Symptoms: Evolving  Will Comorbidities Impact Care: No       Functional Limitations: Activity tolerance, Disrupted sleep pattern, Standing tolerance, Squatting, Sitting tolerance, Painful locomotion/ambulation, Pain with ADLs/IADLs, Pain when reaching, Range of motion, Functional mobility, Completing work/school activities  Impairments: Abnormal or restricted range of motion, Activity intolerance, Abnormal muscle firing, Pain with functional activity  Personal Factors Affecting Prognosis: Pain, Physical limitations    Prognosis: Good  Assessment Details: Pt has decreased range of motion right hip vs left: internal rotation , hip extension, hip flexion . Tight adductor himanshu , tight glutes and paraspinals, pt has pain with sitting prolonged and pain with palpation of right si joint , asis , psis .  Pt voices instant relief with cavitation of right si joint after muscle energy techniques of activating glutes     Plan  From a physical therapy perspective, the patient would benefit from: Skilled Rehab Services    Planned therapy interventions include: Therapeutic exercise, Therapeutic activities, Neuromuscular re-education, and Manual therapy.    Planned modalities to include: Electrical stimulation - passive/unattended and Ultrasound.        Visit Frequency: 2 times Per Week for 4 Weeks.       This plan was discussed with Patient and Therapy assistant.   Discussion participants: Agreed Upon Plan of Care  Plan details:  Plan Plan of care Certification: 4/24/2025 to 5/24/2025 . Outpatient Physical Therapy 2 times weekly for 4 weeks to include the following interventions: Electrical Stimulation ifc , Manual Therapy, Neuromuscular Re-ed, Patient Education, Therapeutic Activities, and Therapeutic Exercise.  Plan of care has been reestablished with Naomy BARR and Johnna Walton, PT           Patient's spiritual, cultural, and educational needs considered and patient agreeable to plan of care and goals.     Education  Education was done with Patient. The patient's learning style includes Listening, Demonstration, and Pictures/video. The patient Verbalizes understanding.         Pt received home ex program        Goals:   Active       long term goals        pt will be able to tolerate shopping x 1 hour        Start:  04/24/25    Expected End:  06/24/25            pt will be able to increase strength to 5/5        Start:  04/24/25    Expected End:  06/24/25            pt will be able to sit pain free        Start:  04/24/25    Expected End:  06/24/25               short term goals        pt will be independent with home ex program        Start:  04/24/25    Expected End:  05/24/25            pt will be able to increase bilateral hip flexion to 110 degrees        Start:  04/24/25    Expected End:  05/24/25            be able to increase all mmt to 4/5 in lower extremity        Start:  04/24/25    Expected End:  05/24/25            decrease worse pain down to 5/10        Start:  04/24/25    Expected End:  05/24/25                Michael Walton PT    Clinical Information for Insurance Authorization     Dates of Services: 4/24/2025  to 5/24/2025   ICD-10 Diagnosis Code(s):   CPT Codes Requested:  23832 [neuromuscular re-education], 16279 [manual therapy], 80904 [therapeutic activities], 88609 [ultrasound], and 32332 [unattended electrical stimulation]

## 2025-05-02 ENCOUNTER — CLINICAL SUPPORT (OUTPATIENT)
Dept: REHABILITATION | Facility: HOSPITAL | Age: 70
End: 2025-05-02
Payer: COMMERCIAL

## 2025-05-02 DIAGNOSIS — M54.41 ACUTE RIGHT-SIDED LOW BACK PAIN WITH RIGHT-SIDED SCIATICA: Primary | ICD-10-CM

## 2025-05-02 DIAGNOSIS — M25.69 DECREASED RANGE OF MOTION OF TRUNK AND BACK: ICD-10-CM

## 2025-05-02 PROCEDURE — 97112 NEUROMUSCULAR REEDUCATION: CPT | Mod: PN,CQ

## 2025-05-02 PROCEDURE — 97530 THERAPEUTIC ACTIVITIES: CPT | Mod: PN,CQ

## 2025-05-02 NOTE — PROGRESS NOTES
Outpatient Rehab    Physical Therapy Visit    Patient Name: Saray Jorgensen  MRN: [id number]  YOB: 1955  Encounter Date: 5/2/2025    Therapy Diagnosis:   Encounter Diagnoses   Name Primary?    Acute right-sided low back pain with right-sided sciatica Yes    Decreased range of motion of trunk and back      Physician: Olesya Cardoza*    Physician Orders: Eval and Treat  Medical Diagnosis: Acute right-sided low back pain with right-sided sciatica    Visit # / Visits Authorized:  1 / 8  Pta visit#1  Insurance Authorization Period: 4/25/2025 to 5/24/2025    Time In:   1230  Time Out:  115  Total Time:     Total Billable Time:  45    FOTO:  Intake Score:  %  Survey Score 1:  %  Survey Score 2:  %         Subjective   pt states she feels much better..  Pain reported as 1/10.      Objective            Treatment:  Balance/Neuromuscular Re-Education  NMR 1: slant board 4 x 15 s/h  NMR 2: margy hs stretch on step x 10  NMR 3: swsissball trunk flexion and rot x 10  NMR 4: hip add with bolster x 10  Therapeutic Activity  TA 1: biking x 5' to promote endurance with walking  TA 2: ds squats tg x 20 to simulate bending and squatting  TA 3: ds leg presses 20 x 60# to simulate bending and squtting  TA 4: margy hip abd 20# x 8 to promote pelvic stability with walking  TA 5: swissball trunk flexion roll outs x 10 to promote donning/doffing shoes  TA 6: supine margy  slr x 10 to simulate getting in and out of tub  TA 7: margy sitting hip flexion x 20 to simulate getting in and out or car    Time Entry(in minutes):  Neuromuscular Re-Education Time Entry: 15  Therapeutic Activity Time Entry: 30    Assessment & Plan   Assessment: pt performed all activites with good effort, voicing no pain after rx       Patient will continue to benefit from skilled outpatient physical therapy to address the deficits listed in the problem list box on initial evaluation, provide pt/family education and to maximize pt's level of independence  in the home and community environment.     Patient's spiritual, cultural, and educational needs considered and patient agreeable to plan of care and goals.           Plan:      Goals:   Active       long term goals        pt will be able to tolerate shopping x 1 hour        Start:  04/24/25    Expected End:  06/24/25            pt will be able to increase strength to 5/5        Start:  04/24/25    Expected End:  06/24/25            pt will be able to sit pain free        Start:  04/24/25    Expected End:  06/24/25               short term goals        pt will be independent with home ex program        Start:  04/24/25    Expected End:  05/24/25            pt will be able to increase bilateral hip flexion to 110 degrees        Start:  04/24/25    Expected End:  05/24/25            be able to increase all mmt to 4/5 in lower extremity        Start:  04/24/25    Expected End:  05/24/25            decrease worse pain down to 5/10        Start:  04/24/25    Expected End:  05/24/25            Plan of care Certification: 4/24/2025 to 5/24/2025 . Outpatient Physical Therapy 2 times weekly for 4 weeks to include the following interventions: Electrical Stimulation ifc , Manual Therapy, Neuromuscular Re-ed, Patient Education, Therapeutic Activities, and Therapeutic Exercise.     Yamileth Cisse, PTA

## 2025-05-08 ENCOUNTER — RESULTS FOLLOW-UP (OUTPATIENT)
Dept: FAMILY MEDICINE | Facility: CLINIC | Age: 70
End: 2025-05-08

## 2025-05-08 DIAGNOSIS — M85.80 OSTEOPENIA, UNSPECIFIED LOCATION: Primary | ICD-10-CM

## 2025-05-08 RX ORDER — ALENDRONATE SODIUM 70 MG/1
70 TABLET ORAL
Qty: 12 TABLET | Refills: 1 | Status: SHIPPED | OUTPATIENT
Start: 2025-05-08 | End: 2026-05-08

## 2025-05-08 NOTE — PROGRESS NOTES
Discussed DEXA results and recommendations with pt via phone  No noted concerns  Voiced understanding

## 2025-05-14 ENCOUNTER — CLINICAL SUPPORT (OUTPATIENT)
Dept: REHABILITATION | Facility: HOSPITAL | Age: 70
End: 2025-05-14
Payer: COMMERCIAL

## 2025-05-14 DIAGNOSIS — M54.41 ACUTE RIGHT-SIDED LOW BACK PAIN WITH RIGHT-SIDED SCIATICA: Primary | ICD-10-CM

## 2025-05-14 DIAGNOSIS — M25.69 DECREASED RANGE OF MOTION OF TRUNK AND BACK: ICD-10-CM

## 2025-05-14 PROCEDURE — 97112 NEUROMUSCULAR REEDUCATION: CPT | Mod: PN,CQ

## 2025-05-14 PROCEDURE — 97530 THERAPEUTIC ACTIVITIES: CPT | Mod: PN,CQ

## 2025-05-14 NOTE — PROGRESS NOTES
Outpatient Rehab    Physical Therapy Visit    Patient Name: Saray Jorgensen  MRN: [id number]  YOB: 1955  Encounter Date: 5/14/2025    Therapy Diagnosis:   Encounter Diagnoses   Name Primary?    Acute right-sided low back pain with right-sided sciatica Yes    Decreased range of motion of trunk and back      Physician: Olesya Cardoza*    Physician Orders: Eval and Treat  Medical Diagnosis: Acute right-sided low back pain with right-sided sciatica    Visit # / Visits Authorized:  2 / 8  Pta visit# 2  Insurance Authorization Period: 4/25/2025 to 5/24/2025  Date of Evaluation: 4/24/2025  Plan of Care Certification: 4/24/2025 to 5/24/2025         Time In:   1100  Time Out:  1140  Total Time (in minutes):     Total Billable Time (in minutes): 40     FOTO:  Intake Score:  %  Survey Score 2:  %  Survey Score 3:  %    Precautions:       Subjective   I'm doing better.  Pain reported as 1/10.      Objective       Hip Range of Motion   Right Hip   Active (deg) Passive (deg) Pain   Flexion 102       Extension         ABduction         ADduction         External Rotation 90/90         External Rotation Prone         Internal Rotation 90/90         Internal Rotation Prone             Left Hip   Active (deg) Passive (deg) Pain   Flexion 105       Extension         ABduction         ADduction         External Rotation 90/90         External Rotation Prone         Internal Rotation 90/90         Internal Rotation Prone                         Treatment:  Balance/Neuromuscular Re-Education  NMR 1: slant board 4 x 15 s/h  NMR 2: margy hs stretch on step x 10  NMR 3: swsissball trunk flexion and rot x 10  NMR 4: hip add with bolster and bridging x 10  NMR 5: prone props with glut set x 10  Therapeutic Activity  TA 1: biking x 5' to promote endurance with walking  TA 2: ds squats tg x 20 to simulate bending and squatting  TA 3: margy ss leg presses 20 x 55# to simulate bending and squatting  TA 5: swissball trunk  flexion roll outs x 10 to promote donning/doffing shoes  TA 6: supine margy  slr x 10 to simulate getting in and out of tub  TA 7: margy sitting hip flexion x 20 to simulate getting in and out or car    Time Entry(in minutes):       Assessment & Plan   Assessment:         Patient will continue to benefit from skilled outpatient physical therapy to address the deficits listed in the problem list box on initial evaluation, provide pt/family education and to maximize pt's level of independence in the home and community environment.     Patient's spiritual, cultural, and educational needs considered and patient agreeable to plan of care and goals.           Plan:      Goals:   Active       long term goals        pt will be able to tolerate shopping x 1 hour        Start:  04/24/25    Expected End:  06/24/25            pt will be able to increase strength to 5/5        Start:  04/24/25    Expected End:  06/24/25            pt will be able to sit pain free        Start:  04/24/25    Expected End:  06/24/25               short term goals        pt will be independent with home ex program        Start:  04/24/25    Expected End:  05/24/25            pt will be able to increase bilateral hip flexion to 110 degrees        Start:  04/24/25    Expected End:  05/24/25            be able to increase all mmt to 4/5 in lower extremity        Start:  04/24/25    Expected End:  05/24/25            decrease worse pain down to 5/10        Start:  04/24/25    Expected End:  05/24/25            Plan of care Certification: 4/24/2025 to 5/24/2025 . Outpatient Physical Therapy 2 times weekly for 4 weeks to include the following interventions: Electrical Stimulation ifc , Manual Therapy, Neuromuscular Re-ed, Patient Education, Therapeutic Activities, and Therapeutic Exercise.     Yamileth Cisse, PTA

## 2025-05-19 ENCOUNTER — PATIENT OUTREACH (OUTPATIENT)
Facility: HOSPITAL | Age: 70
End: 2025-05-19
Payer: COMMERCIAL

## 2025-05-19 NOTE — LETTER
AUTHORIZATION FOR RELEASE OF   CONFIDENTIAL INFORMATION    [facility],    We are seeing Saray Jorgensen, date of birth 1955, in the clinic at [facility]. Kailee Garcia FNP is the patient's PCP. Saray Jorgensen has an outstanding lab/procedure at the time we reviewed her chart. In order to help keep her health information updated, she has authorized us to request the following medical record(s):        (  )  MAMMOGRAM                                      (  )  COLONOSCOPY      (  )  PAP SMEAR                                          (  )  OUTSIDE LAB RESULTS     (  )  DEXA SCAN                                          (X)  EYE EXAM            (  )  FOOT EXAM                                          (  )  ENTIRE RECORD     (  )  OUTSIDE IMMUNIZATIONS                 (  )  _______________         Please fax records to [facility] Coordinator, Carina Fagan, [phone].     If you have any questions, please contact [phone].          Patient Name: Saray Jorgensen  : 1955  Patient Phone #: [phone]

## 2025-05-19 NOTE — PROGRESS NOTES
05/19/2025   --Chart accessed for: Care Gaps  --Care Gaps addressed: eye exam  Requested records from [facility] Due   Topic Date Due    COVID-19 Vaccine (1) Never done    TETANUS VACCINE  Never done    Shingles Vaccine (1 of 2) Never done    RSV Vaccine (Age 60+ and Pregnant patients) (1 - Risk 60-74 years 1-dose series) Never done    Diabetic Eye Exam  03/18/2025    Foot Exam  06/20/2025

## 2025-05-21 ENCOUNTER — CLINICAL SUPPORT (OUTPATIENT)
Dept: REHABILITATION | Facility: HOSPITAL | Age: 70
End: 2025-05-21
Payer: COMMERCIAL

## 2025-05-21 DIAGNOSIS — M25.562 LEFT KNEE PAIN, UNSPECIFIED CHRONICITY: ICD-10-CM

## 2025-05-21 DIAGNOSIS — M54.41 ACUTE RIGHT-SIDED LOW BACK PAIN WITH RIGHT-SIDED SCIATICA: Primary | ICD-10-CM

## 2025-05-21 DIAGNOSIS — M25.69 DECREASED RANGE OF MOTION OF TRUNK AND BACK: ICD-10-CM

## 2025-05-21 PROCEDURE — 97530 THERAPEUTIC ACTIVITIES: CPT | Mod: PN,CQ

## 2025-05-21 PROCEDURE — 97112 NEUROMUSCULAR REEDUCATION: CPT | Mod: PN,CQ

## 2025-05-21 NOTE — PROGRESS NOTES
Outpatient Rehab    Physical Therapy Visit    Patient Name: Saray Jorgensen  MRN: [id number]  YOB: 1955  Encounter Date: 5/21/2025    Therapy Diagnosis:   Encounter Diagnoses   Name Primary?    Acute right-sided low back pain with right-sided sciatica Yes    Decreased range of motion of trunk and back     Left knee pain, unspecified chronicity      Physician: Olesya Cardoza*    Physician Orders: Eval and Treat  Medical Diagnosis: Acute right-sided low back pain with right-sided sciatica    Visit # / Visits Authorized:  3 / 8  Pta visit# 3   Insurance Authorization Period: 4/25/2025 to 5/24/2025  Date of Evaluation: 4/24/2025  Plan of Care Certification: 4/24/2025 to 5/24/2025     Time In: 1238    Time Out:  1016  Total Time (in minutes):     Total Billable Time (in minutes):  38    FOTO:  Intake Score:  %  Survey Score 2:  %  Survey Score 3:  %    Precautions:       Subjective   I'm doing good, think I will let today be my last day. I'm not having issues anymore..  Pain reported as 0/10.      Objective       Hip Range of Motion   Right Hip   Active (deg) Passive (deg) Pain   Flexion 105       Extension         ABduction         ADduction         External Rotation 90/90         External Rotation Prone         Internal Rotation 90/90         Internal Rotation Prone             Left Hip   Active (deg) Passive (deg) Pain   Flexion 107       Extension         ABduction         ADduction         External Rotation 90/90         External Rotation Prone         Internal Rotation 90/90         Internal Rotation Prone                         Treatment:  Balance/Neuromuscular Re-Education  NMR 1: slant board 4 x 15 s/h  NMR 2: margy hs stretch on step x 10  NMR 3: swsissball trunk flexion and rot x 10  Therapeutic Activity  TA 1: biking x 5' to promote endurance with walking  TA 2: ds squats tg x 20 to simulate bending and squatting  TA 3: margy ss leg presses 20 x 55# to simulate bending and squatting  TA  5: swissball trunk flexion roll outs x 10 to promote donning/doffing shoes  TA 6: supine margy  slr x 10 to simulate getting in and out of tub    Time Entry(in minutes):       Assessment & Plan   Assessment: pt instructed to be consistent with hep, has progressed well with therapy.       Patient will continue to benefit from skilled outpatient physical therapy to address the deficits listed in the problem list box on initial evaluation, provide pt/family education and to maximize pt's level of independence in the home and community environment.     Patient's spiritual, cultural, and educational needs considered and patient agreeable to plan of care and goals.           Plan:      Goals:   Active       short term goals        pt will be independent with home ex program  (Met)       Start:  04/24/25    Expected End:  05/24/25    Resolved:  05/21/25         pt will be able to increase bilateral hip flexion to 110 degrees        Start:  04/24/25    Expected End:  05/24/25            be able to increase all mmt to 4/5 in lower extremity  (Met)       Start:  04/24/25    Expected End:  05/24/25    Resolved:  05/21/25         decrease worse pain down to 5/10  (Met)       Start:  04/24/25    Expected End:  05/24/25    Resolved:  05/21/25           Resolved       long term goals        pt will be able to tolerate shopping x 1 hour  (Met)       Start:  04/24/25    Expected End:  06/24/25    Resolved:  05/21/25         pt will be able to increase strength to 5/5  (Met)       Start:  04/24/25    Expected End:  06/24/25    Resolved:  05/21/25         pt will be able to sit pain free  (Met)       Start:  04/24/25    Expected End:  06/24/25    Resolved:  05/21/25         Will d/c to home exercise program per patient request, see physical therapist d/c note    Yamileth Cisse, PTA

## 2025-05-21 NOTE — PROGRESS NOTES
Outpatient Therapy Discharge Summary     Name: Saray Jorgensen  Clinic Number: [id number]    Therapy Diagnosis:   Encounter Diagnoses   Name Primary?    Acute right-sided low back pain with right-sided sciatica Yes    Decreased range of motion of trunk and back     Left knee pain, unspecified chronicity      Physician: Olesya Cardoza*       Assessment    Goals:  Pt met goals request d/c     Discharge reason: Patient has met all of his/her goals    Plan   This patient is discharged from Physical Therapy.   Michael Walton, PT

## 2025-05-27 ENCOUNTER — PATIENT OUTREACH (OUTPATIENT)
Facility: HOSPITAL | Age: 70
End: 2025-05-27
Payer: COMMERCIAL

## 2025-05-27 NOTE — PROGRESS NOTES
Pt last eye exam was completed on 3/18/2024 at [facility]. Pt due to be compliant.

## 2025-07-14 ENCOUNTER — OFFICE VISIT (OUTPATIENT)
Dept: FAMILY MEDICINE | Facility: CLINIC | Age: 70
End: 2025-07-14
Payer: COMMERCIAL

## 2025-07-14 VITALS
OXYGEN SATURATION: 96 % | TEMPERATURE: 98 F | HEIGHT: 70 IN | SYSTOLIC BLOOD PRESSURE: 140 MMHG | WEIGHT: 293 LBS | BODY MASS INDEX: 41.95 KG/M2 | RESPIRATION RATE: 18 BRPM | DIASTOLIC BLOOD PRESSURE: 80 MMHG | HEART RATE: 94 BPM

## 2025-07-14 DIAGNOSIS — F33.42 RECURRENT MAJOR DEPRESSIVE DISORDER, IN FULL REMISSION: ICD-10-CM

## 2025-07-14 DIAGNOSIS — M16.11 ARTHRITIS OF RIGHT HIP: ICD-10-CM

## 2025-07-14 DIAGNOSIS — E53.8 VITAMIN B 12 DEFICIENCY: ICD-10-CM

## 2025-07-14 DIAGNOSIS — J32.0 MAXILLARY SINUSITIS, UNSPECIFIED CHRONICITY: ICD-10-CM

## 2025-07-14 DIAGNOSIS — E78.5 HYPERLIPIDEMIA, UNSPECIFIED HYPERLIPIDEMIA TYPE: ICD-10-CM

## 2025-07-14 DIAGNOSIS — E11.9 TYPE 2 DIABETES MELLITUS WITHOUT COMPLICATION, WITHOUT LONG-TERM CURRENT USE OF INSULIN: Primary | ICD-10-CM

## 2025-07-14 DIAGNOSIS — E55.9 VITAMIN D DEFICIENCY: ICD-10-CM

## 2025-07-14 DIAGNOSIS — J30.1 SEASONAL ALLERGIC RHINITIS DUE TO POLLEN: ICD-10-CM

## 2025-07-14 DIAGNOSIS — M17.0 OSTEOARTHRITIS OF BOTH KNEES, UNSPECIFIED OSTEOARTHRITIS TYPE: ICD-10-CM

## 2025-07-14 DIAGNOSIS — I10 ESSENTIAL HYPERTENSION: ICD-10-CM

## 2025-07-14 DIAGNOSIS — M85.80 OSTEOPENIA, UNSPECIFIED LOCATION: ICD-10-CM

## 2025-07-14 LAB
25(OH)D3 SERPL-MCNC: 45.8 NG/ML (ref 30–80)
ALBUMIN SERPL BCP-MCNC: 3.3 G/DL (ref 3.4–4.8)
ALBUMIN/GLOB SERPL: 0.7 {RATIO}
ALP SERPL-CCNC: 99 U/L (ref 40–150)
ALT SERPL W P-5'-P-CCNC: 14 U/L
ANION GAP SERPL CALCULATED.3IONS-SCNC: 11 MMOL/L (ref 7–16)
AST SERPL W P-5'-P-CCNC: 20 U/L (ref 11–45)
BASOPHILS # BLD AUTO: 0.06 K/UL (ref 0–0.2)
BASOPHILS NFR BLD AUTO: 0.7 % (ref 0–1)
BILIRUB SERPL-MCNC: 0.5 MG/DL
BUN SERPL-MCNC: 9 MG/DL (ref 10–20)
BUN/CREAT SERPL: 11 (ref 6–20)
CALCIUM SERPL-MCNC: 9.4 MG/DL (ref 8.4–10.2)
CHLORIDE SERPL-SCNC: 103 MMOL/L (ref 98–107)
CHOLEST SERPL-MCNC: 145 MG/DL
CHOLEST/HDLC SERPL: 2.7 {RATIO}
CO2 SERPL-SCNC: 29 MMOL/L (ref 23–31)
CREAT SERPL-MCNC: 0.81 MG/DL (ref 0.55–1.02)
DIFFERENTIAL METHOD BLD: ABNORMAL
EGFR (NO RACE VARIABLE) (RUSH/TITUS): 79 ML/MIN/1.73M2
EOSINOPHIL # BLD AUTO: 0.41 K/UL (ref 0–0.5)
EOSINOPHIL NFR BLD AUTO: 4.8 % (ref 1–4)
ERYTHROCYTE [DISTWIDTH] IN BLOOD BY AUTOMATED COUNT: 12.6 % (ref 11.5–14.5)
EST. AVERAGE GLUCOSE BLD GHB EST-MCNC: 169 MG/DL
FOLATE SERPL-MCNC: 13.1 NG/ML (ref 7–31.4)
GLOBULIN SER-MCNC: 4.7 G/DL (ref 2–4)
GLUCOSE SERPL-MCNC: 272 MG/DL (ref 82–115)
HBA1C MFR BLD HPLC: 7.5 %
HCT VFR BLD AUTO: 38.4 % (ref 38–47)
HDLC SERPL-MCNC: 54 MG/DL (ref 35–60)
HGB BLD-MCNC: 11.4 G/DL (ref 12–16)
IMM GRANULOCYTES # BLD AUTO: 0.03 K/UL (ref 0–0.04)
IMM GRANULOCYTES NFR BLD: 0.4 % (ref 0–0.4)
LDLC SERPL CALC-MCNC: 79 MG/DL
LDLC/HDLC SERPL: 1.5 {RATIO}
LYMPHOCYTES # BLD AUTO: 2.21 K/UL (ref 1–4.8)
LYMPHOCYTES NFR BLD AUTO: 25.8 % (ref 27–41)
MCH RBC QN AUTO: 29.2 PG (ref 27–31)
MCHC RBC AUTO-ENTMCNC: 29.7 G/DL (ref 32–36)
MCV RBC AUTO: 98.2 FL (ref 80–96)
MONOCYTES # BLD AUTO: 0.51 K/UL (ref 0–0.8)
MONOCYTES NFR BLD AUTO: 6 % (ref 2–6)
MPC BLD CALC-MCNC: 12 FL (ref 9.4–12.4)
NEUTROPHILS # BLD AUTO: 5.35 K/UL (ref 1.8–7.7)
NEUTROPHILS NFR BLD AUTO: 62.3 % (ref 53–65)
NONHDLC SERPL-MCNC: 91 MG/DL
NRBC # BLD AUTO: 0 X10E3/UL
NRBC, AUTO (.00): 0 %
PLATELET # BLD AUTO: 278 K/UL (ref 150–400)
POTASSIUM SERPL-SCNC: 4.9 MMOL/L (ref 3.5–5.1)
PROT SERPL-MCNC: 8 G/DL (ref 5.8–7.6)
RBC # BLD AUTO: 3.91 M/UL (ref 4.2–5.4)
SODIUM SERPL-SCNC: 138 MMOL/L (ref 136–145)
TRIGL SERPL-MCNC: 61 MG/DL (ref 37–140)
VIT B12 SERPL-MCNC: 770 PG/ML (ref 213–816)
VLDLC SERPL-MCNC: 12 MG/DL
WBC # BLD AUTO: 8.57 K/UL (ref 4.5–11)

## 2025-07-14 PROCEDURE — 82746 ASSAY OF FOLIC ACID SERUM: CPT | Mod: ,,, | Performed by: CLINICAL MEDICAL LABORATORY

## 2025-07-14 PROCEDURE — 99214 OFFICE O/P EST MOD 30 MIN: CPT | Mod: ,,, | Performed by: NURSE PRACTITIONER

## 2025-07-14 PROCEDURE — 80061 LIPID PANEL: CPT | Mod: ,,, | Performed by: CLINICAL MEDICAL LABORATORY

## 2025-07-14 PROCEDURE — 83036 HEMOGLOBIN GLYCOSYLATED A1C: CPT | Mod: ,,, | Performed by: CLINICAL MEDICAL LABORATORY

## 2025-07-14 PROCEDURE — 82306 VITAMIN D 25 HYDROXY: CPT | Mod: ,,, | Performed by: CLINICAL MEDICAL LABORATORY

## 2025-07-14 PROCEDURE — 82607 VITAMIN B-12: CPT | Mod: ,,, | Performed by: CLINICAL MEDICAL LABORATORY

## 2025-07-14 PROCEDURE — 80053 COMPREHEN METABOLIC PANEL: CPT | Mod: ,,, | Performed by: CLINICAL MEDICAL LABORATORY

## 2025-07-14 PROCEDURE — 85025 COMPLETE CBC W/AUTO DIFF WBC: CPT | Mod: ,,, | Performed by: CLINICAL MEDICAL LABORATORY

## 2025-07-14 RX ORDER — HYDROCHLOROTHIAZIDE 12.5 MG/1
12.5-25 TABLET ORAL DAILY
Qty: 180 TABLET | Refills: 1 | Status: SHIPPED | OUTPATIENT
Start: 2025-07-14 | End: 2026-01-10

## 2025-07-14 RX ORDER — SITAGLIPTIN AND METFORMIN HYDROCHLORIDE 1000; 100 MG/1; MG/1
1 TABLET, FILM COATED, EXTENDED RELEASE ORAL DAILY
Qty: 90 TABLET | Refills: 1 | Status: SHIPPED | OUTPATIENT
Start: 2025-07-14

## 2025-07-14 RX ORDER — CITALOPRAM 20 MG/1
20 TABLET ORAL DAILY
Qty: 90 TABLET | Refills: 1 | Status: SHIPPED | OUTPATIENT
Start: 2025-07-14

## 2025-07-14 RX ORDER — GLIPIZIDE 5 MG/1
TABLET, FILM COATED, EXTENDED RELEASE ORAL
Qty: 90 TABLET | Refills: 1 | Status: SHIPPED | OUTPATIENT
Start: 2025-07-14 | End: 2025-07-15

## 2025-07-14 RX ORDER — FUROSEMIDE 40 MG/1
40 TABLET ORAL DAILY
Qty: 90 TABLET | Refills: 1 | Status: SHIPPED | OUTPATIENT
Start: 2025-07-14

## 2025-07-14 RX ORDER — ATORVASTATIN CALCIUM 80 MG/1
80 TABLET, FILM COATED ORAL DAILY
COMMUNITY
End: 2025-07-14 | Stop reason: SDUPTHER

## 2025-07-14 RX ORDER — ATORVASTATIN CALCIUM 80 MG/1
80 TABLET, FILM COATED ORAL DAILY
Qty: 90 TABLET | Refills: 1 | Status: SHIPPED | OUTPATIENT
Start: 2025-07-14

## 2025-07-14 RX ORDER — ERGOCALCIFEROL 1.25 MG/1
50000 CAPSULE ORAL
Qty: 13 CAPSULE | Refills: 1 | Status: SHIPPED | OUTPATIENT
Start: 2025-07-14 | End: 2026-07-14

## 2025-07-14 RX ORDER — DICLOFENAC SODIUM 10 MG/G
2 GEL TOPICAL 2 TIMES DAILY
Qty: 100 G | Refills: 2 | Status: SHIPPED | OUTPATIENT
Start: 2025-07-14

## 2025-07-14 RX ORDER — CELECOXIB 100 MG/1
100 CAPSULE ORAL DAILY
Qty: 90 CAPSULE | Refills: 1 | Status: SHIPPED | OUTPATIENT
Start: 2025-07-14

## 2025-07-14 RX ORDER — AZITHROMYCIN 250 MG/1
TABLET, FILM COATED ORAL
Qty: 6 TABLET | Refills: 0 | Status: SHIPPED | OUTPATIENT
Start: 2025-07-14 | End: 2025-07-19

## 2025-07-14 RX ORDER — ALENDRONATE SODIUM 70 MG/1
70 TABLET ORAL
Qty: 12 TABLET | Refills: 1 | Status: SHIPPED | OUTPATIENT
Start: 2025-07-14 | End: 2026-07-14

## 2025-07-14 RX ORDER — CETIRIZINE HYDROCHLORIDE 10 MG/1
10 TABLET ORAL DAILY
Qty: 90 TABLET | Refills: 1 | Status: SHIPPED | OUTPATIENT
Start: 2025-07-14

## 2025-07-14 RX ORDER — SIMVASTATIN 40 MG/1
40 TABLET, FILM COATED ORAL NIGHTLY
COMMUNITY
Start: 2025-02-24 | End: 2025-07-14

## 2025-07-17 NOTE — PROGRESS NOTES
DELFINA Clark   [facility] [address] [place] [address]  [phone]      PATIENT NAME: Saray Jorgensen  : 1955  DATE: 25  MRN: [id number]      Billing Provider: DELFINA Clark  Level of Service:   Patient PCP Information       Provider PCP Type    DELFINA Clark General            History of Present Illness    HPI:  Patient presents for routine follow up and medication refills. She reports maxillary sinus pressure ongoing for over a month, with a slight cough associated with drainage but no expectoration. She has been taking OTC medication for sinus symptoms, which she believes may be contributing to an elevation in her blood pressure.    She has bilateral extremity edema. Compression hose have helped with the swelling and prevented further discoloration since her last visit and evaluation by a specialist in [place].    Regarding her diabetes, she denies any problems with glucose levels. Her last A1C check in April was 6.6, noted to be stable.    Her blood pressure today is 140/80, slightly more elevated than usual but still considered stable.    She denies fever.    MEDICAL HISTORY:  Patient has a history of hyperlipidemia, hypertension, diabetes, and bilateral extremity edema.    TEST RESULTS:  Patient's A1C level in April was 6.6, which is reported as stable.      ROS:  General: -fever, -chills, -fatigue, -weight gain, -weight loss  Eyes: -vision changes, -redness, -discharge  ENT: -ear pain, -nasal congestion, -sore throat  Cardiovascular: -chest pain, -palpitations, +lower extremity edema  Respiratory: +cough, -shortness of breath, +productive cough  Gastrointestinal: -abdominal pain, -nausea, -vomiting, -diarrhea, -constipation, -blood in stool  Genitourinary: -dysuria, -hematuria, -frequency  Musculoskeletal: -joint pain, -muscle pain  Skin: -rash, -lesion  Neurological: -headache, -dizziness, -numbness,  -tingling  Psychiatric: -anxiety, -depression, -sleep difficulty  Head: +sinus pressure          Medications and Allergies     Medications  Outpatient Medications Marked as Taking for the 7/14/25 encounter (Office Visit) with Kailee Garcia FNP   Medication Sig Dispense Refill    ascorbic acid-multivit-min (VITAMIN C ENERGY BOOSTER) 1,000 mg PwEP Take 1 packet by mouth once daily.      aspirin 500 MG EC tablet Take 500 mg by mouth once daily.      fluticasone propionate (FLONASE) 50 mcg/actuation nasal spray 1 spray by Each Nostril route once daily.      GARLIC ORAL Take 1 tablet by mouth once daily.      multivitamin (THERAGRAN) per tablet Take 1 tablet by mouth once daily.      olopatadine (PATADAY) 0.2 % Drop       triamcinolone acetonide 0.1% (KENALOG) 0.1 % cream Apply topically 2 (two) times daily. 80 g 1    [DISCONTINUED] alendronate (FOSAMAX) 70 MG tablet Take 1 tablet (70 mg total) by mouth every 7 days. 12 tablet 1    [DISCONTINUED] atorvastatin (LIPITOR) 80 MG tablet Take 1 tablet (80 mg total) by mouth once daily. for cholesterol 90 tablet 0    [DISCONTINUED] atorvastatin (LIPITOR) 80 MG tablet Take 80 mg by mouth once daily.      [DISCONTINUED] celecoxib (CELEBREX) 100 MG capsule Take 1 capsule (100 mg total) by mouth once daily. 90 capsule 1    [DISCONTINUED] cetirizine (ZYRTEC) 10 MG tablet Take 1 tablet (10 mg total) by mouth once daily. for allergies 90 tablet 1    [DISCONTINUED] citalopram (CELEXA) 20 MG tablet Take 1 tablet (20 mg total) by mouth once daily. 90 tablet 1    [DISCONTINUED] diclofenac sodium (VOLTAREN) 1 % Gel Apply 2 g topically 2 (two) times daily. 100 g 2    [DISCONTINUED] ergocalciferol (ERGOCALCIFEROL) 50,000 unit Cap Take 1 capsule (50,000 Units total) by mouth once every 7 days. 13 capsule 1    [DISCONTINUED] furosemide (LASIX) 40 MG tablet Take 1 tablet (40 mg total) by mouth once daily. 90 tablet 1    [DISCONTINUED] glipiZIDE 5 MG TR24 TAKE ONE TABLET BY MOUTH ONCE DAILY  WITH BREAKFAST 90 tablet 1    [DISCONTINUED] JANUMET -1,000 mg TM24 Take 1 tablet by mouth once daily. 90 tablet 1    [DISCONTINUED] simvastatin (ZOCOR) 40 MG tablet Take 40 mg by mouth every evening.         Allergies  Review of patient's allergies indicates:  No Known Allergies    Physical Exam  Constitutional:       General: She is not in acute distress.     Appearance: She is obese.   HENT:      Head: Normocephalic.      Nose: Nose normal.      Mouth/Throat:      Mouth: Mucous membranes are moist.   Eyes:      Extraocular Movements: Extraocular movements intact.   Cardiovascular:      Rate and Rhythm: Normal rate.   Pulmonary:      Effort: Pulmonary effort is normal. No respiratory distress.   Abdominal:      General: Bowel sounds are normal.      Palpations: Abdomen is soft.   Musculoskeletal:         General: Normal range of motion.      Cervical back: Normal range of motion.   Skin:     General: Skin is warm.   Neurological:      Mental Status: She is alert and oriented to person, place, and time.   Psychiatric:         Behavior: Behavior normal.          Assessment & Plan    IMPRESSION:  - Assessed maxillary sinus pressure ongoing for over a month, without fever.  - Evaluated HLD, HTN, and diabetes management.  - BP  (140/80) but stable; attributed potential elevation to OTC sinus medication.  - A1C from April (6.6) stable.  - Bilateral extremity edema improved with compression hose use.    MAXILLARY SINUSITIS:  - Patient reports maxillary sinus pressure ongoing for over a month with slight cough from drainage, denies fever.  - Advised to continue Zyrtec daily, increase fluid intake as tolerated, and rest to alleviate symptoms.  - Follow up if symptoms worsen or fail to improve.    TYPE 2 DIABETES MELLITUS:  - Patient denies glucose problems.  - A1C last checked in April was stable at 6.6.  - Counseled on following a diabetic diet at home with emphasis on low fat and low cholesterol options.  - Plan to  recheck A1c today with labs    ESSENTIAL HYPERTENSION:  - Blood pressure today is 140/80, stable but slightly more elevated than usual.  - Counseled on following a low sodium diet for management.  - Patient instructed to monitor blood pressure at home.    HYPERLIPIDEMIA:  - Educated patient on following a low cholesterol diet for management.  - Ordered fasting labs including lipid panel.    BILATERAL LOWER LIMB EDEMA:  - Patient has bilateral extremity edema.  - Wearing compression hose has helped with swelling and eliminated discoloration.    Plan to follow up in 6 months, depending upon labs.        Health Maintenance Due   Topic Date Due    COVID-19 Vaccine (1) Never done    Shingles Vaccine (1 of 2) Never done    RSV Vaccine (Age 60+ and Pregnant patients) (1 - Risk 60-74 years 1-dose series) Never done    Diabetic Eye Exam  03/18/2025    Diabetes Urine Screening  06/20/2025    Foot Exam  06/20/2025       Problem List Items Addressed This Visit          Psychiatric    Recurrent major depressive disorder, in full remission    Relevant Medications    citalopram (CELEXA) 20 MG tablet       ENT    Seasonal allergic rhinitis due to pollen    Relevant Medications    cetirizine (ZYRTEC) 10 MG tablet       Cardiac/Vascular    Essential hypertension    Relevant Medications    furosemide (LASIX) 40 MG tablet    hydroCHLOROthiazide 12.5 MG Tab    Other Relevant Orders    CBC Auto Differential (Completed)    Hyperlipidemia    Relevant Medications    atorvastatin (LIPITOR) 80 MG tablet    Other Relevant Orders    Lipid Panel (Completed)    Comprehensive Metabolic Panel (Completed)       Endocrine    Type 2 diabetes mellitus without complication, without long-term current use of insulin - Primary    Relevant Medications    JANUMET -1,000 mg TM24    Other Relevant Orders    Microalbumin/Creatinine Ratio, Urine    Hemoglobin A1C (Completed)    Vitamin B 12 deficiency    Relevant Medications    ergocalciferol  (ERGOCALCIFEROL) 50,000 unit Cap    Other Relevant Orders    Vitamin B12 & Folate (Completed)       Orthopedic    Arthritis of right hip    Relevant Medications    celecoxib (CELEBREX) 100 MG capsule    Osteoarthritis of both knees    Relevant Medications    celecoxib (CELEBREX) 100 MG capsule    diclofenac sodium (VOLTAREN) 1 % Gel     Other Visit Diagnoses         Vitamin D deficiency        Relevant Orders    Vitamin D (Completed)      Maxillary sinusitis, unspecified chronicity        Relevant Medications    azithromycin (Z-ANA) 250 MG tablet      Osteopenia, unspecified location        Relevant Medications    alendronate (FOSAMAX) 70 MG tablet            Health Maintenance Topics with due status: Not Due       Topic Last Completion Date    Colorectal Cancer Screening 12/18/2015    TETANUS VACCINE 05/30/2018    Influenza Vaccine 10/23/2024    Mammogram 11/13/2024    DEXA Scan 04/10/2025    Low Dose Statin 07/14/2025    Lipid Panel 07/14/2025    Hemoglobin A1c 07/14/2025       Future Appointments   Date Time Provider Department Center   11/19/2025  1:00 PM [facility] [place]   1/14/2026  8:40 AM [facility]   3/10/2026  2:00 PM [facility]        This note was generated with the assistance of ambient listening technology. Verbal consent was obtained by the patient and accompanying visitor(s) for the recording of patient appointment to facilitate this note. I attest to having reviewed and edited the generated note for accuracy, though some syntax or spelling errors may persist. Please contact the author of this note for any clarification.     Signature:  DELFINA Clark  [facility]  [address] [address] [address]  [phone]    Date of encounter: 7/14/25

## 2025-07-22 ENCOUNTER — TELEPHONE (OUTPATIENT)
Dept: FAMILY MEDICINE | Facility: CLINIC | Age: 70
End: 2025-07-22
Payer: COMMERCIAL

## 2025-07-31 ENCOUNTER — PATIENT MESSAGE (OUTPATIENT)
Facility: HOSPITAL | Age: 70
End: 2025-07-31
Payer: COMMERCIAL